# Patient Record
Sex: MALE | Race: WHITE | NOT HISPANIC OR LATINO | Employment: FULL TIME | URBAN - METROPOLITAN AREA
[De-identification: names, ages, dates, MRNs, and addresses within clinical notes are randomized per-mention and may not be internally consistent; named-entity substitution may affect disease eponyms.]

---

## 2017-02-24 DIAGNOSIS — E78.5 HYPERLIPIDEMIA LDL GOAL <100: ICD-10-CM

## 2017-02-24 DIAGNOSIS — I63.9 CVA (CEREBRAL VASCULAR ACCIDENT) (H): Primary | ICD-10-CM

## 2017-02-24 RX ORDER — ATORVASTATIN CALCIUM 20 MG/1
20 TABLET, FILM COATED ORAL DAILY
Qty: 90 TABLET | Refills: 0 | Status: SHIPPED | OUTPATIENT
Start: 2017-02-24 | End: 2017-03-16

## 2017-02-24 RX ORDER — CLOPIDOGREL BISULFATE 75 MG/1
75 TABLET ORAL DAILY
Qty: 90 TABLET | Refills: 0 | Status: SHIPPED | OUTPATIENT
Start: 2017-02-24 | End: 2017-03-16

## 2017-02-24 NOTE — TELEPHONE ENCOUNTER
Lipitor 20 mg     Last Written Prescription Date: 2/1/16  Last Fill Quantity: 90, # refills: 4  Last Office Visit with [facility] or  Health prescribing provider: 10/25/16       Lab Results   Component Value Date    CHOL 148 02/01/2016     Lab Results   Component Value Date    HDL 56 02/01/2016     Lab Results   Component Value Date    LDL 65 02/01/2016     Lab Results   Component Value Date    TRIG 137 02/01/2016     Lab Results   Component Value Date    CHOLHDLRATIO 3.1 01/22/2015     Plavix 75 mg tab           Last Written Prescription Date: 2/1/16  Last Fill Quantity: 90, # refills: 4    Last Office Visit with [facility] or  Health prescribing provider:  10/25/16   Future Office Visit:       Lab Results   Component Value Date    WBC 5.9 02/01/2016     Lab Results   Component Value Date    RBC 4.58 02/01/2016     Lab Results   Component Value Date    HGB 14.1 02/01/2016     Lab Results   Component Value Date    HCT 40.9 02/01/2016     No components found for: MCT  Lab Results   Component Value Date    MCV 89 02/01/2016     Lab Results   Component Value Date    MCH 30.8 02/01/2016     Lab Results   Component Value Date    MCHC 34.5 02/01/2016     Lab Results   Component Value Date    RDW 12.5 02/01/2016     Lab Results   Component Value Date     02/01/2016     Lab Results   Component Value Date    AST 24 02/01/2016     Lab Results   Component Value Date    ALT 46 02/01/2016     Creatinine   Date Value Ref Range Status   02/01/2016 0.80 0.66 - 1.25 mg/dL Final   ]

## 2017-02-24 NOTE — LETTER
[facility]  [address] [place] [address]  Phone: [phone]  Fax: [phone]        February 24, 2017      Lyle MARRUFO Jona                                                                                                                                [address] [place] [id number]            Dear Mr. Tenorio,    We are concerned about your health care.  We recently provided you with a medication refill.  Many medications require routine follow-up with your Doctor.      At this time we ask that: You schedule an appointment for your annual physical and fasting lab work.    Your prescription: Has been refilled for 1 month so you may have time for the above noted follow-up.      Thank you,      Narciso Oglesby PA-C/ ss

## 2017-03-16 ENCOUNTER — OFFICE VISIT (OUTPATIENT)
Dept: FAMILY MEDICINE | Facility: CLINIC | Age: 52
End: 2017-03-16
Payer: COMMERCIAL

## 2017-03-16 VITALS
TEMPERATURE: 96.7 F | HEIGHT: 71 IN | BODY MASS INDEX: 39.4 KG/M2 | DIASTOLIC BLOOD PRESSURE: 70 MMHG | WEIGHT: 281.4 LBS | SYSTOLIC BLOOD PRESSURE: 128 MMHG | HEART RATE: 68 BPM

## 2017-03-16 DIAGNOSIS — E78.5 HYPERLIPIDEMIA LDL GOAL <100: ICD-10-CM

## 2017-03-16 DIAGNOSIS — G47.33 OSA (OBSTRUCTIVE SLEEP APNEA): ICD-10-CM

## 2017-03-16 DIAGNOSIS — K21.9 GASTROESOPHAGEAL REFLUX DISEASE WITHOUT ESOPHAGITIS: ICD-10-CM

## 2017-03-16 DIAGNOSIS — I63.10 CEREBROVASCULAR ACCIDENT (CVA) DUE TO EMBOLISM OF PRECEREBRAL ARTERY (H): ICD-10-CM

## 2017-03-16 DIAGNOSIS — I63.429 CEREBRAL INFARCTION DUE TO EMBOLISM OF ANTERIOR CEREBRAL ARTERY, UNSPECIFIED BLOOD VESSEL LATERALITY (H): ICD-10-CM

## 2017-03-16 DIAGNOSIS — I10 HYPERTENSION GOAL BP (BLOOD PRESSURE) < 130/80: ICD-10-CM

## 2017-03-16 DIAGNOSIS — I63.10 CEREBRAL INFARCTION DUE TO EMBOLISM OF PRECEREBRAL ARTERY (H): Primary | ICD-10-CM

## 2017-03-16 LAB
ALBUMIN SERPL-MCNC: 4 G/DL (ref 3.4–5)
ALP SERPL-CCNC: 65 U/L (ref 40–150)
ALT SERPL W P-5'-P-CCNC: 41 U/L (ref 0–70)
ANION GAP SERPL CALCULATED.3IONS-SCNC: 9 MMOL/L (ref 3–14)
AST SERPL W P-5'-P-CCNC: 22 U/L (ref 0–45)
BASOPHILS # BLD AUTO: 0 10E9/L (ref 0–0.2)
BASOPHILS NFR BLD AUTO: 0.7 %
BILIRUB SERPL-MCNC: 0.6 MG/DL (ref 0.2–1.3)
BUN SERPL-MCNC: 16 MG/DL (ref 7–30)
CALCIUM SERPL-MCNC: 9 MG/DL (ref 8.5–10.1)
CHLORIDE SERPL-SCNC: 103 MMOL/L (ref 94–109)
CHOLEST SERPL-MCNC: 163 MG/DL
CO2 SERPL-SCNC: 26 MMOL/L (ref 20–32)
CREAT SERPL-MCNC: 0.72 MG/DL (ref 0.66–1.25)
DIFFERENTIAL METHOD BLD: ABNORMAL
EOSINOPHIL # BLD AUTO: 0.3 10E9/L (ref 0–0.7)
EOSINOPHIL NFR BLD AUTO: 5.7 %
ERYTHROCYTE [DISTWIDTH] IN BLOOD BY AUTOMATED COUNT: 12.8 % (ref 10–15)
GFR SERPL CREATININE-BSD FRML MDRD: ABNORMAL ML/MIN/1.7M2
GLUCOSE SERPL-MCNC: 120 MG/DL (ref 70–99)
HBA1C MFR BLD: 6 % (ref 4.3–6)
HCT VFR BLD AUTO: 39.5 % (ref 40–53)
HDLC SERPL-MCNC: 51 MG/DL
HGB BLD-MCNC: 13.4 G/DL (ref 13.3–17.7)
LDLC SERPL CALC-MCNC: 70 MG/DL
LYMPHOCYTES # BLD AUTO: 1.5 10E9/L (ref 0.8–5.3)
LYMPHOCYTES NFR BLD AUTO: 26 %
MCH RBC QN AUTO: 29.8 PG (ref 26.5–33)
MCHC RBC AUTO-ENTMCNC: 33.9 G/DL (ref 31.5–36.5)
MCV RBC AUTO: 88 FL (ref 78–100)
MONOCYTES # BLD AUTO: 0.6 10E9/L (ref 0–1.3)
MONOCYTES NFR BLD AUTO: 10.5 %
NEUTROPHILS # BLD AUTO: 3.2 10E9/L (ref 1.6–8.3)
NEUTROPHILS NFR BLD AUTO: 57.1 %
NONHDLC SERPL-MCNC: 112 MG/DL
PLATELET # BLD AUTO: 192 10E9/L (ref 150–450)
POTASSIUM SERPL-SCNC: 4 MMOL/L (ref 3.4–5.3)
PROT SERPL-MCNC: 7.3 G/DL (ref 6.8–8.8)
RBC # BLD AUTO: 4.49 10E12/L (ref 4.4–5.9)
SODIUM SERPL-SCNC: 138 MMOL/L (ref 133–144)
TRIGL SERPL-MCNC: 210 MG/DL
WBC # BLD AUTO: 5.6 10E9/L (ref 4–11)

## 2017-03-16 PROCEDURE — 80061 LIPID PANEL: CPT | Performed by: PHYSICIAN ASSISTANT

## 2017-03-16 PROCEDURE — 83036 HEMOGLOBIN GLYCOSYLATED A1C: CPT | Performed by: PHYSICIAN ASSISTANT

## 2017-03-16 PROCEDURE — 80053 COMPREHEN METABOLIC PANEL: CPT | Performed by: PHYSICIAN ASSISTANT

## 2017-03-16 PROCEDURE — 99396 PREV VISIT EST AGE 40-64: CPT | Performed by: PHYSICIAN ASSISTANT

## 2017-03-16 PROCEDURE — 36415 COLL VENOUS BLD VENIPUNCTURE: CPT | Performed by: PHYSICIAN ASSISTANT

## 2017-03-16 PROCEDURE — 85025 COMPLETE CBC W/AUTO DIFF WBC: CPT | Performed by: PHYSICIAN ASSISTANT

## 2017-03-16 RX ORDER — CLOPIDOGREL BISULFATE 75 MG/1
75 TABLET ORAL DAILY
Qty: 90 TABLET | Refills: 3 | Status: SHIPPED | OUTPATIENT
Start: 2017-03-16 | End: 2018-03-19

## 2017-03-16 RX ORDER — ATORVASTATIN CALCIUM 20 MG/1
20 TABLET, FILM COATED ORAL DAILY
Qty: 90 TABLET | Refills: 3 | Status: SHIPPED | OUTPATIENT
Start: 2017-03-16 | End: 2018-03-19

## 2017-03-16 RX ORDER — LISINOPRIL 2.5 MG/1
2.5 TABLET ORAL DAILY
Qty: 90 TABLET | Refills: 3 | Status: SHIPPED | OUTPATIENT
Start: 2017-03-16 | End: 2018-03-26

## 2017-03-16 NOTE — LETTER
[facility]  [address] [place] [address]  Phone: [phone]  Fax: [phone]    March 20, 2017    Lyle Tenorio  [address]  [place] [address]              Dear Mr. Tenorio,    The results of your recent lab tests have been reviewed. Labs look good. Glucose was slightly elevated again and this appears to be pre diabetes.  Enclosed is a copy of these results.  If you have any further questions or problems, please contact our office.        Sincerely,      Narciso Oglesby PA-C/ ac      Component      Latest Ref Rng & Units 3/16/2017   Hemoglobin A1C      4.3 - 6.0 % 6.0     Lipid Panel       Latest Ref Rng & Units 3/16/2017   Cholesterol      <200 mg/dL 163   Triglycerides      <150 mg/dL 210 (H)   HDL Cholesterol      >39 mg/dL 51   LDL Cholesterol Calculated      <100 mg/dL 70   Non HDL Cholesterol      <130 mg/dL 112                    tj        Comprehensive Metabolic Panel       Latest Ref Rng & Units 3/16/2017   Sodium      133 - 144 mmol/L 138   Potassium      3.4 - 5.3 mmol/L 4.0   Chloride      94 - 109 mmol/L 103   Carbon Dioxide      20 - 32 mmol/L 26   Anion Gap      3 - 14 mmol/L 9   Glucose      70 - 99 mg/dL 120 (H)   Urea Nitrogen      7 - 30 mg/dL 16   Creatinine      0.66 - 1.25 mg/dL 0.72   GFR Estimate      >60 mL/min/1.7m2 >90 . . .   GFR Estimate If Black      >60 mL/min/1.7m2 >90 . . .   Calcium      8.5 - 10.1 mg/dL 9.0   Bilirubin Total      0.2 - 1.3 mg/dL 0.6   Albumin      3.4 - 5.0 g/dL 4.0   Protein Total      6.8 - 8.8 g/dL 7.3   Alkaline Phosphatase      40 - 150 U/L 65   ALT      0 - 70 U/L 41   AST      0 - 45 U/L 22

## 2017-03-16 NOTE — PROGRESS NOTES
SUBJECTIVE:     CC: Lyle Tenorio is an 51 year old male who presents for preventative health visit.     Healthy Habits:    Do you get at least three servings of calcium containing foods daily (dairy, green leafy vegetables, etc.)? no, taking calcium and/or vitamin D supplement: yes - takes both     Amount of exercise or daily activities, outside of work: 0 day(s) per week    Problems taking medications regularly No    Medication side effects: No    Have you had an eye exam in the past two years? yes    Do you see a dentist twice per year? yes    Do you have sleep apnea, excessive snoring or daytime drowsiness?yes            Today's PHQ-2 Score:   PHQ-2 ( 1999 [employer]) 2/1/2016 1/4/2013   Q1: Little interest or pleasure in doing things 0 0   Q2: Feeling down, depressed or hopeless 0 0   PHQ-2 Score 0 0       Abuse: Current or Past(Physical, Sexual or Emotional)- No  Do you feel safe in your environment - Yes    Social History   Substance Use Topics     Smoking status: Former Smoker     Packs/day: 1.00     Years: 25.00     Types: Cigarettes     Quit date: 3/28/2010     Smokeless tobacco: Never Used     Alcohol use No     The patient does not drink >3 drinks per day nor >7 drinks per week.    Last PSA: No results found for: PSA    Recent Labs   Lab Test  02/01/16   1011  01/22/15   1134  10/29/13   1135   CHOL  148  170  174   HDL  56  55  64   LDL  65  65  93   TRIG  137  251*  85   CHOLHDLRATIO   --   3.1  3.0   [facility]  92   --    --        Reviewed orders with patient. Reviewed health maintenance and updated orders accordingly - Yes    Reviewed and updated as needed this visit by clinical staff  Tobacco  Allergies  Med Hx  Surg Hx  Fam Hx  Soc Hx        Reviewed and updated as needed this visit by Provider            ROS:  C: NEGATIVE for fever, chills, change in weight  I: NEGATIVE for worrisome rashes, moles or lesions  E: NEGATIVE for vision changes or irritation  ENT: NEGATIVE for ear, mouth and throat  "problems  R: NEGATIVE for significant cough or SOB  CV: NEGATIVE for chest pain, palpitations or peripheral edema  GI: NEGATIVE for nausea, abdominal pain, heartburn, or change in bowel habits   male: negative for dysuria, hematuria, decreased urinary stream, erectile dysfunction, urethral discharge  M: NEGATIVE for significant arthralgias or myalgia  N: NEGATIVE for weakness, dizziness or paresthesias  P: NEGATIVE for changes in mood or affect    Problem list, Medication list, Allergies, and Medical/Social/Surgical histories reviewed in [facility] and updated as appropriate.  OBJECTIVE:     /70 (BP Location: Right arm, Patient Position: Chair, Cuff Size: Adult Large)  Pulse 68  Temp 96.7  F (35.9  C) (Tympanic)  Ht 5' 11.25\" (1.81 m)  Wt 281 lb 6.4 oz (127.6 kg)  BMI 38.97 kg/m2  EXAM:  GENERAL: healthy, alert and no distress  EYES: Eyes grossly normal to inspection, PERRL and conjunctivae and sclerae normal  HENT: ear canals and TM's normal, nose and mouth without ulcers or lesions  NECK: no adenopathy, no asymmetry, masses, or scars and thyroid normal to palpation  RESP: lungs clear to auscultation - no rales, rhonchi or wheezes  CV: regular rate and rhythm, normal S1 S2, no S3 or S4, no murmur, click or rub, no peripheral edema and peripheral pulses strong  ABDOMEN: soft, nontender, no hepatosplenomegaly, no masses and bowel sounds normal  MS: no gross musculoskeletal defects noted, no edema  SKIN: no suspicious lesions or rashes  NEURO: Normal strength and tone, mentation intact and speech normal  PSYCH: mentation appears normal, affect normal/bright    ASSESSMENT/PLAN:         ICD-10-CM    1. Cerebral infarction due to embolism of precerebral artery (H) I63.10 Comprehensive metabolic panel (BMP + Alb, Alk Phos, ALT, AST, Total. Bili, TP)     Lipid Profile with reflex to direct LDL     CBC with platelets and differential   2. Hyperlipidemia LDL goal <100 E78.5 Comprehensive metabolic panel (BMP + Alb, Alk " "Phos, ALT, AST, Total. Bili, TP)     Lipid Profile with reflex to direct LDL     CBC with platelets and differential     atorvastatin (LIPITOR) 20 MG tablet   3. Hypertension goal BP (blood pressure) < 130/80 I10 Comprehensive metabolic panel (BMP + Alb, Alk Phos, ALT, AST, Total. Bili, TP)     Lipid Profile with reflex to direct LDL     CBC with platelets and differential   4. Cerebrovascular accident (CVA) due to embolism of precerebral artery (H) I63.10 Comprehensive metabolic panel (BMP + Alb, Alk Phos, ALT, AST, Total. Bili, TP)     Lipid Profile with reflex to direct LDL     CBC with platelets and differential     clopidogrel (PLAVIX) 75 MG tablet   5. Cerebral infarction due to embolism of anterior cerebral artery, unspecified blood vessel laterality (H) I63.429 lisinopril (PRINIVIL/ZESTRIL) 2.5 MG tablet   6. Gastroesophageal reflux disease without esophagitis K21.9 LANsoprazole (PREVACID SOLUTAB) 30 MG ODT tab   7. MADDY (obstructive sleep apnea) G47.33 order for DME       COUNSELING:  Reviewed preventive health counseling, as reflected in patient instructions       Regular exercise       Healthy diet/nutrition       Vision screening         reports that he quit smoking about 6 years ago. His smoking use included Cigarettes. He has a 25.00 pack-year smoking history. He has never used smokeless tobacco.    Estimated body mass index is 38.97 kg/(m^2) as calculated from the following:    Height as of this encounter: 5' 11.25\" (1.81 m).    Weight as of this encounter: 281 lb 6.4 oz (127.6 kg).   Weight management plan: Discussed healthy diet and exercise guidelines and patient will follow up in 12 months in clinic to re-evaluate.    Counseling Resources:  ATP IV Guidelines  Pooled Cohorts Equation Calculator  FRAX Risk Assessment  ICSI Preventive Guidelines  Dietary Guidelines for Americans, 2010  USDA's MyPlate  ASA Prophylaxis  Lung CA Screening    Abbie Epley, PA-S  LUCAS GuerreroC  [facility] " [facility]

## 2017-03-16 NOTE — MR AVS SNAPSHOT
After Visit Summary   3/16/2017    Lyle Tenorio    MRN: [id number]           Patient Information     Date Of Birth          1965        Visit Information        Provider Department      3/16/2017 8:00 AM Narciso Oglesby PA-C [facility]        Today's Diagnoses     Cerebral infarction due to embolism of precerebral artery (H)    -  1    Hyperlipidemia LDL goal <100        Hypertension goal BP (blood pressure) < 130/80        Cerebrovascular accident (CVA) due to embolism of precerebral artery (H)        Cerebral infarction due to embolism of anterior cerebral artery, unspecified blood vessel laterality (H)        Gastroesophageal reflux disease without esophagitis        MADDY (obstructive sleep apnea)          Care Instructions      Preventive Health Recommendations  Male Ages 40 to 49    Yearly exam:             See your health care provider every year in order to  o   Review health changes.   o   Discuss preventive care.    o   Review your medicines if your doctor has prescribed any.    You should be tested each year for STDs (sexually transmitted diseases) if you re at risk.     Have a cholesterol test every 5 years.     Have a colonoscopy (test for colon cancer) if someone in your family has had colon cancer or polyps before age 50.     After age 45, have a diabetes test (fasting glucose). If you are at risk for diabetes, you should have this test every 3 years.      Talk with your health care provider about whether or not a prostate cancer screening test (PSA) is right for you.    Shots: Get a flu shot each year. Get a tetanus shot every 10 years.     Nutrition:    Eat at least 5 servings of fruits and vegetables daily.     Eat whole-grain bread, whole-wheat pasta and brown rice instead of white grains and rice.     Talk to your provider about Calcium and Vitamin D.     Lifestyle    Exercise for at least 150 minutes a week (30 minutes a day, 5 days a week). This will help  "you control your weight and prevent disease.     Limit alcohol to one drink per day.     No smoking.     Wear sunscreen to prevent skin cancer.     See your dentist every six months for an exam and cleaning.            Follow-ups after your visit        Who to contact     If you have questions or need follow up information about today's clinic visit or your schedule please contact [facility] directly at [phone].  Normal or non-critical lab and imaging results will be communicated to you by [employer], letter or phone within 4 business days after the clinic has received the results. If you do not hear from us within 7 days, please contact the clinic through [employer] or phone. If you have a critical or abnormal lab result, we will notify you by phone as soon as possible.  Submit refill requests through [employer] or call your pharmacy and they will forward the refill request to us. Please allow 3 business days for your refill to be completed.          Additional Information About Your Visit        [employer] Information     [employer] gives you secure access to your electronic health record. If you see a primary care provider, you can also send messages to your care team and make appointments. If you have questions, please call your primary care clinic.  If you do not have a primary care provider, please call [phone] and they will assist you.        Care EveryWhere ID     This is your Care EveryWhere ID. This could be used by other organizations to access your [facility] medical records  [id number]        Your Vitals Were     Pulse Temperature Height BMI (Body Mass Index)          68 96.7  F (35.9  C) (Tympanic) 5' 11.25\" (1.81 m) 38.97 kg/m2         Blood Pressure from Last 3 Encounters:   03/16/17 128/70   10/25/16 129/85   02/01/16 128/84    Weight from Last 3 Encounters:   03/16/17 281 lb 6.4 oz (127.6 kg)   10/25/16 277 lb (125.6 kg)   02/01/16 274 lb 6.4 oz (124.5 kg)              We Performed " the Following     CBC with platelets and differential     Comprehensive metabolic panel (BMP + Alb, Alk Phos, ALT, AST, Total. Bili, TP)     Lipid Profile with reflex to direct LDL          Today's Medication Changes          These changes are accurate as of: 3/16/17  8:17 AM.  If you have any questions, ask your nurse or doctor.               These medicines have changed or have updated prescriptions.        Dose/Directions    atorvastatin 20 MG tablet   Commonly known as:  LIPITOR   This may have changed:  additional instructions   Used for:  Hyperlipidemia LDL goal <100   Changed by:  Narciso Oglesby PA-C        Dose:  20 mg   Take 1 tablet (20 mg) by mouth daily   Quantity:  90 tablet   Refills:  3       clopidogrel 75 MG tablet   Commonly known as:  PLAVIX   This may have changed:  additional instructions   Used for:  Cerebrovascular accident (CVA) due to embolism of precerebral artery (H)   Changed by:  Narciso Oglesby PA-C        Dose:  75 mg   Take 1 tablet (75 mg) by mouth daily   Quantity:  90 tablet   Refills:  3       vitamin D 2000 UNITS tablet   This may have changed:  how much to take   Used for:  Hypertension goal BP (blood pressure) < 130/80        Dose:  2000 Units   Take 2,000 Units by mouth daily.   Quantity:  100 tablet   Refills:  3            Where to get your medicines      These medications were sent to [facility] [address] [place] [address] - [address]   [address] [facility] [address]     Phone:  [phone]     atorvastatin 20 MG tablet    clopidogrel 75 MG tablet    LANsoprazole 30 MG ODT tab    lisinopril 2.5 MG tablet         Some of these will need a paper prescription and others can be bought over the counter.  Ask your nurse if you have questions.     Bring a paper prescription for each of these medications     order for DME                Primary Care Provider Office Phone # Fax #    Narciso Oglesby PA-C [phone]       [facility] [address]  [address] [address]        Thank you!     Thank you for choosing [facility]  for your care. Our goal is always to provide you with excellent care. Hearing back from our patients is one way we can continue to improve our services. Please take a few minutes to complete the written survey that you may receive in the mail after your visit with us. Thank you!             Your Updated Medication List - Protect others around you: Learn how to safely use, store and throw away your medicines at www.disposemymeds.org.          This list is accurate as of: 3/16/17  8:17 AM.  Always use your most recent med list.                   Brand Name Dispense Instructions for use    * ASPIRIN NOT PRESCRIBED    INTENTIONAL     Antiplatelet medication not prescribed intentionally due to Plavix       atorvastatin 20 MG tablet    LIPITOR    90 tablet    Take 1 tablet (20 mg) by mouth daily       calcium carbonate 500 MG chewable tablet    TUMS    180 chew tab    Take 1 tablet by mouth 2 times daily.       clopidogrel 75 MG tablet    PLAVIX    90 tablet    Take 1 tablet (75 mg) by mouth daily       cyanocobalamin 1000 MCG Tbcr    VITAMIN B-12 ER    100 tablet    Take 1,000 mcg by mouth daily.       IBUPROFEN PO      Take 400-600 mg by mouth as needed.       LANsoprazole 30 MG ODT tab    PREVACID SOLUTAB    30 tablet    Take 1 tablet (30 mg) by mouth as needed       lisinopril 2.5 MG tablet    PRINIVIL/Zestril    90 tablet    Take 1 tablet (2.5 mg) by mouth daily       * order for DME      Pt was set up on a [employer]ics RemStar 60 Series Auto AFlex 5-15 cm H2O with heated humidity and a modem. Pt chose a Mirage FX nasal mask standard size.       order for DME     1 Device     HEATED HUMIDITY  CHIN STRAP  NON-DSPOSABLE FILTERS  DISPOSABLE FILTERS (2 PER 1 MONTHS)  NASAL PILLOW (2 PER 1 MONTHS)  FULL FACE MASK  (1 PER 3 MONTHS)  FULL FACE CUSHION  (1 PER 1 MONTHS)   HUMIDIFIER CHAMBER (1 PER 6 MONTHS)  MASK (1 PER 3 MONTHS)  NASAL CUSHION (2 PER 1 MONTHS)  HEADGEAR (1 PER 6 MONTHS)  TUBING (1 PER 3 MONTHS)  HEATED TUBING (1 PER 3 MONTHS)       VITAMIN C PO      Take 1,000 mg by mouth       vitamin D 2000 UNITS tablet     100 tablet    Take 2,000 Units by mouth daily.       * Notice:  This list has 2 medication(s) that are the same as other medications prescribed for you. Read the directions carefully, and ask your doctor or other care provider to review them with you.

## 2017-03-16 NOTE — NURSING NOTE
"Chief Complaint   Patient presents with     Physical       Initial /70 (BP Location: Right arm, Patient Position: Chair, Cuff Size: Adult Large)  Pulse 68  Temp 96.7  F (35.9  C) (Tympanic)  Ht 5' 11.25\" (1.81 m)  Wt 281 lb 6.4 oz (127.6 kg)  BMI 38.97 kg/m2 Estimated body mass index is 38.97 kg/(m^2) as calculated from the following:    Height as of this encounter: 5' 11.25\" (1.81 m).    Weight as of this encounter: 281 lb 6.4 oz (127.6 kg).  Medication Reconciliation: complete    Health Maintenance that is potentially due pending provider review:  NONE    n/a    Brandee MANN CMA    "

## 2017-05-19 DIAGNOSIS — G47.33 OSA (OBSTRUCTIVE SLEEP APNEA): Primary | ICD-10-CM

## 2018-01-22 ENCOUNTER — OFFICE VISIT (OUTPATIENT)
Dept: SLEEP MEDICINE | Facility: CLINIC | Age: 53
End: 2018-01-22
Payer: COMMERCIAL

## 2018-01-22 VITALS
BODY MASS INDEX: 39.76 KG/M2 | DIASTOLIC BLOOD PRESSURE: 84 MMHG | SYSTOLIC BLOOD PRESSURE: 142 MMHG | WEIGHT: 284 LBS | OXYGEN SATURATION: 97 % | HEIGHT: 71 IN | HEART RATE: 78 BPM

## 2018-01-22 DIAGNOSIS — G47.33 OSA (OBSTRUCTIVE SLEEP APNEA): Primary | ICD-10-CM

## 2018-01-22 PROCEDURE — 99214 OFFICE O/P EST MOD 30 MIN: CPT | Performed by: FAMILY MEDICINE

## 2018-01-22 NOTE — PROGRESS NOTES
"  Obstructive Sleep Apnea - PAP Follow-Up Visit:    Chief Complaint   Patient presents with     CPAP Follow Up     Annual CPAP follow up       Lyle NIRU Tenorio comes in today for annual follow-up of moderate MADDY (12/12/2012 with AHI 22.7, anisha desat 86%) treated with CPAP 11 cm H2O. Presenting concerns of reducing risk factors for recurrent stroke.  He feels the CPAP continues to work well, sleeps well. Uses it every night. Discussed oral appliances for use on infrequent travel.  CPAP download from 12/23/2017 - 1/21/2018 on set pressure 11 cm H2O with average daily usage of 7 hours 49 minutes, % of days used >= 4 hours of 93.3%. AHI 1.3.     Problem List:  Patient Active Problem List    Diagnosis Date Noted     Generalized anxiety disorder 03/06/2013     Priority: Medium     Diagnosis updated by automated process. Provider to review and confirm.       Major depression in complete remission (H) 03/04/2013     Priority: Medium     MADDY (obstructive sleep apnea) 01/22/2013     Priority: Medium     Moderate MADDY (12/12/2012 with AHI 22.7, anisha desat 86%)       CVA (cerebral vascular accident) (H) 11/08/2012     Priority: Medium     Hypertension goal BP (blood pressure) < 130/80 06/08/2012     Priority: Medium     Cerebral embolism with cerebral infarction (H) 03/29/2012     Priority: Medium     Hyperlipidemia LDL goal <100 03/29/2012     Priority: Medium     Paresthesia 03/29/2012     Priority: Medium     Advanced care planning/counseling discussion 03/28/2012     Priority: Low     Worksheet given and will bring copy in when complete, will call if has questions                /84  Pulse 78  Ht 1.81 m (5' 11.25\")  Wt 128.8 kg (284 lb)  SpO2 97%  BMI 39.33 kg/m2    Impression/Plan:    1.) Moderate MADDY (12/12/2012 with AHI 22.7, anisha desat 86%) currently treated with CPAP 11 cm H2O  - Goals for treatment would be to reduce risk for recurrent stroke and to improve sleep quality / daytime fatigue   - Excellent " "response to CPAP therapy and patient feels it is \"awesome\" and MADDY appears well controlled per download  - Follow up in two years.       Lyle Tenorio will follow up in about 2 year(s).     Twenty-five minutes spent with patient, all of which were spent face-to-face counseling, consulting, coordinating plan of care.      Arnold Phillips MD, MD    CC:  Narciso Oglesby  "

## 2018-01-22 NOTE — MR AVS SNAPSHOT
After Visit Summary   1/22/2018    Lyle Tenorio    MRN: [id number]           Patient Information     Date Of Birth          1965        Visit Information        Provider Department      1/22/2018 9:30 AM Arnold Phillips MD [facility]        Today's Diagnoses     MADDY (obstructive sleep apnea)    -  1      Care Instructions      Your BMI is Body mass index is 39.33 kg/(m^2).  Weight management is a personal decision.  If you are interested in exploring weight loss strategies, the following discussion covers the approaches that may be successful. Body mass index (BMI) is one way to tell whether you are at a healthy weight, overweight, or obese. It measures your weight in relation to your height.  A BMI of 18.5 to 24.9 is in the healthy range. A person with a BMI of 25 to 29.9 is considered overweight, and someone with a BMI of 30 or greater is considered obese. More than two-thirds of American adults are considered overweight or obese.  Being overweight or obese increases the risk for further weight gain. Excess weight may lead to heart disease and diabetes.  Creating and following plans for healthy eating and physical activity may help you improve your health.  Weight control is part of healthy lifestyle and includes exercise, emotional health, and healthy eating habits. Careful eating habits lifelong are the mainstay of weight control. Though there are significant health benefits from weight loss, long-term weight loss with diet alone may be very difficult to achieve- studies show long-term success with dietary management in less than 10% of people. Attaining a healthy weight may be especially difficult to achieve in those with severe obesity. In some cases, medications, devices and surgical management might be considered.  What can you do?  If you are overweight or obese and are interested in methods for weight loss, you should discuss this with your provider.      Consider reducing daily calorie intake by 500 calories.     Keep a food journal.     Avoiding skipping meals, consider cutting portions instead.    Diet combined with exercise helps maintain muscle while optimizing fat loss. Strength training is particularly important for building and maintaining muscle mass. Exercise helps reduce stress, increase energy, and improves fitness. Increasing exercise without diet control, however, may not burn enough calories to loose weight.       Start walking three days a week 10-20 minutes at a time    Work towards walking thirty minutes five days a week     Eventually, increase the speed of your walking for 1-2 minutes at time    In addition, we recommend that you review healthy lifestyles and methods for weight loss available through the National Institutes of Health patient information sites:  http://win.niddk.nih.gov/publications/index.htm    And look into health and wellness programs that may be available through your health insurance provider, employer, local community center, or adriana club.    Weight management plan: Patient was referred to their PCP to discuss a diet and exercise plan.      Your Body mass index is 39.33 kg/(m^2).  Weight management is a personal decision.  If you are interested in exploring weight loss strategies, the following discussion covers the approaches that may be successful. Body mass index (BMI) is one way to tell whether you are at a healthy weight, overweight, or obese. It measures your weight in relation to your height.  A BMI of 18.5 to 24.9 is in the healthy range. A person with a BMI of 25 to 29.9 is considered overweight, and someone with a BMI of 30 or greater is considered obese. More than two-thirds of American adults are considered overweight or obese.  Being overweight or obese increases the risk for further weight gain. Excess weight may lead to heart disease and diabetes.  Creating and following plans for healthy eating and  physical activity may help you improve your health.  Weight control is part of healthy lifestyle and includes exercise, emotional health, and healthy eating habits. Careful eating habits lifelong are the mainstay of weight control. Though there are significant health benefits from weight loss, long-term weight loss with diet alone may be very difficult to achieve- studies show long-term success with dietary management in less than 10% of people. Attaining a healthy weight may be especially difficult to achieve in those with severe obesity. In some cases, medications, devices and surgical management might be considered.  What can you do?  If you are overweight or obese and are interested in methods for weight loss, you should discuss this with your provider.     Consider reducing daily calorie intake by 500 calories.     Keep a food journal.     Avoiding skipping meals, consider cutting portions instead.    Diet combined with exercise helps maintain muscle while optimizing fat loss. Strength training is particularly important for building and maintaining muscle mass. Exercise helps reduce stress, increase energy, and improves fitness. Increasing exercise without diet control, however, may not burn enough calories to loose weight.       Start walking three days a week 10-20 minutes at a time    Work towards walking thirty minutes five days a week     Eventually, increase the speed of your walking for 1-2 minutes at time    In addition, we recommend that you review healthy lifestyles and methods for weight loss available through the National Institutes of Health patient information sites:  http://win.niddk.nih.gov/publications/index.htm    And look into health and wellness programs that may be available through your health insurance provider, employer, local community center, or adriana club.    Weight management plan: Patient was referred to their PCP to discuss a diet and exercise plan.            Follow-ups after your  "visit        Follow-up notes from your care team     Return in 2 years (on 1/22/2020) for PAP follow up.      Who to contact     If you have questions or need follow up information about today's clinic visit or your schedule please contact [facility] directly at [phone].  Normal or non-critical lab and imaging results will be communicated to you by MyChart, letter or phone within 4 business days after the clinic has received the results. If you do not hear from us within 7 days, please contact the clinic through [employer] or phone. If you have a critical or abnormal lab result, we will notify you by phone as soon as possible.  Submit refill requests through [employer] or call your pharmacy and they will forward the refill request to us. Please allow 3 business days for your refill to be completed.          Additional Information About Your Visit        MyChart Information     [employer] gives you secure access to your electronic health record. If you see a primary care provider, you can also send messages to your care team and make appointments. If you have questions, please call your primary care clinic.  If you do not have a primary care provider, please call [phone] and they will assist you.        Care EveryWhere ID     This is your Care EveryWhere ID. This could be used by other organizations to access your [place] medical records  [id number]        Your Vitals Were     Pulse Height Pulse Oximetry BMI (Body Mass Index)          78 1.81 m (5' 11.25\") 97% 39.33 kg/m2         Blood Pressure from Last 3 Encounters:   01/22/18 142/84   03/16/17 128/70   10/25/16 129/85    Weight from Last 3 Encounters:   01/22/18 128.8 kg (284 lb)   03/16/17 127.6 kg (281 lb 6.4 oz)   10/25/16 125.6 kg (277 lb)              We Performed the Following     Sleep Comprehensive DME          Today's Medication Changes          These changes are accurate as of: 1/22/18 11:25 AM.  If you have any questions, ask your " nurse or doctor.               These medicines have changed or have updated prescriptions.        Dose/Directions    vitamin D 2000 UNITS tablet   This may have changed:  how much to take   Used for:  Hypertension goal BP (blood pressure) < 130/80        Dose:  2000 Units   Take 2,000 Units by mouth daily.   Quantity:  100 tablet   Refills:  3                Primary Care Provider Office Phone # Fax #    Narciso Oglesby PA-C [phone] [address] [place] [address]        Equal Access to Services     JAMARI HASTINGS : Hadii aad ku hadasho Soomaali, waaxda luqadaha, qaybta kaalmada adeegyada, waxay idiin hayaan adeeg kharakate lahaydee . So [facility] [phone].    ATENCIÓN: Si habla español, tiene a villalobos disposición servicios gratuitos de asistencia lingüística. [facility] [phone].    We comply with applicable federal civil rights laws and [place] laws. We do not discriminate on the basis of race, color, national origin, age, disability, sex, sexual orientation, or gender identity.            Thank you!     Thank you for choosing [facility]  for your care. Our goal is always to provide you with excellent care. Hearing back from our patients is one way we can continue to improve our services. Please take a few minutes to complete the written survey that you may receive in the mail after your visit with us. Thank you!             Your Updated Medication List - Protect others around you: Learn how to safely use, store and throw away your medicines at www.disposemymeds.org.          This list is accurate as of: 1/22/18 11:25 AM.  Always use your most recent med list.                   Brand Name Dispense Instructions for use Diagnosis    * ASPIRIN NOT PRESCRIBED    INTENTIONAL     Antiplatelet medication not prescribed intentionally due to Plavix    Cerebral embolism with cerebral infarction (H)       atorvastatin 20 MG tablet    LIPITOR    90 tablet    Take 1 tablet (20 mg) by  mouth daily    Hyperlipidemia LDL goal <100       calcium carbonate 500 MG chewable tablet    TUMS    180 chew tab    Take 1 tablet by mouth 2 times daily.    Lactose intolerance       clopidogrel 75 MG tablet    PLAVIX    90 tablet    Take 1 tablet (75 mg) by mouth daily    Cerebrovascular accident (CVA) due to embolism of precerebral artery (H)       cyanocobalamin 1000 MCG Tbcr    VITAMIN B-12 ER    100 tablet    Take 1,000 mcg by mouth daily.    Vitamin B12 deficiency disease       IBUPROFEN PO      Take 400-600 mg by mouth as needed.        LANsoprazole 30 MG ODT tab    PREVACID SOLUTAB    30 tablet    Take 1 tablet (30 mg) by mouth as needed    Gastroesophageal reflux disease without esophagitis       lisinopril 2.5 MG tablet    PRINIVIL/Zestril    90 tablet    Take 1 tablet (2.5 mg) by mouth daily    Cerebral infarction due to embolism of anterior cerebral artery, unspecified blood vessel laterality (H)       * order for DME      Pt was set up on a [employer] RemStar 60 Series Auto AFlex 5-15 cm H2O with heated humidity and a modem. Pt chose a Mirage FX nasal mask standard size.        order for DME     1 Device     HEATED HUMIDITY  CHIN STRAP  NON-DSPOSABLE FILTERS  DISPOSABLE FILTERS (2 PER 1 MONTHS)  NASAL PILLOW (2 PER 1 MONTHS)  FULL FACE MASK  (1 PER 3 MONTHS)  FULL FACE CUSHION  (1 PER 1 MONTHS)  HUMIDIFIER CHAMBER (1 PER 6 MONTHS)  MASK (1 PER 3 MONTHS)  NASAL CUSHION (2 PER 1 MONTHS)  HEADGEAR (1 PER 6 MONTHS)  TUBING (1 PER 3 MONTHS)  HEATED TUBING (1 PER 3 MONTHS)    MADDY (obstructive sleep apnea)       VITAMIN C PO      Take 1,000 mg by mouth        vitamin D 2000 UNITS tablet     100 tablet    Take 2,000 Units by mouth daily.    Hypertension goal BP (blood pressure) < 130/80       * Notice:  This list has 2 medication(s) that are the same as other medications prescribed for you. Read the directions carefully, and ask your doctor or  other care provider to review them with you.

## 2018-01-22 NOTE — NURSING NOTE

## 2018-01-22 NOTE — PATIENT INSTRUCTIONS

## 2018-03-19 DIAGNOSIS — E78.5 HYPERLIPIDEMIA LDL GOAL <100: ICD-10-CM

## 2018-03-19 DIAGNOSIS — I63.10 CEREBROVASCULAR ACCIDENT (CVA) DUE TO EMBOLISM OF PRECEREBRAL ARTERY (H): ICD-10-CM

## 2018-03-19 RX ORDER — CLOPIDOGREL BISULFATE 75 MG/1
TABLET ORAL
Qty: 30 TABLET | Refills: 0 | Status: SHIPPED | OUTPATIENT
Start: 2018-03-19 | End: 2018-03-26

## 2018-03-19 RX ORDER — ATORVASTATIN CALCIUM 20 MG/1
TABLET, FILM COATED ORAL
Qty: 30 TABLET | Refills: 0 | Status: SHIPPED | OUTPATIENT
Start: 2018-03-19 | End: 2018-03-26

## 2018-03-26 ENCOUNTER — OFFICE VISIT (OUTPATIENT)
Dept: FAMILY MEDICINE | Facility: CLINIC | Age: 53
End: 2018-03-26
Payer: COMMERCIAL

## 2018-03-26 VITALS
RESPIRATION RATE: 20 BRPM | SYSTOLIC BLOOD PRESSURE: 130 MMHG | WEIGHT: 285 LBS | DIASTOLIC BLOOD PRESSURE: 78 MMHG | TEMPERATURE: 97.8 F | BODY MASS INDEX: 39.47 KG/M2 | HEART RATE: 76 BPM

## 2018-03-26 DIAGNOSIS — K21.9 GASTROESOPHAGEAL REFLUX DISEASE WITHOUT ESOPHAGITIS: ICD-10-CM

## 2018-03-26 DIAGNOSIS — I63.10 CEREBRAL INFARCTION DUE TO EMBOLISM OF PRECEREBRAL ARTERY (H): ICD-10-CM

## 2018-03-26 DIAGNOSIS — Z12.5 SCREENING FOR PROSTATE CANCER: ICD-10-CM

## 2018-03-26 DIAGNOSIS — E78.5 HYPERLIPIDEMIA LDL GOAL <100: Primary | ICD-10-CM

## 2018-03-26 DIAGNOSIS — I63.429 CEREBRAL INFARCTION DUE TO EMBOLISM OF ANTERIOR CEREBRAL ARTERY, UNSPECIFIED BLOOD VESSEL LATERALITY (H): ICD-10-CM

## 2018-03-26 DIAGNOSIS — F32.5 MAJOR DEPRESSION IN COMPLETE REMISSION (H): ICD-10-CM

## 2018-03-26 DIAGNOSIS — I10 HYPERTENSION GOAL BP (BLOOD PRESSURE) < 130/80: ICD-10-CM

## 2018-03-26 DIAGNOSIS — I63.10 CEREBROVASCULAR ACCIDENT (CVA) DUE TO EMBOLISM OF PRECEREBRAL ARTERY (H): ICD-10-CM

## 2018-03-26 DIAGNOSIS — Z12.11 SPECIAL SCREENING FOR MALIGNANT NEOPLASMS, COLON: ICD-10-CM

## 2018-03-26 LAB
ALBUMIN SERPL-MCNC: 4.1 G/DL (ref 3.4–5)
ALP SERPL-CCNC: 62 U/L (ref 40–150)
ALT SERPL W P-5'-P-CCNC: 33 U/L (ref 0–70)
ANION GAP SERPL CALCULATED.3IONS-SCNC: 7 MMOL/L (ref 3–14)
AST SERPL W P-5'-P-CCNC: 19 U/L (ref 0–45)
BILIRUB SERPL-MCNC: 0.6 MG/DL (ref 0.2–1.3)
BUN SERPL-MCNC: 19 MG/DL (ref 7–30)
CALCIUM SERPL-MCNC: 8.6 MG/DL (ref 8.5–10.1)
CHLORIDE SERPL-SCNC: 106 MMOL/L (ref 94–109)
CHOLEST SERPL-MCNC: 154 MG/DL
CO2 SERPL-SCNC: 27 MMOL/L (ref 20–32)
CREAT SERPL-MCNC: 0.79 MG/DL (ref 0.66–1.25)
GFR SERPL CREATININE-BSD FRML MDRD: >90 ML/MIN/1.7M2
GLUCOSE SERPL-MCNC: 142 MG/DL (ref 70–99)
HBA1C MFR BLD: 6.1 % (ref 4.3–6)
HDLC SERPL-MCNC: 53 MG/DL
LDLC SERPL CALC-MCNC: 52 MG/DL
NONHDLC SERPL-MCNC: 101 MG/DL
POTASSIUM SERPL-SCNC: 4 MMOL/L (ref 3.4–5.3)
PROT SERPL-MCNC: 7.5 G/DL (ref 6.8–8.8)
PSA SERPL-ACNC: 0.14 UG/L (ref 0–4)
SODIUM SERPL-SCNC: 140 MMOL/L (ref 133–144)
TRIGL SERPL-MCNC: 246 MG/DL

## 2018-03-26 PROCEDURE — 99214 OFFICE O/P EST MOD 30 MIN: CPT | Performed by: PHYSICIAN ASSISTANT

## 2018-03-26 PROCEDURE — 80053 COMPREHEN METABOLIC PANEL: CPT | Performed by: PHYSICIAN ASSISTANT

## 2018-03-26 PROCEDURE — 83036 HEMOGLOBIN GLYCOSYLATED A1C: CPT | Performed by: PHYSICIAN ASSISTANT

## 2018-03-26 PROCEDURE — G0103 PSA SCREENING: HCPCS | Performed by: PHYSICIAN ASSISTANT

## 2018-03-26 PROCEDURE — 80061 LIPID PANEL: CPT | Performed by: PHYSICIAN ASSISTANT

## 2018-03-26 PROCEDURE — 36415 COLL VENOUS BLD VENIPUNCTURE: CPT | Performed by: PHYSICIAN ASSISTANT

## 2018-03-26 RX ORDER — LISINOPRIL 2.5 MG/1
2.5 TABLET ORAL DAILY
Qty: 90 TABLET | Refills: 3 | Status: SHIPPED | OUTPATIENT
Start: 2018-03-26 | End: 2019-06-07

## 2018-03-26 RX ORDER — ATORVASTATIN CALCIUM 20 MG/1
20 TABLET, FILM COATED ORAL DAILY
Qty: 90 TABLET | Refills: 3 | Status: SHIPPED | OUTPATIENT
Start: 2018-03-26 | End: 2019-05-07

## 2018-03-26 RX ORDER — CLOPIDOGREL BISULFATE 75 MG/1
75 TABLET ORAL DAILY
Qty: 90 TABLET | Refills: 3 | Status: SHIPPED | OUTPATIENT
Start: 2018-03-26 | End: 2019-04-12

## 2018-03-26 ASSESSMENT — ENCOUNTER SYMPTOMS
DEPRESSION: 0
DIZZINESS: 0
HEADACHES: 0
COUGH: 0
FREQUENCY: 0
SORE THROAT: 0
EYE PAIN: 0
NAUSEA: 0
MYALGIAS: 0
DIARRHEA: 0
TINGLING: 0
ORTHOPNEA: 0
SEIZURES: 0
CONSTIPATION: 0
ABDOMINAL PAIN: 0
PHOTOPHOBIA: 0
VOMITING: 0
NECK PAIN: 0
FEVER: 0
WEAKNESS: 0
BACK PAIN: 0
HEMOPTYSIS: 0
DOUBLE VISION: 0
PALPITATIONS: 0
EYE REDNESS: 0
WHEEZING: 0
DIAPHORESIS: 0
HEARTBURN: 0
WEIGHT LOSS: 0
SENSORY CHANGE: 0
DYSURIA: 0
SHORTNESS OF BREATH: 0
SPUTUM PRODUCTION: 0
INSOMNIA: 0
LOSS OF CONSCIOUSNESS: 0
BLURRED VISION: 0
NEUROLOGICAL NEGATIVE: 1
NERVOUS/ANXIOUS: 0
BLOOD IN STOOL: 0
FOCAL WEAKNESS: 0
HALLUCINATIONS: 0
EYE DISCHARGE: 0

## 2018-03-26 ASSESSMENT — LIFESTYLE VARIABLES: SUBSTANCE_ABUSE: 0

## 2018-03-26 ASSESSMENT — PAIN SCALES - GENERAL: PAINLEVEL: NO PAIN (0)

## 2018-03-26 NOTE — NURSING NOTE
"Chief Complaint   Patient presents with     Lipids     Hypertension       Initial /78 (BP Location: Right arm, Patient Position: Sitting, Cuff Size: Adult Large)  Pulse 76  Temp 97.8  F (36.6  C) (Tympanic)  Resp 20  Wt 285 lb (129.3 kg)  BMI 39.47 kg/m2 Estimated body mass index is 39.47 kg/(m^2) as calculated from the following:    Height as of 1/22/18: 5' 11.25\" (1.81 m).    Weight as of this encounter: 285 lb (129.3 kg).      Health Maintenance that is potentially due pending provider review:  NONE    n/a    Is there anyone who you would like to be able to receive your results? No  If yes have patient fill out MIRNA      "

## 2018-03-26 NOTE — MR AVS SNAPSHOT
After Visit Summary   3/26/2018    Lyle Tenorio    MRN: [id number]           Patient Information     Date Of Birth          1965        Visit Information        Provider Department      3/26/2018 8:00 AM Narciso Oglesby PA-C [facility]        Today's Diagnoses     Hyperlipidemia LDL goal <100    -  1    Cerebrovascular accident (CVA) due to embolism of precerebral artery (H)        Special screening for malignant neoplasms, colon        Screening for prostate cancer        Hypertension goal BP (blood pressure) < 130/80        Cerebral infarction due to embolism of precerebral artery (H)        Major depression in complete remission (H)        Cerebral infarction due to embolism of anterior cerebral artery, unspecified blood vessel laterality (H)        Gastroesophageal reflux disease without esophagitis           Follow-ups after your visit        Additional Services     GASTROENTEROLOGY ADULT REF PROCEDURE ONLY       Last Lab Result: Creatinine (mg/dL)       Date                     Value                 03/16/2017               0.72             ----------  There is no height or weight on file to calculate BMI.     Needed:  No  Language:  English    Patient will be contacted to schedule procedure.     Please be aware that coverage of these services is subject to the terms and limitations of your health insurance plan.  Call member services at your health plan with any benefit or coverage questions.  Any procedures must be performed at a [place] facility OR coordinated by your clinic's referral office.    Please bring the following with you to your appointment:    (1) Any X-Rays, CTs or MRIs which have been performed.  Contact the facility where they were done to arrange for  prior to your scheduled appointment.    (2) List of current medications   (3) This referral request   (4) Any documents/labs given to you for this referral                  Follow-up  notes from your care team     Return in about 1 year (around 3/26/2019).      Who to contact     If you have questions or need follow up information about today's clinic visit or your schedule please contact [facility] directly at [phone].  Normal or non-critical lab and imaging results will be communicated to you by MyChart, letter or phone within 4 business days after the clinic has received the results. If you do not hear from us within 7 days, please contact the clinic through [employer] or phone. If you have a critical or abnormal lab result, we will notify you by phone as soon as possible.  Submit refill requests through [employer] or call your pharmacy and they will forward the refill request to us. Please allow 3 business days for your refill to be completed.          Additional Information About Your Visit        MyChart Information     [employer] gives you secure access to your electronic health record. If you see a primary care provider, you can also send messages to your care team and make appointments. If you have questions, please call your primary care clinic.  If you do not have a primary care provider, please call [phone] and they will assist you.        Care EveryWhere ID     This is your Care EveryWhere ID. This could be used by other organizations to access your [place] medical records  [id number]        Your Vitals Were     Pulse Temperature Respirations BMI (Body Mass Index)          76 97.8  F (36.6  C) (Tympanic) 20 39.47 kg/m2         Blood Pressure from Last 3 Encounters:   03/26/18 130/78   01/22/18 142/84   03/16/17 128/70    Weight from Last 3 Encounters:   03/26/18 285 lb (129.3 kg)   01/22/18 284 lb (128.8 kg)   03/16/17 281 lb 6.4 oz (127.6 kg)              We Performed the Following     Comprehensive metabolic panel (BMP + Alb, Alk Phos, ALT, AST, Total. Bili, TP)     GASTROENTEROLOGY ADULT REF PROCEDURE ONLY     Hemoglobin A1c     Lipid Profile (Chol, Trig,  HDL, LDL calc)     PSA, screen          Today's Medication Changes          These changes are accurate as of 3/26/18  9:14 AM.  If you have any questions, ask your nurse or doctor.               These medicines have changed or have updated prescriptions.        Dose/Directions    atorvastatin 20 MG tablet   Commonly known as:  LIPITOR   This may have changed:  See the new instructions.   Used for:  Hyperlipidemia LDL goal <100   Changed by:  Narciso Oglesby PA-C        Dose:  20 mg   Take 1 tablet (20 mg) by mouth daily   Quantity:  90 tablet   Refills:  3       clopidogrel 75 MG tablet   Commonly known as:  PLAVIX   This may have changed:  See the new instructions.   Used for:  Cerebrovascular accident (CVA) due to embolism of precerebral artery (H)   Changed by:  Narciso Oglesby PA-C        Dose:  75 mg   Take 1 tablet (75 mg) by mouth daily   Quantity:  90 tablet   Refills:  3       vitamin D 2000 UNITS tablet   This may have changed:  how much to take   Used for:  Hypertension goal BP (blood pressure) < 130/80        Dose:  2000 Units   Take 2,000 Units by mouth daily.   Quantity:  100 tablet   Refills:  3            Where to get your medicines      These medications were sent to [facility] [address]   [address] [facility] [address]     Phone:  [phone]     atorvastatin 20 MG tablet    clopidogrel 75 MG tablet    LANsoprazole 30 MG ODT tab    lisinopril 2.5 MG tablet                Primary Care Provider Office Phone # Fax #    Narciso Oglesby PA-C [phone] [address] [address]        Equal Access to Services     [facility]: Hadii racahel trano Sosudhirali, waaxda luqadaha, qaybta kaalmada adeegyada, kailee recinos. So [facility] [phone].    ATENCIÓN: Si habla español, tiene a villalobos disposición servicios gratuitos de asistencia lingüística. Llame al [phone].    We comply with applicable federal  civil rights laws and [place] laws. We do not discriminate on the basis of race, color, national origin, age, disability, sex, sexual orientation, or gender identity.            Thank you!     Thank you for choosing [facility]  for your care. Our goal is always to provide you with excellent care. Hearing back from our patients is one way we can continue to improve our services. Please take a few minutes to complete the written survey that you may receive in the mail after your visit with us. Thank you!             Your Updated Medication List - Protect others around you: Learn how to safely use, store and throw away your medicines at www.disposemymeds.org.          This list is accurate as of 3/26/18  9:14 AM.  Always use your most recent med list.                   Brand Name Dispense Instructions for use Diagnosis    * ASPIRIN NOT PRESCRIBED    INTENTIONAL     Antiplatelet medication not prescribed intentionally due to Plavix    Cerebral embolism with cerebral infarction (H)       atorvastatin 20 MG tablet    LIPITOR    90 tablet    Take 1 tablet (20 mg) by mouth daily    Hyperlipidemia LDL goal <100       calcium carbonate 500 MG chewable tablet    TUMS    180 chew tab    Take 1 tablet by mouth 2 times daily.    Lactose intolerance       clopidogrel 75 MG tablet    PLAVIX    90 tablet    Take 1 tablet (75 mg) by mouth daily    Cerebrovascular accident (CVA) due to embolism of precerebral artery (H)       cyanocobalamin 1000 MCG Tbcr    VITAMIN B-12 ER    100 tablet    Take 1,000 mcg by mouth daily.    Vitamin B12 deficiency disease       IBUPROFEN PO      Take 400-600 mg by mouth as needed.        LANsoprazole 30 MG ODT tab    PREVACID SOLUTAB    90 tablet    Take 1 tablet (30 mg) by mouth as needed    Gastroesophageal reflux disease without esophagitis       lisinopril 2.5 MG tablet    PRINIVIL/Zestril    90 tablet    Take 1 tablet (2.5 mg) by mouth daily    Cerebral infarction due to  embolism of anterior cerebral artery, unspecified blood vessel laterality (H)       * order for DME      Pt was set up on a [employer] RemStar 60 Series Auto AFlex 5-15 cm H2O with heated humidity and a modem. Pt chose a Mirage FX nasal mask standard size.        order for DME     1 Device     HEATED HUMIDITY  CHIN STRAP  NON-DSPOSABLE FILTERS  DISPOSABLE FILTERS (2 PER 1 MONTHS)  NASAL PILLOW (2 PER 1 MONTHS)  FULL FACE MASK  (1 PER 3 MONTHS)  FULL FACE CUSHION  (1 PER 1 MONTHS)  HUMIDIFIER CHAMBER (1 PER 6 MONTHS)  MASK (1 PER 3 MONTHS)  NASAL CUSHION (2 PER 1 MONTHS)  HEADGEAR (1 PER 6 MONTHS)  TUBING (1 PER 3 MONTHS)  HEATED TUBING (1 PER 3 MONTHS)    MADDY (obstructive sleep apnea)       VITAMIN C PO      Take 1,000 mg by mouth        vitamin D 2000 UNITS tablet     100 tablet    Take 2,000 Units by mouth daily.    Hypertension goal BP (blood pressure) < 130/80       * Notice:  This list has 2 medication(s) that are the same as other medications prescribed for you. Read the directions carefully, and ask your doctor or other care provider to review them with you.

## 2018-03-26 NOTE — PROGRESS NOTES
HPI    SUBJECTIVE:   Lyle Tenorio is a 52 year old male who presents to clinic today for follow-up of his  hyperlipidemia and hypertension.  He has had hemoglobin A1c's to the been borderline in the 6.0 and 6.1 range and there is a strong family history of diabetes so he would like to continue checking this to be sure that he is not developing diabetes.  He has had a previous CVA and is on long-term anticoagulation using Plavix.  We also discussed adult health maintenance recommendations now that he has turned 52 and he would like to proceed with colonoscopy as well as prostate cancer screening.  He is having no problems with his current medications and needs refills of his medications.  He is having no significant physical symptoms at this time    Hyperlipidemia Follow-Up      Rate your low fat/cholesterol diet?: poor    Taking statin?  Yes, no muscle aches from statin    Other lipid medications/supplements?:  none    Hypertension Follow-up      Outpatient blood pressures are not being checked.    Low Salt Diet: low salt      Amount of exercise or physical activity: None    Problems taking medications regularly: No    Medication side effects: none    Diet: regular (no restrictions)    Problem list and histories reviewed & adjusted, as indicated.  Additional history: as documented    Patient Active Problem List   Diagnosis     Advanced care planning/counseling discussion     Cerebral embolism with cerebral infarction (H)     Hyperlipidemia LDL goal <100     Paresthesia     Hypertension goal BP (blood pressure) < 130/80     CVA (cerebral vascular accident) (H)     MADDY (obstructive sleep apnea)     Major depression in complete remission (H)     Generalized anxiety disorder     Past Surgical History:   Procedure Laterality Date     ENT SURGERY      NOSE BLLED IN PAST NO SURGURY       Social History   Substance Use Topics     Smoking status: Former Smoker     Packs/day: 1.00     Years: 25.00     Types: Cigarettes      Quit date: 3/28/2010     Smokeless tobacco: Never Used     Alcohol use No     Family History   Problem Relation Age of Onset     HEART DISEASE Mother      MI late 50's age     DIABETES Father      Blood Disease Father      clots     Breast Cancer Maternal Grandmother      HEART DISEASE Maternal Grandfather      Alcohol/Drug Paternal Grandfather      Blood Disease Brother      clots-antiptrip     DIABETES Brother 52     Type II         Current Outpatient Prescriptions   Medication Sig Dispense Refill     clopidogrel (PLAVIX) 75 MG tablet Take 1 tablet (75 mg) by mouth daily 90 tablet 3     atorvastatin (LIPITOR) 20 MG tablet Take 1 tablet (20 mg) by mouth daily 90 tablet 3     lisinopril (PRINIVIL/ZESTRIL) 2.5 MG tablet Take 1 tablet (2.5 mg) by mouth daily 90 tablet 3     LANsoprazole (PREVACID SOLUTAB) 30 MG ODT tab Take 1 tablet (30 mg) by mouth as needed 90 tablet 3     order for DME  HEATED HUMIDITY   CHIN STRAP   NON-DSPOSABLE FILTERS   DISPOSABLE FILTERS (2 PER 1 MONTHS)   NASAL PILLOW (2 PER 1 MONTHS)   FULL FACE MASK  (1 PER 3 MONTHS)   FULL FACE CUSHION  (1 PER 1 MONTHS)   HUMIDIFIER CHAMBER (1 PER 6 MONTHS)   MASK (1 PER 3 MONTHS)   NASAL CUSHION (2 PER 1 MONTHS)   HEADGEAR (1 PER 6 MONTHS)   TUBING (1 PER 3 MONTHS)   HEATED TUBING (1 PER 3 MONTHS) 1 Device 1     Ascorbic Acid (VITAMIN C PO) Take 1,000 mg by mouth        Cholecalciferol (VITAMIN D) 2000 UNITS tablet Take 2,000 Units by mouth daily. (Patient taking differently: Take 1,000 Units by mouth daily ) 100 tablet 3     calcium carbonate (TUMS) 500 MG chewable tablet Take 1 tablet by mouth 2 times daily. 180 chew tab 3     ORDER FOR DME Pt was set up on a Respironics RemStar 60 Series Auto AFlex 5-15 cm H2O with heated humidity and a modem. Pt chose a Mirage FX nasal mask standard size.       ASPIRIN NOT PRESCRIBED, INTENTIONAL, Antiplatelet medication not prescribed intentionally due  to Plavix  0     IBUPROFEN PO Take 400-600 mg by mouth as needed.         cyanocobalamin (B-12) 1000 MCG TBCR Take 1,000 mcg by mouth daily. 100 tablet 1     [DISCONTINUED] clopidogrel (PLAVIX) 75 MG tablet TAKE ONE TABLET BY MOUTH ONCE DAILY 30 tablet 0     [DISCONTINUED] atorvastatin (LIPITOR) 20 MG tablet TAKE ONE TABLET BY MOUTH ONCE DAILY 30 tablet 0     [DISCONTINUED] lisinopril (PRINIVIL/ZESTRIL) 2.5 MG tablet Take 1 tablet (2.5 mg) by mouth daily 90 tablet 3     No Known Allergies  Labs reviewed in EPIC    Reviewed and updated as needed this visit by clinical staff       Reviewed and updated as needed this visit by Provider       Review of Systems   Constitutional: Negative for diaphoresis, fever, malaise/fatigue and weight loss.   HENT: Negative for congestion, ear discharge, ear pain, hearing loss, nosebleeds and sore throat.    Eyes: Negative for blurred vision, double vision, photophobia, pain, discharge and redness.   Respiratory: Negative for cough, hemoptysis, sputum production, shortness of breath and wheezing.    Cardiovascular: Negative for chest pain, palpitations, orthopnea and leg swelling.   Gastrointestinal: Negative for abdominal pain, blood in stool, constipation, diarrhea, heartburn, melena, nausea and vomiting.   Genitourinary: Negative.  Negative for dysuria, frequency and urgency.   Musculoskeletal: Negative for back pain, joint pain, myalgias and neck pain.   Skin: Negative for itching and rash.   Neurological: Negative.  Negative for dizziness, tingling, sensory change, focal weakness, seizures, loss of consciousness, weakness and headaches.   Endo/Heme/Allergies: Negative.    Psychiatric/Behavioral: Negative for depression, hallucinations, substance abuse and suicidal ideas. The patient is not nervous/anxious and does not have insomnia.          Physical Exam   Constitutional: He is oriented to person, place, and time and well-developed, well-nourished, and in no distress.   HENT:    Head: Normocephalic and atraumatic.   Right Ear: External ear normal.   Left Ear: External ear normal.   Nose: Nose normal.   Mouth/Throat: Oropharynx is clear and moist.   Eyes: Conjunctivae and EOM are normal. Pupils are equal, round, and reactive to light. Right eye exhibits no discharge. Left eye exhibits no discharge. No scleral icterus.   Neck: Normal range of motion. Neck supple. No thyromegaly present.   Cardiovascular: Normal rate, regular rhythm, normal heart sounds and intact distal pulses.  Exam reveals no gallop and no friction rub.    No murmur heard.  Pulmonary/Chest: Effort normal and breath sounds normal. No respiratory distress. He has no wheezes. He has no rales. He exhibits no tenderness.   Abdominal: Soft. Bowel sounds are normal. He exhibits no distension and no mass. There is no tenderness. There is no rebound and no guarding.   Musculoskeletal: Normal range of motion. He exhibits no edema or tenderness.   Lymphadenopathy:     He has no cervical adenopathy.   Neurological: He is alert and oriented to person, place, and time. He has normal reflexes. No cranial nerve deficit. He exhibits normal muscle tone. Gait normal. Coordination normal.   Skin: Skin is warm and dry. No rash noted. No erythema.   Psychiatric: Mood, memory, affect and judgment normal.       (E78.5) Hyperlipidemia LDL goal <100  (primary encounter diagnosis)  Comment:   Plan: Lipid Profile (Chol, Trig, HDL, LDL calc),         Hemoglobin A1c, atorvastatin (LIPITOR) 20 MG         tablet            (I63.10) Cerebrovascular accident (CVA) due to embolism of precerebral artery (H)  Comment:   Plan: clopidogrel (PLAVIX) 75 MG tablet,         Comprehensive metabolic panel (BMP + Alb, Alk         Phos, ALT, AST, Total. Bili, TP)            (Z12.11) Special screening for malignant neoplasms, colon  Comment:   Plan: GASTROENTEROLOGY ADULT REF PROCEDURE ONLY            (Z12.5) Screening for prostate cancer  Comment:   Plan: PSA,  screen            (I10) Hypertension goal BP (blood pressure) < 130/80  Comment:   Plan:    (I63.10) Cerebral infarction due to embolism of precerebral artery (H)  Comment:   Plan:     (F32.5) Major depression in complete remission (H)  Comment:   Plan:     (I63.429) Cerebral infarction due to embolism of anterior cerebral artery, unspecified blood vessel laterality (H)  Comment:   Plan: lisinopril (PRINIVIL/ZESTRIL) 2.5 MG tablet            (K21.9) Gastroesophageal reflux disease without esophagitis  Comment:   Plan: LANsoprazole (PREVACID SOLUTAB) 30 MG ODT tab            Medications were refilled and labs were ordered and colonoscopy was also ordered and he will follow-up within the year or sooner if needed depending upon the results of labs and screening testing.

## 2018-03-28 ENCOUNTER — TELEPHONE (OUTPATIENT)
Dept: FAMILY MEDICINE | Facility: CLINIC | Age: 53
End: 2018-03-28

## 2018-03-28 NOTE — TELEPHONE ENCOUNTER
Patient is calling requesting to speak with someone about stopping medications before a colonoscopy. Please advise.    Rosalina [place] [place]

## 2018-05-07 ENCOUNTER — TELEPHONE (OUTPATIENT)
Dept: FAMILY MEDICINE | Facility: CLINIC | Age: 53
End: 2018-05-07

## 2018-05-07 NOTE — TELEPHONE ENCOUNTER
Reason for Call:  Other     Detailed comments: Patient is wondering if he should stop his Plavix before a colonoscopy? - please call pt    Phone Number Patient can be reached at: Home number on file [phone] (home)    Best Time:     Can we leave a detailed message on this number? YES    Call taken on 5/7/2018 at 11:30 AM by Trixie Mcdermott

## 2018-05-11 ENCOUNTER — ANESTHESIA EVENT (OUTPATIENT)
Dept: GASTROENTEROLOGY | Facility: CLINIC | Age: 53
End: 2018-05-11
Payer: COMMERCIAL

## 2018-05-11 ASSESSMENT — LIFESTYLE VARIABLES: TOBACCO_USE: 1

## 2018-05-11 NOTE — ANESTHESIA PREPROCEDURE EVALUATION
Anesthesia Evaluation     . Pt has had prior anesthetic. Type: General    No history of anesthetic complications          ROS/MED HX    ENT/Pulmonary:     (+)sleep apnea, tobacco use, Past use uses CPAP , . .    Neurologic:     (+)CVA date: 2012     Cardiovascular:     (+) hypertension----. Taking blood thinners : . . . :. .       METS/Exercise Tolerance:  >4 METS   Hematologic:     (+) History of blood clots -      Musculoskeletal:  - neg musculoskeletal ROS       GI/Hepatic:  - neg GI/hepatic ROS       Renal/Genitourinary:  - ROS Renal section negative       Endo:     (+) Obesity, Other Endocrine Disorder morbid obesity, prediabetes.      Psychiatric:     (+) psychiatric history anxiety and depression      Infectious Disease:  - neg infectious disease ROS       Malignancy:      - no malignancy   Other:    - neg other ROS                 Physical Exam  Normal systems: cardiovascular, pulmonary and dental    Airway   Mallampati: II  TM distance: >3 FB  Neck ROM: full    Dental     Cardiovascular   Rhythm and rate: regular and normal      Pulmonary    breath sounds clear to auscultation                    Anesthesia Plan      History & Physical Review  History and physical reviewed and following examination; no interval change.    ASA Status:  3 .    NPO Status:  > 6 hours    Plan for MAC and General with Propofol induction. Maintenance will be TIVA.  Reason for MAC:  Deep or markedly invasive procedure (G8)         Postoperative Care      Consents  Anesthetic plan, risks, benefits and alternatives discussed with:  Patient..                          .

## 2018-05-14 ENCOUNTER — HOSPITAL ENCOUNTER (OUTPATIENT)
Facility: CLINIC | Age: 53
Discharge: HOME | End: 2018-05-14
Attending: SURGERY | Admitting: SURGERY
Payer: COMMERCIAL

## 2018-05-14 ENCOUNTER — ANESTHESIA (OUTPATIENT)
Dept: GASTROENTEROLOGY | Facility: CLINIC | Age: 53
End: 2018-05-14
Payer: COMMERCIAL

## 2018-05-14 ENCOUNTER — SURGERY (OUTPATIENT)
Age: 53
End: 2018-05-14

## 2018-05-14 VITALS
HEIGHT: 70 IN | RESPIRATION RATE: 16 BRPM | WEIGHT: 281 LBS | DIASTOLIC BLOOD PRESSURE: 62 MMHG | HEART RATE: 74 BPM | BODY MASS INDEX: 40.23 KG/M2 | SYSTOLIC BLOOD PRESSURE: 108 MMHG | OXYGEN SATURATION: 92 %

## 2018-05-14 LAB — COLONOSCOPY: NORMAL

## 2018-05-14 PROCEDURE — 25000128 H RX IP 250 OP 636: Performed by: SURGERY

## 2018-05-14 PROCEDURE — 37000008 ZZH ANESTHESIA TECHNICAL FEE, 1ST 30 MIN: Performed by: SURGERY

## 2018-05-14 PROCEDURE — 45378 DIAGNOSTIC COLONOSCOPY: CPT | Performed by: SURGERY

## 2018-05-14 PROCEDURE — 25000128 H RX IP 250 OP 636: Performed by: NURSE ANESTHETIST, CERTIFIED REGISTERED

## 2018-05-14 PROCEDURE — G0121 COLON CA SCRN NOT HI RSK IND: HCPCS | Performed by: SURGERY

## 2018-05-14 PROCEDURE — 25000125 ZZHC RX 250: Performed by: SURGERY

## 2018-05-14 RX ORDER — ONDANSETRON 2 MG/ML
4 INJECTION INTRAMUSCULAR; INTRAVENOUS
Status: DISCONTINUED | OUTPATIENT
Start: 2018-05-14 | End: 2018-05-14 | Stop reason: HOSPADM

## 2018-05-14 RX ORDER — LIDOCAINE 40 MG/G
CREAM TOPICAL
Status: DISCONTINUED | OUTPATIENT
Start: 2018-05-14 | End: 2018-05-14 | Stop reason: HOSPADM

## 2018-05-14 RX ORDER — SODIUM CHLORIDE, SODIUM LACTATE, POTASSIUM CHLORIDE, CALCIUM CHLORIDE 600; 310; 30; 20 MG/100ML; MG/100ML; MG/100ML; MG/100ML
INJECTION, SOLUTION INTRAVENOUS CONTINUOUS
Status: DISCONTINUED | OUTPATIENT
Start: 2018-05-14 | End: 2018-05-14 | Stop reason: HOSPADM

## 2018-05-14 RX ORDER — PROPOFOL 10 MG/ML
INJECTION, EMULSION INTRAVENOUS CONTINUOUS PRN
Status: DISCONTINUED | OUTPATIENT
Start: 2018-05-14 | End: 2018-05-14

## 2018-05-14 RX ORDER — PROPOFOL 10 MG/ML
INJECTION, EMULSION INTRAVENOUS PRN
Status: DISCONTINUED | OUTPATIENT
Start: 2018-05-14 | End: 2018-05-14

## 2018-05-14 RX ADMIN — SODIUM CHLORIDE, POTASSIUM CHLORIDE, SODIUM LACTATE AND CALCIUM CHLORIDE: 600; 310; 30; 20 INJECTION, SOLUTION INTRAVENOUS at 11:44

## 2018-05-14 RX ADMIN — PROPOFOL 100 MG: 10 INJECTION, EMULSION INTRAVENOUS at 12:08

## 2018-05-14 RX ADMIN — LIDOCAINE HYDROCHLORIDE 40 MG: 10 INJECTION, SOLUTION EPIDURAL; INFILTRATION; INTRACAUDAL; PERINEURAL at 12:08

## 2018-05-14 RX ADMIN — LIDOCAINE HYDROCHLORIDE 0.3 ML: 10 INJECTION, SOLUTION EPIDURAL; INFILTRATION; INTRACAUDAL; PERINEURAL at 11:44

## 2018-05-14 RX ADMIN — PROPOFOL 200 MCG/KG/MIN: 10 INJECTION, EMULSION INTRAVENOUS at 12:08

## 2018-05-14 NOTE — H&P
"52 year old year old male here for colonoscopy for screening.    Patient Active Problem List   Diagnosis     Advanced care planning/counseling discussion     Cerebral embolism with cerebral infarction (H)     Hyperlipidemia LDL goal <100     Paresthesia     Hypertension goal BP (blood pressure) < 130/80     CVA (cerebral vascular accident) (H)     MADDY (obstructive sleep apnea)     Major depression in complete remission (H)     Generalized anxiety disorder       Past Medical History:   Diagnosis Date     Hypertension     DIAGNOSISED MAY 2012     Unspecified cerebral artery occlusion with cerebral infarction        Past Surgical History:   Procedure Laterality Date     ENT SURGERY      NO[facility] IN PAST NO SURGURY       [facility]    No current outpatient prescriptions on file.       No Known Allergies    Pt reports that he quit smoking about 8 years ago. His smoking use included Cigarettes. He has a 25.00 pack-year smoking history. He has never used smokeless tobacco. He reports that he does not drink alcohol or use illicit drugs.    Exam:  Ht 1.778 m (5' 10\")  Wt 127.5 kg (281 lb)  BMI 40.32 kg/m2    Awake, Alert OX3  Lungs - CTA bilaterally  CV - RRR, no murmurs, distal pulses intact  Abd - soft, non-distended, non-tender, +BS  Extr - No cyanosis or edema    A/P 52 year old year old male in need of colonoscopy for screening. Risks, benefits, alternatives, and complications were discussed including the possibility of perforation and the patient agreed to proceed    Glynn Guardado MD     "

## 2018-05-14 NOTE — ANESTHESIA POSTPROCEDURE EVALUATION
Patient: Lyle Tenorio    Procedure(s):  colonoscopy - Wound Class: II-Clean Contaminated    Diagnosis:screening  Diagnosis Additional Information: No value filed.    Anesthesia Type:  MAC, General    Note:  Anesthesia Post Evaluation    Patient location during evaluation: Phase 2 and Bedside  Patient participation: Able to fully participate in evaluation  Level of consciousness: awake and alert  Pain management: adequate  Airway patency: patent  Cardiovascular status: acceptable  Respiratory status: acceptable  Hydration status: acceptable  PONV: none     Anesthetic complications: None          Last vitals:  Vitals:    05/14/18 1123 05/14/18 1226 05/14/18 1230   BP: 145/80 100/47 108/62   Pulse: 82 74 74   Resp:  16 16   SpO2: 97% 94% 92%         Electronically Signed By: Andrew Cavazos CRNA, APRN CRNA  May 14, 2018  12:53 PM

## 2018-05-14 NOTE — ANESTHESIA CARE TRANSFER NOTE
Patient: Lyle Tenorio    Procedure(s):  colonoscopy - Wound Class: II-Clean Contaminated    Diagnosis: screening  Diagnosis Additional Information: No value filed.    Anesthesia Type:   MAC, General     Note:  Airway :Nasal Cannula  Patient transferred to:Phase II  Handoff Report: Identifed the Patient, Identified the Reponsible Provider, Reviewed the pertinent medical history, Discussed the surgical course, Reviewed Intra-OP anesthesia mangement and issues during anesthesia, Set expectations for post-procedure period and Allowed opportunity for questions and acknowledgement of understanding      Vitals: (Last set prior to Anesthesia Care Transfer)    CRNA VITALS  5/14/2018 1154 - 5/14/2018 1224      5/14/2018             Pulse: 79    SpO2: 92 %                Electronically Signed By: Andrew Cavazos CRNA, APRN CRNA  May 14, 2018  12:24 PM

## 2018-07-11 ENCOUNTER — TELEPHONE (OUTPATIENT)
Dept: FAMILY MEDICINE | Facility: CLINIC | Age: 53
End: 2018-07-11

## 2018-07-11 NOTE — TELEPHONE ENCOUNTER
Reason for call:  Other   Patient called regarding (reason for call): call back  Additional comments: Patient is looking to Transfer of care to  would like a return call to discuss healthcare plan moving forward. Please advise    Phone number to reach patient:  Cell number on file:    Telephone Information:   Mobile [phone]       Best Time:  Anytime before 1    Can we leave a detailed message on this number?  YES

## 2019-01-14 ENCOUNTER — TRANSFERRED RECORDS (OUTPATIENT)
Dept: HEALTH INFORMATION MANAGEMENT | Facility: CLINIC | Age: 54
End: 2019-01-14

## 2019-01-14 NOTE — PROGRESS NOTES
SUBJECTIVE:   Lyle Tenorio is a 53 year old male who presents to clinic today for the following health issues:    Musculoskeletal problem/pain  Comes with swollen right ankle       Duration: 2 weeks     Description  Location: right ankle     Intensity:  moderate    Accompanying signs and symptoms: warmth and swelling    History  Previous similar problem: no - broke in high school   Previous evaluation:  none    Precipitating or alleviating factors:  Trauma or overuse: no   Aggravating factors include: overuse    Therapies tried and outcome: NSAID - Advil, elevation     Leg pain- will get leg cramps at night           Problem list and histories reviewed & adjusted, as indicated.  Additional history: as documented    Patient Active Problem List   Diagnosis     Advanced care planning/counseling discussion     Cerebral embolism with cerebral infarction (H)     Hyperlipidemia LDL goal <100     Paresthesia     Hypertension goal BP (blood pressure) < 130/80     CVA (cerebral vascular accident) (H)     MADDY (obstructive sleep apnea)     Major depression in complete remission (H)     Generalized anxiety disorder     Past Surgical History:   Procedure Laterality Date     COLONOSCOPY N/A 2018    Procedure: COLONOSCOPY;  colonoscopy;  Surgeon: Glynn Guardado MD;  Location: [facility]     ENT SURGERY      NOSE BLLED IN PAST NO SURGURY       Social History     Tobacco Use     Smoking status: Former Smoker     Packs/day: 1.00     Years: 25.00     Pack years: 25.00     Types: Cigarettes     Last attempt to quit: 3/28/2010     Years since quittin.8     Smokeless tobacco: Never Used   Substance Use Topics     Alcohol use: No     Alcohol/week: 3.0 oz     Types: 6 Standard drinks or equivalent per week     Family History   Problem Relation Age of Onset     Heart Disease Mother         MI late 50's age     Diabetes Father      Blood Disease Father         clots     Breast Cancer Maternal Grandmother      Heart Disease Maternal  Grandfather      Alcohol/Drug Paternal Grandfather      Blood Disease Brother         clots-antiptrip     Diabetes Brother 52        Type II         Current Outpatient Medications   Medication Sig Dispense Refill     Ascorbic Acid (VITAMIN C PO) Take 1,000 mg by mouth        ASPIRIN NOT PRESCRIBED, INTENTIONAL, Antiplatelet medication not prescribed intentionally due to Plavix  0     atorvastatin (LIPITOR) 20 MG tablet Take 1 tablet (20 mg) by mouth daily 90 tablet 3     calcium carbonate (TUMS) 500 MG chewable tablet Take 1 tablet by mouth 2 times daily. 180 chew tab 3     Cholecalciferol (VITAMIN D) 2000 UNITS tablet Take 2,000 Units by mouth daily. (Patient taking differently: Take 1,000 Units by mouth daily ) 100 tablet 3     clopidogrel (PLAVIX) 75 MG tablet Take 1 tablet (75 mg) by mouth daily 90 tablet 3     cyanocobalamin (B-12) 1000 MCG TBCR Take 1,000 mcg by mouth daily. 100 tablet 1     IBUPROFEN PO Take 400-600 mg by mouth as needed.         LANsoprazole (PREVACID SOLUTAB) 30 MG ODT tab Take 1 tablet (30 mg) by mouth as needed 90 tablet 3     lisinopril (PRINIVIL/ZESTRIL) 2.5 MG tablet Take 1 tablet (2.5 mg) by mouth daily 90 tablet 3     order for DME  HEATED HUMIDITY   CHIN STRAP   NON-DSPOSABLE FILTERS   DISPOSABLE FILTERS (2 PER 1 MONTHS)   NASAL PILLOW (2 PER 1 MONTHS)   FULL FACE MASK  (1 PER 3 MONTHS)   FULL FACE CUSHION  (1 PER 1 MONTHS)   HUMIDIFIER CHAMBER (1 PER 6 MONTHS)   MASK (1 PER 3 MONTHS)   NASAL CUSHION (2 PER 1 MONTHS)   HEADGEAR (1 PER 6 MONTHS)   TUBING (1 PER 3 MONTHS)   HEATED TUBING (1 PER 3 MONTHS) 1 Device 1     ORDER FOR DME Pt was set up on a Respironics RemStar 60 Series Auto AFlex 5-15 cm H2O with heated humidity and a modem. Pt chose a Mirage FX nasal mask standard size.       Allergies   Allergen Reactions     Nka [No Known Allergies]      Recent Labs   Lab Test 03/26/18  0848 03/16/17  0820 02/01/16  1011   "10/29/13  1135  03/28/12  1147   A1C 6.1* 6.0 6.1*  --   --   --   --    LDL 52 70 65   < > 93   < >  --    HDL 53 51 56   < > 64   < >  --    TRIG 246* 210* 137   < > 85   < >  --    ALT 33 41 46   < >  --    < > 58   CR 0.79 0.72 0.80   < >  --    < >  --    GFRESTIMATED >90 >90  Non  GFR Calc   >90  Non  GFR Calc     < >  --    < >  --    GFRESTBLACK >90 >90   GFR Calc   >90   GFR Calc     < >  --    < >  --    POTASSIUM 4.0 4.0 4.3   < >  --    < >  --    TSH  --   --   --   --  1.66  --  2.55    < > = values in this interval not displayed.        Reviewed and updated as needed this visit by clinical staff       Reviewed and updated as needed this visit by Provider         ROS:  Constitutional, HEENT, cardiovascular, pulmonary, gi and gu systems are negative, except as otherwise noted.    OBJECTIVE:     /84 (Cuff Size: Adult Regular)   Pulse 64   Temp 97.5  F (36.4  C) (Tympanic)   Resp 16   Ht 1.778 m (5' 10\")   Wt 132 kg (291 lb)   BMI 41.75 kg/m    Body mass index is 41.75 kg/m .  GENERAL: healthy, alert and no distress  NECK: no adenopathy, no asymmetry, masses, or scars and thyroid normal to palpation  RESP: lungs clear to auscultation - no rales, rhonchi or wheezes  CV: regular rate and rhythm, normal S1 S2, no S3 or S4, no murmur, click or rub, no peripheral edema and peripheral pulses strong  ABDOMEN: soft, nontender, no hepatosplenomegaly, no masses and bowel sounds normal  MS: no gross musculoskeletal defects noted, no edema        ASSESSMENT/PLAN:             1. Right ankle swelling    - XR Ankle Right G/E 3 Views  - Uric acid  - **Lyme Disease Miladis with reflex to WB Serum FUTURE 14d; Future  - ESR: Erythrocyte sedimentation rate  - Rheumatoid factor    ASSESSMENT/PLAN:      ICD-10-CM    1. Right ankle swelling M25.471 XR Ankle Right G/E 3 Views     Uric acid     **Lyme Disease Miladis with reflex to WB Serum FUTURE 14d     ESR: " Erythrocyte sedimentation rate     Rheumatoid factor       Patient Instructions   1.  The xray is normal    2. You have an acute arthritis     3. Lets do labs to help clarify the diagnosis.    4. Go ahead and use ibuprofen for now.  800 mg two times a day.     5. I will call on labs.          Arnold Washington MD  [facility] [address]

## 2019-01-15 ENCOUNTER — ANCILLARY PROCEDURE (OUTPATIENT)
Dept: GENERAL RADIOLOGY | Facility: CLINIC | Age: 54
End: 2019-01-15
Payer: COMMERCIAL

## 2019-01-15 ENCOUNTER — OFFICE VISIT (OUTPATIENT)
Dept: FAMILY MEDICINE | Facility: CLINIC | Age: 54
End: 2019-01-15
Payer: COMMERCIAL

## 2019-01-15 VITALS
TEMPERATURE: 97.5 F | HEIGHT: 70 IN | HEART RATE: 64 BPM | RESPIRATION RATE: 16 BRPM | WEIGHT: 291 LBS | BODY MASS INDEX: 41.66 KG/M2 | SYSTOLIC BLOOD PRESSURE: 122 MMHG | DIASTOLIC BLOOD PRESSURE: 84 MMHG

## 2019-01-15 DIAGNOSIS — M25.471 RIGHT ANKLE SWELLING: Primary | ICD-10-CM

## 2019-01-15 LAB
ERYTHROCYTE [SEDIMENTATION RATE] IN BLOOD BY WESTERGREN METHOD: 7 MM/H (ref 0–20)
URATE SERPL-MCNC: 7.8 MG/DL (ref 3.5–7.2)

## 2019-01-15 PROCEDURE — 36415 COLL VENOUS BLD VENIPUNCTURE: CPT | Performed by: FAMILY MEDICINE

## 2019-01-15 PROCEDURE — 84550 ASSAY OF BLOOD/URIC ACID: CPT | Performed by: FAMILY MEDICINE

## 2019-01-15 PROCEDURE — 85652 RBC SED RATE AUTOMATED: CPT | Performed by: FAMILY MEDICINE

## 2019-01-15 PROCEDURE — 86431 RHEUMATOID FACTOR QUANT: CPT | Performed by: FAMILY MEDICINE

## 2019-01-15 PROCEDURE — 73610 X-RAY EXAM OF ANKLE: CPT | Mod: RT

## 2019-01-15 PROCEDURE — 99213 OFFICE O/P EST LOW 20 MIN: CPT | Performed by: FAMILY MEDICINE

## 2019-01-15 RX ORDER — PREDNISONE 20 MG/1
20 TABLET ORAL 2 TIMES DAILY
Qty: 14 TABLET | Refills: 0 | Status: SHIPPED | OUTPATIENT
Start: 2019-01-15 | End: 2019-07-17

## 2019-01-15 ASSESSMENT — ANXIETY QUESTIONNAIRES
3. WORRYING TOO MUCH ABOUT DIFFERENT THINGS: NOT AT ALL
GAD7 TOTAL SCORE: 0
1. FEELING NERVOUS, ANXIOUS, OR ON EDGE: NOT AT ALL
2. NOT BEING ABLE TO STOP OR CONTROL WORRYING: NOT AT ALL
6. BECOMING EASILY ANNOYED OR IRRITABLE: NOT AT ALL
5. BEING SO RESTLESS THAT IT IS HARD TO SIT STILL: NOT AT ALL
7. FEELING AFRAID AS IF SOMETHING AWFUL MIGHT HAPPEN: NOT AT ALL

## 2019-01-15 ASSESSMENT — PATIENT HEALTH QUESTIONNAIRE - PHQ9
5. POOR APPETITE OR OVEREATING: NOT AT ALL
SUM OF ALL RESPONSES TO PHQ QUESTIONS 1-9: 0

## 2019-01-15 ASSESSMENT — MIFFLIN-ST. JEOR: SCORE: 2181.22

## 2019-01-15 NOTE — PATIENT INSTRUCTIONS
1.  The xray is normal    2. You have an acute arthritis     3. Lets do labs to help clarify the diagnosis.    4. Go ahead and use ibuprofen for now.  800 mg two times a day.     5. I will call on labs.

## 2019-01-16 LAB — RHEUMATOID FACT SER NEPH-ACNC: <20 IU/ML (ref 0–20)

## 2019-01-16 ASSESSMENT — ANXIETY QUESTIONNAIRES: GAD7 TOTAL SCORE: 0

## 2019-01-23 ENCOUNTER — OFFICE VISIT (OUTPATIENT)
Dept: FAMILY MEDICINE | Facility: CLINIC | Age: 54
End: 2019-01-23
Payer: COMMERCIAL

## 2019-01-23 VITALS
HEART RATE: 71 BPM | WEIGHT: 287.6 LBS | DIASTOLIC BLOOD PRESSURE: 82 MMHG | OXYGEN SATURATION: 97 % | SYSTOLIC BLOOD PRESSURE: 122 MMHG | BODY MASS INDEX: 41.27 KG/M2 | TEMPERATURE: 97.3 F

## 2019-01-23 DIAGNOSIS — M10.9 ACUTE GOUT OF RIGHT ANKLE, UNSPECIFIED CAUSE: Primary | ICD-10-CM

## 2019-01-23 PROCEDURE — 99213 OFFICE O/P EST LOW 20 MIN: CPT | Performed by: FAMILY MEDICINE

## 2019-01-23 RX ORDER — INDOMETHACIN 50 MG/1
50 CAPSULE ORAL
Qty: 30 CAPSULE | Refills: 3 | Status: SHIPPED | OUTPATIENT
Start: 2019-01-23 | End: 2019-04-17

## 2019-01-23 RX ORDER — ALLOPURINOL 100 MG/1
100 TABLET ORAL DAILY
Qty: 90 TABLET | Refills: 3 | Status: SHIPPED | OUTPATIENT
Start: 2019-01-23 | End: 2019-12-30

## 2019-01-23 ASSESSMENT — PAIN SCALES - GENERAL: PAINLEVEL: NO PAIN (1)

## 2019-01-23 NOTE — NURSING NOTE
"Chief Complaint   Patient presents with     Musculoskeletal Problem       Initial /82 (BP Location: Right arm, Patient Position: Sitting, Cuff Size: Adult Large)   Pulse 71   Temp 97.3  F (36.3  C) (Tympanic)   Wt 130.5 kg (287 lb 9.6 oz)   SpO2 97%   BMI 41.27 kg/m   Estimated body mass index is 41.27 kg/m  as calculated from the following:    Height as of 1/15/19: 1.778 m (5' 10\").    Weight as of this encounter: 130.5 kg (287 lb 9.6 oz).    Patient presents to the clinic using No DME    Health Maintenance that is potentially due pending provider review:  NONE    n/a    Is there anyone who you would like to be able to receive your results? No  If yes have patient fill out MIRNA    Brandee Woo CMA    "

## 2019-01-23 NOTE — PATIENT INSTRUCTIONS
1.  This is gout.    2. Might be good idea to limit beer.     3. Use the allopurinol for perfection.    4. Have the indocin for acute flare.

## 2019-01-23 NOTE — PROGRESS NOTES
SUBJECTIVE:   Lyle Tenorio is a 53 year old male who presents to clinic today for the following health issues:    Follow up Gout   Much better.       Duration: :Last week     Description (location/character/radiation): Recheck after treating Gout in right foot.     Intensity:  moderate    Accompanying signs and symptoms: none    History (similar episodes/previous evaluation): None    Precipitating or alleviating factors: None    Therapies tried and outcome: Prednisone           Problem list and histories reviewed & adjusted, as indicated.  Additional history: as documented    Patient Active Problem List   Diagnosis     Advanced care planning/counseling discussion     Cerebral embolism with cerebral infarction (H)     Hyperlipidemia LDL goal <100     Paresthesia     Hypertension goal BP (blood pressure) < 130/80     CVA (cerebral vascular accident) (H)     MADDY (obstructive sleep apnea)     Major depression in complete remission (H)     Generalized anxiety disorder     Past Surgical History:   Procedure Laterality Date     COLONOSCOPY N/A 2018    Procedure: COLONOSCOPY;  colonoscopy;  Surgeon: Glynn Guardado MD;  Location: [facility]     ENT SURGERY      NOSE BLLED IN PAST NO SURGURY       Social History     Tobacco Use     Smoking status: Former Smoker     Packs/day: 1.00     Years: 25.00     Pack years: 25.00     Types: Cigarettes     Last attempt to quit: 3/28/2010     Years since quittin.8     Smokeless tobacco: Never Used   Substance Use Topics     Alcohol use: No     Alcohol/week: 3.0 oz     Types: 6 Standard drinks or equivalent per week     Family History   Problem Relation Age of Onset     Heart Disease Mother         MI late 50's age     Diabetes Father      Blood Disease Father         clots     Breast Cancer Maternal Grandmother      Heart Disease Maternal Grandfather      Alcohol/Drug Paternal Grandfather      Blood Disease Brother         clots-antiptrip     Diabetes Brother 52        Type II          Current Outpatient Medications   Medication Sig Dispense Refill     allopurinol (ZYLOPRIM) 100 MG tablet Take 1 tablet (100 mg) by mouth daily 90 tablet 3     Ascorbic Acid (VITAMIN C PO) Take 1,000 mg by mouth        ASPIRIN NOT PRESCRIBED, INTENTIONAL, Antiplatelet medication not prescribed intentionally due to Plavix  0     atorvastatin (LIPITOR) 20 MG tablet Take 1 tablet (20 mg) by mouth daily 90 tablet 3     calcium carbonate (TUMS) 500 MG chewable tablet Take 1 tablet by mouth 2 times daily. 180 chew tab 3     Cholecalciferol (VITAMIN D) 2000 UNITS tablet Take 2,000 Units by mouth daily. (Patient taking differently: Take 1,000 Units by mouth daily ) 100 tablet 3     clopidogrel (PLAVIX) 75 MG tablet Take 1 tablet (75 mg) by mouth daily 90 tablet 3     cyanocobalamin (B-12) 1000 MCG TBCR Take 1,000 mcg by mouth daily. 100 tablet 1     IBUPROFEN PO Take 400-600 mg by mouth as needed.         indomethacin (INDOCIN) 50 MG capsule Take 1 capsule (50 mg) by mouth 3 times daily (with meals) 30 capsule 3     LANsoprazole (PREVACID SOLUTAB) 30 MG ODT tab Take 1 tablet (30 mg) by mouth as needed 90 tablet 3     lisinopril (PRINIVIL/ZESTRIL) 2.5 MG tablet Take 1 tablet (2.5 mg) by mouth daily 90 tablet 3     order for DME  HEATED HUMIDITY   CHIN STRAP   NON-DSPOSABLE FILTERS   DISPOSABLE FILTERS (2 PER 1 MONTHS)   NASAL PILLOW (2 PER 1 MONTHS)   FULL FACE MASK  (1 PER 3 MONTHS)   FULL FACE CUSHION  (1 PER 1 MONTHS)   HUMIDIFIER CHAMBER (1 PER 6 MONTHS)   MASK (1 PER 3 MONTHS)   NASAL CUSHION (2 PER 1 MONTHS)   HEADGEAR (1 PER 6 MONTHS)   TUBING (1 PER 3 MONTHS)   HEATED TUBING (1 PER 3 MONTHS) 1 Device 1     ORDER FOR DME Pt was set up on a [employer] RemStar 60 Series Auto AFlex 5-15 cm H2O with heated humidity and a modem. Pt chose a Mirage FX nasal mask standard size.       Allergies   Allergen Reactions     Nka [No Known Allergies]        Reviewed  and updated as needed this visit by clinical staff       Reviewed and updated as needed this visit by Provider         ROS:  CONSTITUTIONAL: NEGATIVE for fever, chills, change in weight  ENT/MOUTH: NEGATIVE for ear, mouth and throat problems  RESP: NEGATIVE for significant cough or SOB  CV: NEGATIVE for chest pain, palpitations or peripheral edema    OBJECTIVE:     /82 (BP Location: Right arm, Patient Position: Sitting, Cuff Size: Adult Large)   Pulse 71   Temp 97.3  F (36.3  C) (Tympanic)   Wt 130.5 kg (287 lb 9.6 oz)   SpO2 97%   BMI 41.27 kg/m    Body mass index is 41.27 kg/m .  GENERAL: healthy, alert and no distress  NECK: no adenopathy, no asymmetry, masses, or scars and thyroid normal to palpation  RESP: lungs clear to auscultation - no rales, rhonchi or wheezes  CV: regular rate and rhythm, normal S1 S2, no S3 or S4, no murmur, click or rub, no peripheral edema and peripheral pulses strong  ABDOMEN: soft, nontender, no hepatosplenomegaly, no masses and bowel sounds normal  MS: no gross musculoskeletal defects noted, no edema        ASSESSMENT/PLAN:             1. Acute gout of right ankle, unspecified cause    - indomethacin (INDOCIN) 50 MG capsule; Take 1 capsule (50 mg) by mouth 3 times daily (with meals)  Dispense: 30 capsule; Refill: 3  - allopurinol (ZYLOPRIM) 100 MG tablet; Take 1 tablet (100 mg) by mouth daily  Dispense: 90 tablet; Refill: 3    ASSESSMENT/PLAN:      ICD-10-CM    1. Acute gout of right ankle, unspecified cause M10.9 indomethacin (INDOCIN) 50 MG capsule     allopurinol (ZYLOPRIM) 100 MG tablet       Patient Instructions   1.  This is gout.    2. Might be good idea to limit beer.     3. Use the allopurinol for perfection.    4. Have the indocin for acute flare.          Arnold Washington MD  [facility]

## 2019-02-18 DIAGNOSIS — K21.9 GASTROESOPHAGEAL REFLUX DISEASE WITHOUT ESOPHAGITIS: ICD-10-CM

## 2019-02-18 NOTE — TELEPHONE ENCOUNTER
"Requested Prescriptions   Pending Prescriptions Disp Refills     LANsoprazole (PREVACID SOLUTAB) 30 MG ODT 90 tablet 3    Last Written Prescription Date:  3/26/18  Last Fill Quantity: 90 tab,  # refills: 3   Last office visit: 1/23/2019 with prescribing provider:  Padmini   Future Office Visit:     Sig: Take 1 tablet (30 mg) by mouth as needed    PPI Protocol Failed - 2/18/2019  5:54 PM       Failed - Not on Clopidogrel (unless Pantoprazole ordered)       Failed - Recent (12 mo) or future (30 days) visit within the authorizing provider's specialty    Patient had office visit in the last 12 months or has a visit in the next 30 days with authorizing provider or within the authorizing provider's specialty.  See \"Patient Info\" tab in inbasket, or \"Choose Columns\" in Meds & Orders section of the refill encounter.             Passed - No diagnosis of osteoporosis on record       Passed - Medication is active on med list       Passed - Patient is age 18 or older          "

## 2019-04-12 DIAGNOSIS — I63.10 CEREBROVASCULAR ACCIDENT (CVA) DUE TO EMBOLISM OF PRECEREBRAL ARTERY (H): ICD-10-CM

## 2019-04-12 RX ORDER — CLOPIDOGREL BISULFATE 75 MG/1
75 TABLET ORAL DAILY
Qty: 30 TABLET | Refills: 0 | Status: SHIPPED | OUTPATIENT
Start: 2019-04-12 | End: 2019-04-17

## 2019-04-12 NOTE — TELEPHONE ENCOUNTER
"Requested Prescriptions   Pending Prescriptions Disp Refills     clopidogrel (PLAVIX) 75 MG tablet 90 tablet 3     Sig: Take 1 tablet (75 mg) by mouth daily       Plavix Failed - 4/12/2019  9:55 AM        Failed - No active PPI on record unless is Protonix        Failed - Normal HGB on file in past 12 months     Recent Labs   Lab Test 03/16/17  0820   HGB 13.4               Failed - Normal Platelets on file in past 12 months     Recent Labs   Lab Test 03/16/17  0820                  Failed - Recent (12 mo) or future (30 days) visit within the authorizing provider's specialty     Patient had office visit in the last 12 months or has a visit in the next 30 days with authorizing provider or within the authorizing provider's specialty.  See \"Patient Info\" tab in inbasket, or \"Choose Columns\" in Meds & Orders section of the refill encounter.              Passed - Medication is active on med list        Passed - Patient is age 18 or older        Clopidogrel    Last Written Prescription Date:  01/02/2019  Last Fill Quantity: 90,  # refills: 3   Last office visit: 1/23/2019 with prescribing provider:  Dr. Irizarry   Future Office Visit:    "

## 2019-04-17 ENCOUNTER — OFFICE VISIT (OUTPATIENT)
Dept: FAMILY MEDICINE | Facility: CLINIC | Age: 54
End: 2019-04-17
Payer: COMMERCIAL

## 2019-04-17 VITALS
SYSTOLIC BLOOD PRESSURE: 124 MMHG | HEART RATE: 73 BPM | WEIGHT: 284 LBS | BODY MASS INDEX: 40.66 KG/M2 | DIASTOLIC BLOOD PRESSURE: 81 MMHG | RESPIRATION RATE: 16 BRPM | TEMPERATURE: 97.6 F | HEIGHT: 70 IN | OXYGEN SATURATION: 95 %

## 2019-04-17 DIAGNOSIS — L01.00 IMPETIGO: ICD-10-CM

## 2019-04-17 DIAGNOSIS — E66.01 MORBID OBESITY (H): ICD-10-CM

## 2019-04-17 DIAGNOSIS — M10.9 ACUTE GOUT OF RIGHT ANKLE, UNSPECIFIED CAUSE: Primary | ICD-10-CM

## 2019-04-17 DIAGNOSIS — I10 HYPERTENSION GOAL BP (BLOOD PRESSURE) < 130/80: ICD-10-CM

## 2019-04-17 DIAGNOSIS — I63.10 CEREBROVASCULAR ACCIDENT (CVA) DUE TO EMBOLISM OF PRECEREBRAL ARTERY (H): ICD-10-CM

## 2019-04-17 DIAGNOSIS — M10.9 ACUTE GOUT OF RIGHT ANKLE, UNSPECIFIED CAUSE: ICD-10-CM

## 2019-04-17 DIAGNOSIS — R73.09 ELEVATED GLUCOSE: ICD-10-CM

## 2019-04-17 LAB
ALBUMIN SERPL-MCNC: 3.9 G/DL (ref 3.4–5)
ALP SERPL-CCNC: 70 U/L (ref 40–150)
ALT SERPL W P-5'-P-CCNC: 36 U/L (ref 0–70)
ANION GAP SERPL CALCULATED.3IONS-SCNC: 4 MMOL/L (ref 3–14)
AST SERPL W P-5'-P-CCNC: 15 U/L (ref 0–45)
BILIRUB SERPL-MCNC: 0.5 MG/DL (ref 0.2–1.3)
BUN SERPL-MCNC: 14 MG/DL (ref 7–30)
CALCIUM SERPL-MCNC: 9.3 MG/DL (ref 8.5–10.1)
CHLORIDE SERPL-SCNC: 106 MMOL/L (ref 94–109)
CO2 SERPL-SCNC: 27 MMOL/L (ref 20–32)
CREAT SERPL-MCNC: 0.82 MG/DL (ref 0.66–1.25)
ERYTHROCYTE [DISTWIDTH] IN BLOOD BY AUTOMATED COUNT: 12.5 % (ref 10–15)
GFR SERPL CREATININE-BSD FRML MDRD: >90 ML/MIN/{1.73_M2}
GLUCOSE SERPL-MCNC: 170 MG/DL (ref 70–99)
HBA1C MFR BLD: 6.6 % (ref 0–5.6)
HCT VFR BLD AUTO: 38.4 % (ref 40–53)
HGB BLD-MCNC: 13.3 G/DL (ref 13.3–17.7)
MCH RBC QN AUTO: 29.9 PG (ref 26.5–33)
MCHC RBC AUTO-ENTMCNC: 34.6 G/DL (ref 31.5–36.5)
MCV RBC AUTO: 86 FL (ref 78–100)
PLATELET # BLD AUTO: 209 10E9/L (ref 150–450)
POTASSIUM SERPL-SCNC: 4.4 MMOL/L (ref 3.4–5.3)
PROT SERPL-MCNC: 7.1 G/DL (ref 6.8–8.8)
RBC # BLD AUTO: 4.45 10E12/L (ref 4.4–5.9)
SODIUM SERPL-SCNC: 137 MMOL/L (ref 133–144)
URATE SERPL-MCNC: 5.5 MG/DL (ref 3.5–7.2)
WBC # BLD AUTO: 5.4 10E9/L (ref 4–11)

## 2019-04-17 PROCEDURE — 85027 COMPLETE CBC AUTOMATED: CPT | Performed by: FAMILY MEDICINE

## 2019-04-17 PROCEDURE — 80053 COMPREHEN METABOLIC PANEL: CPT | Performed by: FAMILY MEDICINE

## 2019-04-17 PROCEDURE — 83036 HEMOGLOBIN GLYCOSYLATED A1C: CPT | Performed by: FAMILY MEDICINE

## 2019-04-17 PROCEDURE — 84550 ASSAY OF BLOOD/URIC ACID: CPT | Performed by: FAMILY MEDICINE

## 2019-04-17 PROCEDURE — 36415 COLL VENOUS BLD VENIPUNCTURE: CPT | Performed by: FAMILY MEDICINE

## 2019-04-17 PROCEDURE — 99214 OFFICE O/P EST MOD 30 MIN: CPT | Performed by: FAMILY MEDICINE

## 2019-04-17 RX ORDER — INDOMETHACIN 50 MG/1
50 CAPSULE ORAL
Qty: 30 CAPSULE | Refills: 5 | Status: CANCELLED | OUTPATIENT
Start: 2019-04-17

## 2019-04-17 RX ORDER — MUPIROCIN 20 MG/G
OINTMENT TOPICAL 3 TIMES DAILY
Qty: 22 G | Refills: 0 | Status: SHIPPED | OUTPATIENT
Start: 2019-04-17

## 2019-04-17 RX ORDER — DOXYCYCLINE HYCLATE 100 MG
100 TABLET ORAL 2 TIMES DAILY
Qty: 20 TABLET | Refills: 0 | Status: SHIPPED | OUTPATIENT
Start: 2019-04-17 | End: 2019-07-17

## 2019-04-17 RX ORDER — CLOPIDOGREL BISULFATE 75 MG/1
75 TABLET ORAL DAILY
Qty: 90 TABLET | Refills: 3 | Status: SHIPPED | OUTPATIENT
Start: 2019-04-17 | End: 2020-01-17

## 2019-04-17 RX ORDER — INDOMETHACIN 50 MG/1
50 CAPSULE ORAL
Qty: 30 CAPSULE | Refills: 5 | Status: SHIPPED | OUTPATIENT
Start: 2019-04-17 | End: 2020-06-02

## 2019-04-17 ASSESSMENT — PAIN SCALES - GENERAL: PAINLEVEL: MODERATE PAIN (4)

## 2019-04-17 ASSESSMENT — MIFFLIN-ST. JEOR: SCORE: 2149.47

## 2019-04-17 NOTE — PROGRESS NOTES
SUBJECTIVE:   Lyle Tenorio is a 53 year old male who presents to clinic today for the following   health issues:  Chief Complaint   Patient presents with     Arthritis     refill med     Derm Problem        Gout/ single inflamed joint   Onset: x 1 weeks    Description:   Location: ankle and heel - right  Joint Swelling: YES - not today  Redness: YES  Pain: YES    Intensity: severe    Progression of Symptoms:  worsening    Accompanying Signs & Symptoms:  Fevers: no     History:   Trauma to the area: no   Previous history of gout: YES   Recent illness:  no     Precipitating factors:   Diet-rich in purine: trying to cut down on red meats  Alcohol use: YES - occasionally  Diuretic use: no     Alleviating factors:  none    Therapies Tried and outcome: none    He has a history of gout.   His first episode was in January.    He has had flares in right ankle.  'He has had 6 episodes since January in the same spot.  No previous history of gout.   He has erythema, swelling, pain.  Often starts during the night.  Makes it painful to walk.   He has been taking indomethacin 50mg three times daily as needed. Works well.  Usually takes three times daily for about a week.   Was on prednisone for the first episode.   Last flare was last week.   He takes allopurinol 100mg daily.   He had ankle fracture in the past.    Problem 2:   Derm problem: breakouts behind ear and forehead  He has had issues with bumps behind ears and forehead.   He has had off and on for months.     Patient Active Problem List   Diagnosis     Advanced care planning/counseling discussion     Cerebral embolism with cerebral infarction (H)     Hyperlipidemia LDL goal <100     Paresthesia     Hypertension goal BP (blood pressure) < 130/80     CVA (cerebral vascular accident) (H)     MADDY (obstructive sleep apnea)     Major depression in complete remission (H)     Generalized anxiety disorder     Morbid obesity (H)    Exercise:not in winter.     Alcohol: Drinks  "beer on weekends.  Has not been drinking beer recently.     ROS:General: POSITIVE for:, weight loss, 7# since January.   Resp: No coughing, wheezing or shortness of breath  CV: No chest pains or palpitations  GI: No nausea, vomiting,  heartburn, abdominal pain, diarrhea, constipation or change in bowel habits  : No urinary frequency or dysuria, bladder or kidney problems  Musculoskeletal: POSITIVE  for:, pain right ankle improving.   SKIN: handful of skin sores left forehead and behind left ear.      OBJECTIVE:Blood pressure 124/81, pulse 73, temperature 97.6  F (36.4  C), temperature source Tympanic, resp. rate 16, height 1.778 m (5' 10\"), weight 128.8 kg (284 lb), SpO2 95 %. BMI=Body mass index is 40.75 kg/m .  GENERAL APPEARANCE ADULT: Alert, no acute distress, morbidly obese  MS: Mild swelling lateral right ankle, no erythema or tenderness right ankle.       ASSESSMENT:   (M10.9) Acute gout of right ankle, unspecified cause  (primary encounter diagnosis)  Comment: frequent flares of gout.   Plan: indomethacin (INDOCIN) 50 MG capsule,         Comprehensive metabolic panel (BMP + Alb, Alk         Phos, ALT, AST, Total. Bili, TP), CBC with         platelets, Uric acid        Check uric acid.  Goal is level below 5-6  Check liver, kidneys and blood counts for side effects of allopurinol.   I will increase allopurinol if needed to control gout flares.   If you continue to have gout flares, we could add colchicine daily for gout prevention while allopurinol gets up to speed.   Limit alcohol.    Limit large red meat portions.     (I10) Hypertension goal BP (blood pressure) < 130/80  Comment: doing OK  Plan: No change in current treatment plan.     (E66.01) Morbid obesity (H)  Comment: obesity can contribute to gout.   Plan: Keep working on weight loss.     (I63.10) Cerebrovascular accident (CVA) due to embolism of precerebral artery (H)  Comment:   Plan: clopidogrel (PLAVIX) 75 MG tablet        Refills.     (L01.00) " Impetigo  Comment: skin sores.   Plan: doxycycline hyclate (VIBRA-TABS) 100 MG tablet,        mupirocin (BACTROBAN) 2 % external ointment        Take the doxycycline 100mg twice daily for 10 days.   Try antibiotic ointment three times daily for 10 days for the sores.

## 2019-04-17 NOTE — RESULT ENCOUNTER NOTE
Please call to make sure he gets the message.   Needs referral for diabetic educator.    Leroy Alvarenga,  Your uric acid gout test has improved a lot.    Your glucose is high as is the Hgb A1C.  Both are tests that suggest you have diabetes mellitus.   Other chemistries OK including liver and kidney tests.   Your blood counts including the white blood cells, red blood cells and platelets are all normal.    PLAN: I suggest increasing allopurinol to 200mg daily (two of the 100mg pills daily)  If you continue to have gout attacks, contact me and I will recommend adding colchicine once daily with the allopurinol to prevent gout at least for the next few months.     For the diabetes mellitus, most important are Weight loss, regular exercise with goal of 30 minutes most days of the week and eating a prudent diet (lots of fruits and vegetables, limiting unhealthy fats and excessive calories).  I recommend setting up appointment with our diabetic educator.  This is to learn more about diabetes mellitus and to obtain the equipment to help you check blood sugars at home starting with once a day.  May recommend medicines to control her diabetes mellitus.  We can wait on that until you see the diabetic educator and see what blood sugar numbers you get checking at home.  I will have my clinic nurse call you to help you set up for the above.  Please see me a few weeks after you start checking blood sugars.    SAVI RAUSCH MD

## 2019-04-18 DIAGNOSIS — E11.9 DIABETES MELLITUS (H): Primary | ICD-10-CM

## 2019-05-07 DIAGNOSIS — E78.5 HYPERLIPIDEMIA LDL GOAL <100: ICD-10-CM

## 2019-05-07 RX ORDER — ATORVASTATIN CALCIUM 20 MG/1
20 TABLET, FILM COATED ORAL DAILY
Qty: 90 TABLET | Refills: 0 | Status: SHIPPED | OUTPATIENT
Start: 2019-05-07 | End: 2019-07-17

## 2019-05-07 NOTE — TELEPHONE ENCOUNTER
"Requested Prescriptions   Pending Prescriptions Disp Refills     atorvastatin (LIPITOR) 20 MG tablet 90 tablet 3     Sig: Take 1 tablet (20 mg) by mouth daily       Statins Protocol Failed - 5/7/2019  2:07 PM        Failed - LDL on file in past 12 months     Recent Labs   Lab Test 03/26/18  0848   LDL 52             Passed - No abnormal creatine kinase in past 12 months     No lab results found.             Passed - Recent (12 mo) or future (30 days) visit within the authorizing provider's specialty     Patient had office visit in the last 12 months or has a visit in the next 30 days with authorizing provider or within the authorizing provider's specialty.  See \"Patient Info\" tab in inbasket, or \"Choose Columns\" in Meds & Orders section of the refill encounter.              Passed - Medication is active on med list        Passed - Patient is age 18 or older        Last Written Prescription Date:  03/26/18  Last Fill Quantity: 90,  # refills: 3   Last office visit: 4/17/2019 with prescribing provider:  04/17/19   Future Office Visit:      "

## 2019-05-15 ENCOUNTER — ALLIED HEALTH/NURSE VISIT (OUTPATIENT)
Dept: EDUCATION SERVICES | Facility: CLINIC | Age: 54
End: 2019-05-15
Payer: COMMERCIAL

## 2019-05-15 VITALS — BODY MASS INDEX: 39.8 KG/M2 | WEIGHT: 277.4 LBS

## 2019-05-15 DIAGNOSIS — E11.9 DIABETES MELLITUS, TYPE 2 (H): Primary | ICD-10-CM

## 2019-05-15 PROCEDURE — G0108 DIAB MANAGE TRN  PER INDIV: HCPCS | Performed by: DIETITIAN, REGISTERED

## 2019-05-15 RX ORDER — LANCETS
EACH MISCELLANEOUS
Qty: 102 EACH | Refills: 11 | Status: SHIPPED | OUTPATIENT
Start: 2019-05-15

## 2019-05-15 NOTE — PATIENT INSTRUCTIONS
1.  Watch your carbohydrate choices: 3-4 choices at meals, 1-2 at snacks.    2.  Stay as active as able, this will help with your blood sugars    3.  Test blood sugars once daily.   mg/dL goal, ideal if you can get numbers under 110.    4.  Record food and carbs on log sheet

## 2019-05-16 NOTE — PROGRESS NOTES
"Diabetes Self-Management Education & Support    Diabetes Education Self Management & Training    SUBJECTIVE/OBJECTIVE:  Presents for: Initial Assessment for new diagnosis  Accompanied by: Self  Diabetes education in the past 24mo: (P) No  Focus of Visit: Being Active, Healthy Eating, Healthy Coping, Monitoring, Diabetes Pathophysiology  Diabetes type: Type 2  How confident are you filling out medical forms by yourself:: Not Assessed  Transportation concerns: No  Other concerns:: None  Cultural Influences/Ethnic Background:  American      Diabetes Symptoms & Complications  Blurred vision: (P) No  Fatigue: (P) No  Foot ulcerations: (P) No  Polydipsia: (P) No  Polyphagia: (P) No  Polyuria: (P) No  Visual change: (P) No  Weakness: (P) No  Weight loss: (P) No  Slow healing wounds: (P) No  Weight trend: (P) Fluctuating minimally  Autonomic neuropathy: (P) No  CVA: (P) No  Heart disease: (P) No  Nephropathy: (P) No  Peripheral neuropathy: (P) No  Peripheral Vascular Disease: (P) No  Retinopathy: (P) No  Sexual dysfunction: (P) No    Patient Problem List and Family Medical History reviewed for relevant medical history, current medical status, and diabetes risk factors.    Vitals:  Wt 125.8 kg (277 lb 6.4 oz)   BMI 39.80 kg/m    Estimated body mass index is 39.8 kg/m  as calculated from the following:    Height as of 4/17/19: 1.778 m (5' 10\").    Weight as of this encounter: 125.8 kg (277 lb 6.4 oz).   Last 3 BP:   BP Readings from Last 3 Encounters:   04/17/19 124/81   01/23/19 122/82   01/15/19 122/84       History   Smoking Status     Former Smoker     Packs/day: 1.00     Years: 25.00     Types: Cigarettes     Quit date: 3/28/2010   Smokeless Tobacco     Never Used       Labs:  Lab Results   Component Value Date    A1C 6.6 04/17/2019     Lab Results   Component Value Date     04/17/2019     Lab Results   Component Value Date    LDL 52 03/26/2018     HDL Cholesterol   Date Value Ref Range Status   03/26/2018 53 >39 " mg/dL Final   ]  GFR Estimate   Date Value Ref Range Status   04/17/2019 >90 >60 mL/min/[1.73_m2] Final     Comment:     Non  GFR Calc  Starting 12/18/2018, serum creatinine based estimated GFR (eGFR) will be   calculated using the Chronic Kidney Disease Epidemiology Collaboration   (CKD-EPI) equation.       GFR Estimate If Black   Date Value Ref Range Status   04/17/2019 >90 >60 mL/min/[1.73_m2] Final     Comment:      GFR Calc  Starting 12/18/2018, serum creatinine based estimated GFR (eGFR) will be   calculated using the Chronic Kidney Disease Epidemiology Collaboration   (CKD-EPI) equation.       Lab Results   Component Value Date    CR 0.82 04/17/2019     No results found for: MICROALBUMIN    Healthy Eating  Healthy Eating Assessed Today: Yes  Cultural/Yazdanism diet restrictions?: No  Meal planning: (P) None  Meals include: (P) Dinner  Breakfast: toast with butter (2), coffee with sf creamer  9-11am  Lunch: (P) 4 pm: salami and cheese, crackers (6), corn chips and lloyd  Dinner: (P) steak and corn on cob (2), twist up diet, cranberry and pineapple or chicken burrito bowl, diet pop or mushroom and swiss, fries, water  Snacks: (P) cheetos, fritos, hazelnut wafer bar, cheese and crackers  Beverages: (P) Coffee, Diet soda, Alcohol(has cut back due to gout, )  Has patient met with a dietitian in the past?: (P) No    Being Active  Being Active Assessed Today: (P) No(has been dealing with gout)  Exercise:: (P) (plans to do more outside as able with the gout)  Barrier to exercise: (P) Time    Monitoring  Monitoring Assessed Today: (P) Yes  Did patient bring glucose meter to appointment? : (P) No  Set pt up with monitor to test blood sugars    Taking Medications           Problem Solving  Medical alert: (P) No              Reducing Risks  Has dilated eye exam at least once a year?: (P) Yes  Sees dentist every 6 months?: (P) Yes    Healthy Coping  Informal Support system:: (P) Family,  Friends, Significant other  Patient Activation Measure Survey Score:  PRINCE Score (Last Two) 4/19/2012   PRINCE Raw Score 39   Activation Score 56.4   PRINCE Level 3       ASSESSMENT:  Ordered accu-chek guide strips, new to diabetes, still trying to accept new dx, angry.  Went over pathophysiology and diet, activity.        Patient's most recent   Lab Results   Component Value Date    A1C 6.6 04/17/2019    is meeting goal of <7.0    INTERVENTION:   Diabetes knowledge and skills assessment:     Patient is knowledgeable in diabetes management concepts related to: Healthy Eating, Being Active and Monitoring    Patient needs further education on the following diabetes management concepts: Healthy Eating, Being Active, Monitoring, Taking Medication, Problem Solving, Reducing Risks and Healthy Coping    Based on learning assessment above, most appropriate setting for further diabetes education would be: Individual setting.    Education provided today on:  AADE Self-Care Behaviors:  Diabetes Pathophysiology  Healthy Eating: carbohydrate counting, consistency in amount, composition, and timing of food intake and label reading  Being Active: relationship to blood glucose and describe appropriate activity program  Monitoring: purpose, proper technique, log and interpret results, individual blood glucose targets and frequency of monitoring    Opportunities for ongoing education and support in diabetes-self management were discussed.    Pt verbalized understanding of concepts discussed and recommendations provided today.       Education Materials Provided:  Arti Taking Charge of Your Diabetes Book, BG Log Sheet, Carbohydrate Counting and Accu-Chek Guide meter kit    PLAN:  See Patient Instructions for co-developed, patient-stated behavior change goals.  AVS printed and provided to patient today. See Follow-Up section for recommended follow-up.      Time Spent: 60 minutes  Encounter Type: Individual    Any diabetes medication dose  changes were made via the CDE Protocol and Collaborative Practice Agreement with the patient's primary care provider. A copy of this encounter was shared with the provider.

## 2019-05-22 DIAGNOSIS — G47.33 OBSTRUCTIVE SLEEP APNEA: Primary | ICD-10-CM

## 2019-06-07 DIAGNOSIS — I63.429 CEREBRAL INFARCTION DUE TO EMBOLISM OF ANTERIOR CEREBRAL ARTERY, UNSPECIFIED BLOOD VESSEL LATERALITY (H): ICD-10-CM

## 2019-06-07 RX ORDER — LISINOPRIL 2.5 MG/1
2.5 TABLET ORAL DAILY
Qty: 90 TABLET | Refills: 1 | Status: SHIPPED | OUTPATIENT
Start: 2019-06-07 | End: 2019-12-27

## 2019-07-02 ENCOUNTER — ALLIED HEALTH/NURSE VISIT (OUTPATIENT)
Dept: EDUCATION SERVICES | Facility: CLINIC | Age: 54
End: 2019-07-02
Payer: COMMERCIAL

## 2019-07-02 DIAGNOSIS — E11.9 DIABETES MELLITUS TYPE 2, CONTROLLED (H): Primary | ICD-10-CM

## 2019-07-02 PROCEDURE — G0108 DIAB MANAGE TRN  PER INDIV: HCPCS | Performed by: DIETITIAN, REGISTERED

## 2019-07-02 NOTE — PATIENT INSTRUCTIONS
1.  Look at your feet (wear shoes, moisturize, dump out shoes), microalbumin done yearly (urine test to check your kidneys), A1C goal is under 7%, blood pressure should be under 140/90, dentist twice yearly, eye exam yearly.    2.  Get an appt with Dr. Irizarry for recheck, still needs some test done.

## 2019-07-02 NOTE — PROGRESS NOTES
"Diabetes Self-Management Education & Support    Diabetes Education Self Management & Training    SUBJECTIVE/OBJECTIVE:  Presents for: Initial Assessment for new diagnosis  Accompanied by: Self  Diabetes education in the past 24mo: No  Focus of Visit: Being Active, Healthy Eating, Healthy Coping, Monitoring, Diabetes Pathophysiology  Diabetes type: Type 2  How confident are you filling out medical forms by yourself:: Not Assessed  Transportation concerns: No  Other concerns:: None  Cultural Influences/Ethnic Background:  American      Diabetes Symptoms & Complications  Blurred vision: No  Fatigue: No  Foot ulcerations: No  Polydipsia: No  Polyphagia: No  Polyuria: No  Visual change: No  Weakness: No  Weight loss: No  Slow healing wounds: No  Weight trend: Fluctuating minimally  Autonomic neuropathy: No  CVA: No  Heart disease: No  Nephropathy: No  Peripheral neuropathy: No  Peripheral Vascular Disease: No  Retinopathy: No  Sexual dysfunction: No    Patient Problem List and Family Medical History reviewed for relevant medical history, current medical status, and diabetes risk factors.    Vitals:  There were no vitals taken for this visit.  Estimated body mass index is 39.8 kg/m  as calculated from the following:    Height as of 4/17/19: 1.778 m (5' 10\").    Weight as of 5/15/19: 125.8 kg (277 lb 6.4 oz).   Last 3 BP:   BP Readings from Last 3 Encounters:   04/17/19 124/81   01/23/19 122/82   01/15/19 122/84       History   Smoking Status     Former Smoker     Packs/day: 1.00     Years: 25.00     Types: Cigarettes     Quit date: 3/28/2010   Smokeless Tobacco     Never Used       Labs:  Lab Results   Component Value Date    A1C 6.6 04/17/2019     Lab Results   Component Value Date     04/17/2019     Lab Results   Component Value Date    LDL 52 03/26/2018     HDL Cholesterol   Date Value Ref Range Status   03/26/2018 53 >39 mg/dL Final   ]  GFR Estimate   Date Value Ref Range Status   04/17/2019 >90 >60 " mL/min/[1.73_m2] Final     Comment:     Non  GFR Calc  Starting 12/18/2018, serum creatinine based estimated GFR (eGFR) will be   calculated using the Chronic Kidney Disease Epidemiology Collaboration   (CKD-EPI) equation.       GFR Estimate If Black   Date Value Ref Range Status   04/17/2019 >90 >60 mL/min/[1.73_m2] Final     Comment:      GFR Calc  Starting 12/18/2018, serum creatinine based estimated GFR (eGFR) will be   calculated using the Chronic Kidney Disease Epidemiology Collaboration   (CKD-EPI) equation.       Lab Results   Component Value Date    CR 0.82 04/17/2019     No results found for: MICROALBUMIN    Healthy Eating  Healthy Eating Assessed Today: Yes  Cultural/Sabianist diet restrictions?: No  Meal planning: None  Meals include: Dinner  Breakfast: toast with butter (2), coffee with sf creamer  9-11am  Lunch: 4 pm: salami and cheese, crackers (6), corn chips and lloyd  Dinner: steak and corn on cob (2), twist up diet, cranberry and pineapple or chicken burrito bowl, diet pop or mushroom and swiss, fries, water  Snacks: cheetos, fritos, hazelnut wafer bar, cheese and crackers  Beverages: Coffee, Diet soda, Alcohol(has cut back due to gout, )  Has patient met with a dietitian in the past?: No    Being Active  Being Active Assessed Today: No(has been dealing with gout)  Exercise:: (plans to do more outside as able with the gout)  Barrier to exercise: Time    Monitoring  Monitoring Assessed Today: Yes  Did patient bring glucose meter to appointment? : No        Taking Medications           Problem Solving  Medical alert: No              Reducing Risks  Has dilated eye exam at least once a year?: Yes  Sees dentist every 6 months?: Yes    Healthy Coping  Informal Support system:: Family, Friends, Significant other  Patient Activation Measure Survey Score:  PRINCE Score (Last Two) 4/19/2012   PRINCE Raw Score 39   Activation Score 56.4   PRINCE Level 3       ASSESSMENT:  Doing well  with meal plan.  Has made good changes.  Went over complications of diabetes today.        Patient's most recent   Lab Results   Component Value Date    A1C 6.6 04/17/2019    is meeting goal of <7.0    INTERVENTION:   Diabetes knowledge and skills assessment:     Patient is knowledgeable in diabetes management concepts related to: Healthy Eating, Being Active and Monitoring    Patient needs further education on the following diabetes management concepts: Healthy Eating, Being Active, Monitoring, Taking Medication, Problem Solving, Reducing Risks and Healthy Coping    Based on learning assessment above, most appropriate setting for further diabetes education would be: Individual setting.    Education provided today on:  AADE Self-Care Behaviors:  Healthy Eating: carbohydrate counting and consistency in amount, composition, and timing of food intake  Being Active: relationship to blood glucose and describe appropriate activity program  Reducing Risks: major complications of diabetes, foot care, appropriate dental care, annual eye exam, aspirin therapy, A1C - goals, relating to blood glucose levels, how often to check, lipids levels and goals and blood pressure and goals    Opportunities for ongoing education and support in diabetes-self management were discussed.    Pt verbalized understanding of concepts discussed and recommendations provided today.       Education Materials Provided:  BG Log Sheet and Carbohydrate Counting    PLAN:  See Patient Instructions for co-developed, patient-stated behavior change goals.  AVS printed and provided to patient today. See Follow-Up section for recommended follow-up.      Time Spent: 60 minutes  Encounter Type: Individual    Any diabetes medication dose changes were made via the CDE Protocol and Collaborative Practice Agreement with the patient's primary care provider. A copy of this encounter was shared with the provider.

## 2019-07-17 ENCOUNTER — OFFICE VISIT (OUTPATIENT)
Dept: FAMILY MEDICINE | Facility: CLINIC | Age: 54
End: 2019-07-17
Payer: COMMERCIAL

## 2019-07-17 VITALS
HEART RATE: 74 BPM | HEIGHT: 70 IN | SYSTOLIC BLOOD PRESSURE: 124 MMHG | TEMPERATURE: 97.4 F | DIASTOLIC BLOOD PRESSURE: 78 MMHG | RESPIRATION RATE: 16 BRPM | WEIGHT: 274 LBS | BODY MASS INDEX: 39.22 KG/M2

## 2019-07-17 DIAGNOSIS — E66.01 MORBID OBESITY (H): ICD-10-CM

## 2019-07-17 DIAGNOSIS — I10 HYPERTENSION GOAL BP (BLOOD PRESSURE) < 130/80: ICD-10-CM

## 2019-07-17 DIAGNOSIS — R39.9 URINARY SYMPTOM OR SIGN: ICD-10-CM

## 2019-07-17 DIAGNOSIS — M1A.0710 CHRONIC GOUT OF RIGHT ANKLE, UNSPECIFIED CAUSE: ICD-10-CM

## 2019-07-17 DIAGNOSIS — E11.9 TYPE 2 DIABETES MELLITUS WITHOUT COMPLICATION, WITHOUT LONG-TERM CURRENT USE OF INSULIN (H): Primary | ICD-10-CM

## 2019-07-17 DIAGNOSIS — E78.5 HYPERLIPIDEMIA LDL GOAL <100: ICD-10-CM

## 2019-07-17 LAB
ALBUMIN UR-MCNC: NEGATIVE MG/DL
APPEARANCE UR: CLEAR
BACTERIA #/AREA URNS HPF: ABNORMAL /HPF
BILIRUB UR QL STRIP: NEGATIVE
CHOLEST SERPL-MCNC: 136 MG/DL
COLOR UR AUTO: YELLOW
CREAT UR-MCNC: 215 MG/DL
GLUCOSE UR STRIP-MCNC: NEGATIVE MG/DL
HBA1C MFR BLD: 6 % (ref 0–5.6)
HDLC SERPL-MCNC: 57 MG/DL
HGB UR QL STRIP: ABNORMAL
KETONES UR STRIP-MCNC: NEGATIVE MG/DL
LDLC SERPL CALC-MCNC: 42 MG/DL
LEUKOCYTE ESTERASE UR QL STRIP: NEGATIVE
MICROALBUMIN UR-MCNC: 63 MG/L
MICROALBUMIN/CREAT UR: 29.4 MG/G CR (ref 0–17)
NITRATE UR QL: NEGATIVE
NON-SQ EPI CELLS #/AREA URNS LPF: ABNORMAL /LPF
NONHDLC SERPL-MCNC: 79 MG/DL
PH UR STRIP: 5 PH (ref 5–7)
RBC #/AREA URNS AUTO: ABNORMAL /HPF
SOURCE: ABNORMAL
SP GR UR STRIP: >1.03 (ref 1–1.03)
TRIGL SERPL-MCNC: 185 MG/DL
TSH SERPL DL<=0.005 MIU/L-ACNC: 1.92 MU/L (ref 0.4–4)
URATE SERPL-MCNC: 5.6 MG/DL (ref 3.5–7.2)
UROBILINOGEN UR STRIP-ACNC: 0.2 EU/DL (ref 0.2–1)
WBC #/AREA URNS AUTO: ABNORMAL /HPF

## 2019-07-17 PROCEDURE — 84550 ASSAY OF BLOOD/URIC ACID: CPT | Performed by: FAMILY MEDICINE

## 2019-07-17 PROCEDURE — 99207 C FOOT EXAM  NO CHARGE: CPT | Performed by: FAMILY MEDICINE

## 2019-07-17 PROCEDURE — 99214 OFFICE O/P EST MOD 30 MIN: CPT | Performed by: FAMILY MEDICINE

## 2019-07-17 PROCEDURE — 83036 HEMOGLOBIN GLYCOSYLATED A1C: CPT | Performed by: FAMILY MEDICINE

## 2019-07-17 PROCEDURE — 81001 URINALYSIS AUTO W/SCOPE: CPT | Performed by: FAMILY MEDICINE

## 2019-07-17 PROCEDURE — 82043 UR ALBUMIN QUANTITATIVE: CPT | Performed by: FAMILY MEDICINE

## 2019-07-17 PROCEDURE — 80061 LIPID PANEL: CPT | Performed by: FAMILY MEDICINE

## 2019-07-17 PROCEDURE — 84443 ASSAY THYROID STIM HORMONE: CPT | Performed by: FAMILY MEDICINE

## 2019-07-17 PROCEDURE — 36415 COLL VENOUS BLD VENIPUNCTURE: CPT | Performed by: FAMILY MEDICINE

## 2019-07-17 RX ORDER — ATORVASTATIN CALCIUM 20 MG/1
20 TABLET, FILM COATED ORAL DAILY
Qty: 90 TABLET | Refills: 3 | Status: SHIPPED | OUTPATIENT
Start: 2019-07-17 | End: 2020-08-25

## 2019-07-17 ASSESSMENT — MIFFLIN-ST. JEOR: SCORE: 2099.11

## 2019-07-17 NOTE — PATIENT INSTRUCTIONS
ASSESSMENT/PLAN:                                                    Lifestyle recommendations:regular exercise, at least 150 minutes per week (average 30 minutes 5 times a week)  good job on losing weight!  keep working on losing weight (ideal BMI-body mass index is <25)  Diabetic recommendations:-eye exam is needed yearly  check feet on a daily basis  return for follow-up visit in 6 month(s)    (E11.9) Type 2 diabetes mellitus without complication, without long-term current use of insulin (H)  (primary encounter diagnosis)  Comment: doing much better  Plan: TSH with free T4 reflex, Hemoglobin A1c,         Albumin Random Urine Quantitative with Creat         Ratio, C FOOT EXAM  NO CHARGE        Check blood sugars at various times; in the morning when fasting, before meals and at bedtime.  You can also check 1-2 hours after a meal.  It is helpful to check at different times of the day to see how blood sugars are doing as a brief snapshot.    Keep a record of the readings to see if there are consistent highs or lows at certain times of the day.  This will help in adjusting medication.   Start by checking 1 time(s) a day.    A normal fasting glucometer is under 100.   Our goal is to have fasting and before meal glucometer readings in the range of  and 1-2 hours after a meal of less than 180.  This will help in keeping Hgb A1C under 7.  Having diabetes mellitus well controlled can help prevent future diabetes complications like vision problems leading to blindness, kidney problems and damage to nerves in legs and feet.      (E66.01) Morbid obesity (H)  Comment: good weight loss in the past 3 months    (E78.5) Hyperlipidemia LDL goal <100  Comment:   Plan: Lipid panel reflex to direct LDL Fasting,         atorvastatin (LIPITOR) 20 MG tablet        Checking today.  Refills.      (R39.9) Urinary symptom or sign  Comment: possible bladder infection  Plan: *UA reflex to Microscopic and Culture ([facility]         and  [facility] ([facility] and         [facility])          (I10) Hypertension goal BP (blood pressure) < 130/80  Comment: doing well  Plan: No change in current treatment plan.     (M1A.0710) Chronic gout of right ankle, unspecified cause  Comment: has been OK  Plan: Uric acid        Recheck uric acid gout test.  Goal is under 5-6 to prevent gout.

## 2019-07-17 NOTE — NURSING NOTE
"Chief Complaint   Patient presents with     Diabetes     Urinary Problem     Increase sensation to urinate, both day and night. Normal flow and feels as if he can empty his bladder, but needs to go again.       Initial /78   Pulse 74   Temp 97.4  F (36.3  C) (Tympanic)   Resp 16   Ht 1.778 m (5' 10\")   Wt 124.3 kg (274 lb)   BMI 39.31 kg/m   Estimated body mass index is 39.31 kg/m  as calculated from the following:    Height as of this encounter: 1.778 m (5' 10\").    Weight as of this encounter: 124.3 kg (274 lb).    Patient presents to the clinic using     Health Maintenance that is potentially due pending provider review:          Is there anyone who you would like to be able to receive your results?   If yes have patient fill out MIRNA    "

## 2019-07-17 NOTE — RESULT ENCOUNTER NOTE
Leroy Alvarenga,  Urine tests show positive MAC (microalbumin/creatinine ratio) meaning protein leaking from the kidneys into the urine. This may be a sign of some kidney damage from the diabetes mellitus.  I like to see another one abnormal before saying for sure you have kidney disease.   Urine tests were OK.  Scant blood cells, no pus cells in urine.  No sign of infection.   TSH is normal indicating that the level of thyroid hormone is in the normal range.   Triglycerides, a type of fat found in the bloodstream is high.  Other lipids OK.   Uric acid gout test is at an OK level.  If you continue to have any gout symptoms, we can increase the allopurinol to 200mg daily.   HgbA1C is OK indicating reasonable diabetic control as we discussed.   PLAN: No new changes in treatment recommended..    Recheck urine test for protein in 6 months when you return.   SAVI RAUSCH MD

## 2019-07-17 NOTE — PROGRESS NOTES
SUBJECTIVE: Lyle Tenorio is a 54 year old male  Chief Complaint   Patient presents with     Diabetes     Urinary Problem     Increase sensation to urinate, both day and night. Normal flow and feels as if he can empty his bladder, but needs to go again.      Infrequent gout flare.  Taking indomethacin one pill and symptoms resolve.  Taking allopurinol 100mg daily.     Some urinary symptoms after urination like he needs to urinate.   Onset of symptoms: past 2 weeks.  Nocturia-no.  Some daytime frequency.  No dysuria.     Weight down 10#.  He feels he has been eating better and more active.   He has seen diabetic educator.     On CPAP nightly.      Diabetes Follow-up      How often are you checking your blood sugar? Two times daily.  Did not bring log.  Did bring in meter. 30 day ofi=761.  Glucometer range within the past  Month=    Not currently taking medication for diabetes mellitus.     What time of day are you checking your blood sugars (select all that apply)?  Before meals and After meals    Have you had any blood sugars above 200?  No    Have you had any blood sugars below 70?  No    What symptoms do you notice when your blood sugar is low?  None    What concerns do you have today about your diabetes? None     Do you have any of these symptoms? (Select all that apply)  No numbness or tingling in feet.  No redness, sores or blisters on feet.  No complaints of excessive thirst.  No reports of blurry vision.  No significant changes to weight.     Have you had a diabetic eye exam in the last 12 months? Yes- Date of last eye exam: 01/04/2019    111 average blood sugar  Diabetes Management Resources  Exercise:no but more active with walking and yard work.     Hyperlipidemia Follow-Up      Are you having any of the following symptoms? (Select all that apply)  No complaints of shortness of breath, chest pain or pressure.  No increased sweating or nausea with activity.  No left-sided neck or arm pain.  No  complaints of pain in calves when walking 1-2 blocks.    Are you regularly taking any medication or supplement to lower your cholesterol?   Yes- taking    Are you having muscle aches or other side effects that you think could be caused by your cholesterol lowering medication?  No    Hypertension Follow-up      Do you check your blood pressure regularly outside of the clinic? No     Are you following a low salt diet? Yes    Are your blood pressures ever more than 140 on the top number (systolic) OR more   than 90 on the bottom number (diastolic), for example 140/90?     BP Readings from Last 2 Encounters:   07/17/19 124/78   04/17/19 124/81     Hemoglobin A1C (%)   Date Value   07/17/2019 6.0 (H)   04/17/2019 6.6 (H)     LDL Cholesterol Calculated (mg/dL)   Date Value   03/26/2018 52   03/16/2017 70       Amount of exercise or physical activity: Active in the summer    Problems taking medications regularly: No    Medication side effects: none    Last clinic visit:4/17 for gout  Medication or other changes at last visit:no  Weight since last visit?  Down 10#  Wt Readings from Last 5 Encounters:   07/17/19 124.3 kg (274 lb)   05/15/19 125.8 kg (277 lb 6.4 oz)   04/17/19 128.8 kg (284 lb)   01/23/19 130.5 kg (287 lb 9.6 oz)   01/15/19 132 kg (291 lb)      History   Smoking Status     Former Smoker     Packs/day: 1.00     Years: 25.00     Types: Cigarettes     Quit date: 3/28/2010   Smokeless Tobacco     Never Used       Past medical history reviewed and updated    Patient Active Problem List    Diagnosis Date Noted     Morbid obesity (H) 04/17/2019     Priority: Medium     Generalized anxiety disorder 03/06/2013     Priority: Medium     Diagnosis updated by automated process. Provider to review and confirm.       Major depression in complete remission (H) 03/04/2013     Priority: Medium     MADDY (obstructive sleep apnea) 01/22/2013     Priority: Medium     Moderate MADDY (12/12/2012 with AHI 22.7, anisha desat 86%)        CVA (cerebral vascular accident) (H) 11/08/2012     Priority: Medium     Hypertension goal BP (blood pressure) < 130/80 06/08/2012     Priority: Medium     Cerebral embolism with cerebral infarction (H) 03/29/2012     Priority: Medium     Hyperlipidemia LDL goal <100 03/29/2012     Priority: Medium     Paresthesia 03/29/2012     Priority: Medium     Advanced care planning/counseling discussion 03/28/2012     Priority: Low     Worksheet given and will bring copy in when complete, will call if has questions           Current Outpatient Medications   Medication Sig Dispense Refill     allopurinol (ZYLOPRIM) 100 MG tablet Take 1 tablet (100 mg) by mouth daily 90 tablet 3     Ascorbic Acid (VITAMIN C PO) Take 1,000 mg by mouth        ASPIRIN NOT PRESCRIBED, INTENTIONAL, Antiplatelet medication not prescribed intentionally due to Plavix  0     atorvastatin (LIPITOR) 20 MG tablet Take 1 tablet (20 mg) by mouth daily 90 tablet 0     blood glucose (ACCU-CHEK GUIDE) test strip Use to test blood sugar 3 times daily or as directed. 100 each 11     blood glucose monitoring (ACCU-CHEK FASTCLIX) lancets Use to test blood sugar 3 times daily or as directed.  Ok to substitute alternative if insurance prefers. 102 each 11     calcium carbonate (TUMS) 500 MG chewable tablet Take 1 tablet by mouth 2 times daily. 180 chew tab 3     Cholecalciferol (VITAMIN D) 2000 UNITS tablet Take 2,000 Units by mouth daily. (Patient taking differently: Take 1,000 Units by mouth daily ) 100 tablet 3     clopidogrel (PLAVIX) 75 MG tablet Take 1 tablet (75 mg) by mouth daily 90 tablet 3     cyanocobalamin (B-12) 1000 MCG TBCR Take 1,000 mcg by mouth daily. 100 tablet 1     IBUPROFEN PO Take 400-600 mg by mouth as needed.         indomethacin (INDOCIN) 50 MG capsule Take 1 capsule (50 mg) by mouth 3 times daily (with meals) Take with food. 30 capsule 5     LANsoprazole (PREVACID SOLUTAB) 30 MG ODT Take 1 tablet (30 mg) by mouth as needed 90 tablet 0      "lisinopril (PRINIVIL/ZESTRIL) 2.5 MG tablet Take 1 tablet (2.5 mg) by mouth daily 90 tablet 1     mupirocin (BACTROBAN) 2 % external ointment Apply topically 3 times daily 10 days for skin sores. 22 g 0     order for DME  HEATED HUMIDITY   CHIN STRAP   NON-DSPOSABLE FILTERS   DISPOSABLE FILTERS (2 PER 1 MONTHS)   NASAL PILLOW (2 PER 1 MONTHS)   FULL FACE MASK  (1 PER 3 MONTHS)   FULL FACE CUSHION  (1 PER 1 MONTHS)   HUMIDIFIER CHAMBER (1 PER 6 MONTHS)   MASK (1 PER 3 MONTHS)   NASAL CUSHION (2 PER 1 MONTHS)   HEADGEAR (1 PER 6 MONTHS)   TUBING (1 PER 3 MONTHS)   HEATED TUBING (1 PER 3 MONTHS) 1 Device 1     ORDER FOR DME Pt was set up on a [employer] RemStar 60 Series Auto AFlex 5-15 cm H2O with heated humidity and a modem. Pt chose a Mirage FX nasal mask standard size.       ROS:General: POSITIVE for:, weight loss  Resp: No coughing, wheezing or shortness of breath  CV: No chest pains or palpitations  GI: No nausea, vomiting,  heartburn, abdominal pain, diarrhea, constipation or change in bowel habits  : see above   Musculoskeletal: No significant muscle or joint pains  Psychiatric: No problems with anxiety, depression or mental health    OBJECTIVE:Blood pressure 124/78, pulse 74, temperature 97.4  F (36.3  C), temperature source Tympanic, resp. rate 16, height 1.778 m (5' 10\"), weight 124.3 kg (274 lb). BMI=Body mass index is 39.31 kg/m .  GENERAL APPEARANCE ADULT: Alert, no acute distress, morbidly obese  NECK: No adenopathy,masses or thyromegaly  RESP: lungs clear to auscultation   CV: normal rate, regular rhythm, no murmur or gallop  ABDOMEN: soft, no organomegaly, masses or tenderness  MS: extremities normal, no peripheral edema   Foot exam:normal DP and PT pulses, no trophic changes or ulcerative lesions and normal monofilament exam    ASSESSMENT/PLAN:                                                    Lifestyle recommendations:regular " exercise, at least 150 minutes per week (average 30 minutes 5 times a week)  good job on losing weight!  keep working on losing weight (ideal BMI-body mass index is <25)  Diabetic recommendations:-eye exam is needed yearly  check feet on a daily basis  return for follow-up visit in 6 month(s)    (E11.9) Type 2 diabetes mellitus without complication, without long-term current use of insulin (H)  (primary encounter diagnosis)  Comment: doing much better  Plan: TSH with free T4 reflex, Hemoglobin A1c,         Albumin Random Urine Quantitative with Creat         Ratio, C FOOT EXAM  NO CHARGE        Check blood sugars at various times; in the morning when fasting, before meals and at bedtime.  You can also check 1-2 hours after a meal.  It is helpful to check at different times of the day to see how blood sugars are doing as a brief snapshot.    Keep a record of the readings to see if there are consistent highs or lows at certain times of the day.  This will help in adjusting medication.   Start by checking 1 time(s) a day.    A normal fasting glucometer is under 100.   Our goal is to have fasting and before meal glucometer readings in the range of  and 1-2 hours after a meal of less than 180.  This will help in keeping Hgb A1C under 7.  Having diabetes mellitus well controlled can help prevent future diabetes complications like vision problems leading to blindness, kidney problems and damage to nerves in legs and feet.      (E66.01) Morbid obesity (H)  Comment: good weight loss in the past 3 months    (E78.5) Hyperlipidemia LDL goal <100  Comment:   Plan: Lipid panel reflex to direct LDL Fasting,         atorvastatin (LIPITOR) 20 MG tablet        Checking today.  Refills.      (R39.9) Urinary symptom or sign  Comment: possible bladder infection  Plan: *UA reflex to Microscopic and Culture ([place]         and [facility] (except [place] and         [facility])          (I10) Hypertension goal BP (blood  pressure) < 130/80  Comment: doing well  Plan: No change in current treatment plan.     (M1A.0710) Chronic gout of right ankle, unspecified cause  Comment: has been OK  Plan: Uric acid        Recheck uric acid gout test.  Goal is under 5-6 to prevent gout.        Diabetes Type II, A1c Controlled, non-insulin dependent   Associated with the following complications:none

## 2019-07-17 NOTE — LETTER
[facility]  [address] [place] [id number]  Phone: [phone]  Fax: [phone]    07/17/19    Lyle Tenorio  [address] [place] [id number]      To whom it may concern:     Mr. Tenorio was seen in clinic this morning for a medical problem.     Sincerely,      Arnold Irizarry MD

## 2019-07-29 PROCEDURE — 82365 CALCULUS SPECTROSCOPY: CPT | Mod: 90 | Performed by: FAMILY MEDICINE

## 2019-07-29 PROCEDURE — 99000 SPECIMEN HANDLING OFFICE-LAB: CPT | Performed by: FAMILY MEDICINE

## 2019-09-11 ENCOUNTER — OFFICE VISIT (OUTPATIENT)
Dept: FAMILY MEDICINE | Facility: CLINIC | Age: 54
End: 2019-09-11
Payer: COMMERCIAL

## 2019-09-11 VITALS
OXYGEN SATURATION: 94 % | BODY MASS INDEX: 39.75 KG/M2 | WEIGHT: 277 LBS | SYSTOLIC BLOOD PRESSURE: 120 MMHG | RESPIRATION RATE: 17 BRPM | HEART RATE: 71 BPM | DIASTOLIC BLOOD PRESSURE: 76 MMHG | TEMPERATURE: 97.8 F

## 2019-09-11 DIAGNOSIS — N20.0 KIDNEY STONE: ICD-10-CM

## 2019-09-11 DIAGNOSIS — R51.9 FACIAL PAIN: Primary | ICD-10-CM

## 2019-09-11 DIAGNOSIS — L57.0 AK (ACTINIC KERATOSIS): ICD-10-CM

## 2019-09-11 LAB
ALBUMIN UR-MCNC: NEGATIVE MG/DL
APPEARANCE UR: CLEAR
BILIRUB UR QL STRIP: NEGATIVE
COLOR UR AUTO: YELLOW
GLUCOSE UR STRIP-MCNC: NEGATIVE MG/DL
HGB UR QL STRIP: NEGATIVE
KETONES UR STRIP-MCNC: NEGATIVE MG/DL
LEUKOCYTE ESTERASE UR QL STRIP: NEGATIVE
NITRATE UR QL: NEGATIVE
PH UR STRIP: 5.5 PH (ref 5–7)
SOURCE: NORMAL
SP GR UR STRIP: 1.02 (ref 1–1.03)
UROBILINOGEN UR STRIP-ACNC: 0.2 EU/DL (ref 0.2–1)

## 2019-09-11 PROCEDURE — 99214 OFFICE O/P EST MOD 30 MIN: CPT | Mod: 25 | Performed by: FAMILY MEDICINE

## 2019-09-11 PROCEDURE — 17003 DESTRUCT PREMALG LES 2-14: CPT | Performed by: FAMILY MEDICINE

## 2019-09-11 PROCEDURE — 81003 URINALYSIS AUTO W/O SCOPE: CPT | Performed by: FAMILY MEDICINE

## 2019-09-11 PROCEDURE — 17000 DESTRUCT PREMALG LESION: CPT | Performed by: FAMILY MEDICINE

## 2019-09-11 ASSESSMENT — ANXIETY QUESTIONNAIRES
7. FEELING AFRAID AS IF SOMETHING AWFUL MIGHT HAPPEN: NOT AT ALL
3. WORRYING TOO MUCH ABOUT DIFFERENT THINGS: NOT AT ALL
1. FEELING NERVOUS, ANXIOUS, OR ON EDGE: NOT AT ALL
GAD7 TOTAL SCORE: 0
6. BECOMING EASILY ANNOYED OR IRRITABLE: NOT AT ALL
2. NOT BEING ABLE TO STOP OR CONTROL WORRYING: NOT AT ALL
IF YOU CHECKED OFF ANY PROBLEMS ON THIS QUESTIONNAIRE, HOW DIFFICULT HAVE THESE PROBLEMS MADE IT FOR YOU TO DO YOUR WORK, TAKE CARE OF THINGS AT HOME, OR GET ALONG WITH OTHER PEOPLE: NOT DIFFICULT AT ALL
5. BEING SO RESTLESS THAT IT IS HARD TO SIT STILL: NOT AT ALL

## 2019-09-11 ASSESSMENT — PATIENT HEALTH QUESTIONNAIRE - PHQ9
5. POOR APPETITE OR OVEREATING: NOT AT ALL
SUM OF ALL RESPONSES TO PHQ QUESTIONS 1-9: 0

## 2019-09-11 ASSESSMENT — PAIN SCALES - GENERAL: PAINLEVEL: NO PAIN (0)

## 2019-09-11 NOTE — NURSING NOTE
"Chief Complaint   Patient presents with     Facial Swelling     under left eye and temple area feels \"tight\" 3 weeks duration.     Kidney Stone Related     States passed 1 stone 7/18/2019. No urinary problem sx since then.     Derm Problem     ? warts on R forearm.       Initial There were no vitals taken for this visit. Estimated body mass index is 39.31 kg/m  as calculated from the following:    Height as of 7/17/19: 1.778 m (5' 10\").    Weight as of 7/17/19: 124.3 kg (274 lb).    Patient presents to the clinic using No DME    Health Maintenance that is potentially due pending provider review:  PHQ9 and flu shot- which will get through work.        Is there anyone who you would like to be able to receive your results?   If yes have patient fill out MIRNA    "

## 2019-09-11 NOTE — PROGRESS NOTES
"SUBJECTIVE: Lyle Tenorio is a 54 year old male  Chief Complaint   Patient presents with     Facial Swelling     under left eye and temple area feels \"tight\" 3 weeks duration.     Kidney Stone Related     States passed 1 stone 7/18/2019. No urinary problem sx since then.     Derm Problem     ? warts on R forearm.          Duration: 3 weeks    Description (location/character/radiation): swelling under left eye and temple area- intemittent    Intensity:      Accompanying signs and symptoms: none    History (similar episodes/previous evaluation): None    Precipitating or alleviating factors: None    Therapies tried and outcome: None   Swelling left face.  Always swelling, sometimes worse.    Aggravating factors: ?  Feels \"harder\" on that side.  No skin erythema.  Can swell under right eye.   Sore to chew on that side.   Grinds teeth at night.   Has not seen dentist recently.      Problem 2:   Discuss previous kidney stone  Some feeling of difficuty emptying bladder.  No pain of flank pain.   Had stone come out in July.  No urinary symptoms since then.   He did capture stone.  Brought it in today.   He has had kidney stones in the past when in early 20s.     Problem 3:   check possible warts on R forearm    Patient Active Problem List   Diagnosis     Advanced care planning/counseling discussion     Cerebral embolism with cerebral infarction (H)     Hyperlipidemia LDL goal <100     Paresthesia     Hypertension goal BP (blood pressure) < 130/80     CVA (cerebral vascular accident) (H)     MADDY (obstructive sleep apnea)     Major depression in complete remission (H)     Generalized anxiety disorder     Morbid obesity (H)     Type 2 diabetes mellitus without complication, without long-term current use of insulin (H)     Chronic gout of right ankle, unspecified cause      OBJECTIVE:Blood pressure 120/76, pulse 71, temperature 97.8  F (36.6  C), temperature source Tympanic, resp. rate 17, weight 125.6 kg (277 lb), SpO2 94 %. " BMI=Body mass index is 39.75 kg/m .  GENERAL APPEARANCE ADULT: Alert, no acute distress, obese  EYES: PERRL, EOM normal, conjunctiva and lids normal  HENT: Ears and TMs normal, oral mucosa and posterior oropharynx normal  NECK: No adenopathy,masses or thyromegaly  SKIN: He has 3 small 3 mm crusted skin spots dorsal right forearm.  They appear irritated not sure if these are actinic keratoses.  He does have rather puffy face bilateral.  Mild tenderness upper left parotid area and TMJ area    ASSESSMENT:   (R51) Facial pain  (primary encounter diagnosis)  Comment: This may be jaw joint (TMJ) strain.  Sometimes a salivary inflammation or stone could cause pain.  Dental abscess or problem is another possibility.  I favor TMJ strain is the most likely cause for his pain.  Not consistent with parotitis  Plan:   I expect this will improve in the next week or two.   Avoid opening mouth very wide or chewing a lot like gum or sticky candies.   IF you have worse swelling or pain or skin redness, recheck.    IF you have tooth problems, have dentist check it out.   You could try anti-inflammatory medications like ibuprofen or Aleve which may help.    (L57.0) AK (actinic keratosis)  Comment: spots on right forearm may be precancer skin spots.   Plan:Three spots treated today.   Treatment was begun with Liquid nitrogen, freeze/thaw/freeze.  Expect redness for a day or two, then possible blister or sometimes blood blister.  Then the wound may get raw and sore and scab over.  Skin spots should steadily heal and look pink for a few weeks.  New skin should be smooth and the rough irregular skin gone.  Recheck if abnormal skin does not disappear with the treatment.     (N20.0) Kidney stone  Comment: This was likely cause for urine symptoms.  Currently no symptoms.   Plan: Calculi  Urinary ([facility]), *UA reflex to         Microscopic and Culture ([facility]         [facility]inics (except [place] and [facility])          Check urine as  there were a few blood cells when checked in July  Send stone for analysis.  In most cases of kidney stones, drinking lots of liquids can help prevent further stones.

## 2019-09-12 ASSESSMENT — ANXIETY QUESTIONNAIRES: GAD7 TOTAL SCORE: 0

## 2019-09-13 NOTE — RESULT ENCOUNTER NOTE
CONRAD Alvarenga,  Your urine appears normal, no sign of blood at this time.   Kidney stone analysis is pending.   SAVI RAUSCH MD

## 2019-09-14 LAB
APPEARANCE STONE: NORMAL
COMPN STONE: NORMAL
NUMBER STONE: 1
SIZE STONE: NORMAL MM
WT STONE: 49 MG

## 2019-09-15 NOTE — RESULT ENCOUNTER NOTE
Leroy Alvarenga,  Your kidney stone is calcium oxalate which is the most common type of kidney stone.    For all kidney stones, the best treatment is drinking lots of fluids to keep urine more like water colored.    If you should have more stones in the future, you may need to have additional blood tests and urine collections to see if any other treatment would help.   SAVI RAUSCH MD

## 2019-12-27 DIAGNOSIS — I63.429 CEREBRAL INFARCTION DUE TO EMBOLISM OF ANTERIOR CEREBRAL ARTERY, UNSPECIFIED BLOOD VESSEL LATERALITY (H): ICD-10-CM

## 2019-12-27 RX ORDER — LISINOPRIL 2.5 MG/1
2.5 TABLET ORAL DAILY
Qty: 90 TABLET | Refills: 1 | Status: SHIPPED | OUTPATIENT
Start: 2019-12-27 | End: 2020-07-02

## 2019-12-27 NOTE — TELEPHONE ENCOUNTER
"Requested Prescriptions   Pending Prescriptions Disp Refills     lisinopril (PRINIVIL/ZESTRIL) 2.5 MG tablet [Pharmacy Med Name: Lisinopril 2.5 MG Oral Tablet]  0     Sig: TAKE 1 TABLET BY MOUTH ONCE DAILY   Last Written Prescription Date:  6/7/19  Last Fill Quantity: 90 tab,  # refills: 1   Last office visit: 9/11/2019 with prescribing provider:  Arnold Irizarry     Future Office Visit:        ACE Inhibitors (Including Combos) Protocol Passed - 12/27/2019  5:30 AM        Passed - Blood pressure under 140/90 in past 12 months     BP Readings from Last 3 Encounters:   09/11/19 120/76   07/17/19 124/78   04/17/19 124/81                 Passed - Recent (12 mo) or future (30 days) visit within the authorizing provider's specialty     Patient has had an office visit with the authorizing provider or a provider within the authorizing providers department within the previous 12 mos or has a future within next 30 days. See \"Patient Info\" tab in inbasket, or \"Choose Columns\" in Meds & Orders section of the refill encounter.              Passed - Medication is active on med list        Passed - Patient is age 18 or older        Passed - Normal serum creatinine on file in past 12 months     Recent Labs   Lab Test 04/17/19  0955   CR 0.82             Passed - Normal serum potassium on file in past 12 months     Recent Labs   Lab Test 04/17/19  0955   POTASSIUM 4.4               "

## 2019-12-27 NOTE — TELEPHONE ENCOUNTER
Routing refill request to provider for review/approval because: Drug interaction warning    Drug-Drug Interaction Report    Indomethacin and Derivatives / ACE Inhibitors     Significance: Moderate     Warning: Antihypertensive effects of ACE inhibitors may be decreased by indomethacin Also, nephrotoxicity associated with ACE inhibitors or indomethacin may be increased by this drug combination.    Onset: Rapid    Documentation Level: Probable    Interacting Medications/Orders:  Indomethacin and Derivatives  Oral or Non-Oral, Systemic ACE Inhibitors  Oral or Non-Oral, Systemic   1. indomethacin    Order ([id number]): indomethacin (INDOCIN) 50 MG capsule Route: Oral  Start: 04/17/2019 End: none Frequency: 3 TIMES DAILY WITH MEALS 1. lisinopril    Order: lisinopril (PRINIVIL/ZESTRIL) 2.5 MG tablet [Pharmacy Med Name: Lisinopril 2.5 MG Oral Tablet] Route: Oral  Start: 12/27/2019 0800 End: none Frequency: DAILY     Management Code: Professional review suggested    Effects: Antihypertensive effects of ACE inhibitors may be decreased or completely abolished, particularly in patients with low renin hypertension. Also, nephrotoxicity associated with ACE inhibitors or indomethacin may be increased by this drug combination.    Mechanism: indomethacin is a potent inhibitor of renal prostaglandin synthesis necessary for ACE inhibitors antihypertensive action.    Management: Monitor blood pressure closely in patients receiving this combination. If blood pressure control deteriorates, consider stopping indomethacin. Periodic measurement of renal function may be necessary.    Discussion: Increased prostaglandin synthesis may play a role in the antihypertensive action of captopril. Indomethacin, a nonselective prostaglandin synthesis inhibitor, may interfere with captopril's antihypertensive action. Studies conducted in healthy volunteers and patients with minor or moderate hypertension have indicated that indomethacin significantly  "attenuates blood pressure lowering effect induced by captopril administered as a single dose or as chronic antihypertensive treatment (1-8,11,14,16, 18). Patients with low renin hypertension appear to be more susceptible to this interaction (1,2,5). Sulindac, an NSAID with a controversial \"renal sparing property\", has been shown to cause a minor (insignificant) effect on captopril in one study (6). Although pharmacologic considerations suggest that other ACE inhibitors may interact similarly (10), data are limited and inconsistent. Indomethacin did not significantly interfere with the antihypertensive effect of enalapril (9,14) except in one study (12) and one case (17). Indomethacin did not blunt lisinopril's blood pressure lowering effect (13) and neither did sulindac on that of enalapril (9). Increased nephrotoxicity with this drug combination has been reported (15). Although more studies are needed to establish the clinical importance of this finding, close monitoring of the antihypertensive efficacy of ACE Inhibitors and renal function is warranted.    References: 1. Adolfo CARMEN et al: [place] J Exp Med 132:117(1980). 2. Adolfo K et al: Clin Sci 59:141s(1980). 3. Ogihara T et al: Clin Pharmacol Ther 30:328(1981). 4. Meza TJ et al: Hypertension 3:168(1981). 5. Fujita T et al: Clin Exp Hypertens 3:939(1981). 6. Salvetti A et al: Clin Sci 63:261s(1982). 7. Witzgall H et al: Clin Sci 62:611(1982). 8. Juan K et al: Br J Clin Pharmacol 14:87s(1982). 9. Oparil S et al: Clin Res 31:538A(1983). 10. Gavin MIRANDA et al: Drug Intell Clin Pharm 20:177(1986). <review> 11. George S et al: Clin Exper Theor Pract A9:623(1987). 12. Christina MARRUFO et al: Clin Exper Theor Pract A9:559(1987). 13. Denys OSORIO et al: Clin Pharmacol Ther 41:219(1987). 14. María PP et al: J Intern Med 226:139(1989). 15. Darcie JAY et al: [facility] Med J 83:1144(1990). 16. Rosi JN et al: Br Heart J 73:434(1995). 17. Urban S: [facility] Med J 84:411(1991). 18. Padmini LUGO, et " al: Hypertension 36:461(2000).    DTMS Copyright 2019 [employer]. All rights reserved.  Version 19.4 Expires January 2020     Print   Xochitl HAWK RN

## 2019-12-30 DIAGNOSIS — M10.9 ACUTE GOUT OF RIGHT ANKLE, UNSPECIFIED CAUSE: ICD-10-CM

## 2019-12-30 RX ORDER — ALLOPURINOL 100 MG/1
100 TABLET ORAL DAILY
Qty: 90 TABLET | Refills: 1 | Status: SHIPPED | OUTPATIENT
Start: 2019-12-30 | End: 2020-07-02

## 2019-12-30 NOTE — TELEPHONE ENCOUNTER
Reason for Call:  Medication or medication refill:    Do you use a [facility]?  Name of the pharmacy and phone number for the current request:  [facility] [phone]    Name of the medication requested:    allopurinol (ZYLOPRIM) 100 MG tablet  Last Written Prescription Date:  01/23/19  Last Fill Quantity: 90,  # refills: 3   Last office visit: 9/11/2019 with prescribing provider:  Dr. Washington   Future Office Visit:              Call taken on 12/30/2019 at 12:34 PM by Sherley Bateman

## 2020-01-07 ENCOUNTER — ALLIED HEALTH/NURSE VISIT (OUTPATIENT)
Dept: EDUCATION SERVICES | Facility: CLINIC | Age: 55
End: 2020-01-07
Payer: COMMERCIAL

## 2020-01-07 VITALS — BODY MASS INDEX: 41.64 KG/M2 | WEIGHT: 290.2 LBS

## 2020-01-07 DIAGNOSIS — E11.9 TYPE 2 DIABETES MELLITUS WITHOUT COMPLICATION, WITHOUT LONG-TERM CURRENT USE OF INSULIN (H): Primary | ICD-10-CM

## 2020-01-07 PROCEDURE — G0108 DIAB MANAGE TRN  PER INDIV: HCPCS | Performed by: DIETITIAN, REGISTERED

## 2020-01-07 NOTE — PROGRESS NOTES
"Diabetes Self-Management Education & Support    Diabetes Education Self Management & Training    SUBJECTIVE/OBJECTIVE:  Presents for: Individual review  Accompanied by: Self  Diabetes education in the past 24mo: (P) Yes  Focus of Visit: Being Active, Healthy Eating  Diabetes type: Type 2  Disease course: (P) Stable  How confident are you filling out medical forms by yourself:: Not Assessed  Transportation concerns: No  Other concerns:: None  Cultural Influences/Ethnic Background:  American      Diabetes Symptoms & Complications  Blurred vision: (P) No  Fatigue: (P) No  Foot ulcerations: (P) No  Polydipsia: (P) No  Polyphagia: (P) No  Polyuria: (P) No  Visual change: (P) No  Weakness: (P) No  Slow healing wounds: (P) No  Weight trend: (P) Stable  Autonomic neuropathy: (P) No  CVA: (P) No  Nephropathy: (P) No  Peripheral neuropathy: (P) No  Peripheral Vascular Disease: (P) No  Retinopathy: (P) No  Sexual dysfunction: (P) No    Patient Problem List and Family Medical History reviewed for relevant medical history, current medical status, and diabetes risk factors.    Vitals:  Wt 131.6 kg (290 lb 3.2 oz)   BMI 41.64 kg/m    Estimated body mass index is 41.64 kg/m  as calculated from the following:    Height as of 7/17/19: 1.778 m (5' 10\").    Weight as of this encounter: 131.6 kg (290 lb 3.2 oz).   Last 3 BP:   BP Readings from Last 3 Encounters:   09/11/19 120/76   07/17/19 124/78   04/17/19 124/81       History   Smoking Status     Former Smoker     Packs/day: 1.00     Years: 25.00     Types: Cigarettes     Quit date: 3/28/2010   Smokeless Tobacco     Never Used       Labs:  Lab Results   Component Value Date    A1C 6.0 07/17/2019     Lab Results   Component Value Date     04/17/2019     Lab Results   Component Value Date    LDL 42 07/17/2019     HDL Cholesterol   Date Value Ref Range Status   07/17/2019 57 >39 mg/dL Final   ]  GFR Estimate   Date Value Ref Range Status   04/17/2019 >90 >60 mL/min/[1.73_m2] " Final     Comment:     Non  GFR Calc  Starting 12/18/2018, serum creatinine based estimated GFR (eGFR) will be   calculated using the Chronic Kidney Disease Epidemiology Collaboration   (CKD-EPI) equation.       GFR Estimate If Black   Date Value Ref Range Status   04/17/2019 >90 >60 mL/min/[1.73_m2] Final     Comment:      GFR Calc  Starting 12/18/2018, serum creatinine based estimated GFR (eGFR) will be   calculated using the Chronic Kidney Disease Epidemiology Collaboration   (CKD-EPI) equation.       Lab Results   Component Value Date    CR 0.82 04/17/2019     No results found for: MICROALBUMIN    Healthy Eating  Cultural/Episcopal diet restrictions?: (P) No  Meal planning: (P) Avoiding sweets, Low salt, Smaller portions  Meals include: (P) Breakfast, Dinner, Snacks  Breakfast: mid morning: protein for breakfast meat with toast usually meat and protein, eggs  Dinner: 4-5 pm: enchiladas, salasa and chips (tries to portion it into small ones), pizza if not having time.  chicken craving.  burgers with not  alot of extras on.    Snacks: chips and salsa   Other: encouraged him to do free veggies with his meals to help  Beverages: (P) Water, Coffee, Diet soda  Has patient met with a dietitian in the past?: (P) No    Being Active  Exercise:: Yes(harder to get around, work on getting on the treadmill)  Barrier to exercise: (P) Time    Discussed ways to add in more activity, maybe do a defense class with coworkers, walk on treadmill.  Aware he needs to do more.    Monitoring  Blood Glucose Meter: (P) Accu-check  Blood glucose trend: (P) Fluctuating minimally  Low Glucose Range (mg/dL): (P) 110-130  High Glucose Range (mg/dL): (P) 130-140  1/7 117  1/6 133, 98  1/5 138, 131  1/4  149, 128  1/3  123, 104  1/2  134, 103  1/1  101      Taking Medications      Current Treatments: (P) Diet    Problem Solving  Hypoglycemia Frequency: (P) Never  Hypoglycemia Treatment: (P) Other food  Medical  alert: (P) No  Severe weather/disaster plan for diabetes management?: (P) No  DKA prevention plan?: (P) No  Sick day plan for diabetes management?: (P) No              Reducing Risks  CAD Risks: (P) Family history  Has dilated eye exam at least once a year?: (P) Yes  Sees dentist every 6 months?: (P) Yes  Sees podiatrist (foot doctor)?: (P) No    Healthy Coping  Informal Support system:: (P) Family, Friends, Partner  Difficulty affording diabetes management supplies?: (P) No  Patient Activation Measure Survey Score:  PRINCE Score (Last Two) 4/19/2012   PRINCE Raw Score 39   Activation Score 56.4   PRINCE Level 3       ASSESSMENT:  Wt back up he will work on adding in activity again and more vegetables.  He will make an appt with Dr. Irizarry since it has been 6 months.  He is aware it is important to continue to test his blood sugars.    Set exercise goal.        Patient's most recent   Lab Results   Component Value Date    A1C 6.0 07/17/2019    is meeting goal of <7.0    INTERVENTION:   Diabetes knowledge and skills assessment:     Patient is knowledgeable in diabetes management concepts related to: Healthy Eating, Being Active and Monitoring    Patient needs further education on the following diabetes management concepts: Healthy Eating, Being Active, Monitoring, Taking Medication, Problem Solving, Reducing Risks and Healthy Coping    Based on learning assessment above, most appropriate setting for further diabetes education would be: Individual setting.    Education provided today on:  AADE Self-Care Behaviors:  Healthy Eating: consistency in amount, composition, and timing of food intake and adding in more vegetables.  Being Active: relationship to blood glucose and describe appropriate activity program  Monitoring: work on testing    Opportunities for ongoing education and support in diabetes-self management were discussed.    Pt verbalized understanding of concepts discussed and recommendations provided today.        Education Materials Provided:  No new materials provided today    PLAN:  See Patient Instructions for co-developed, patient-stated behavior change goals.  AVS printed and provided to patient today. See Follow-Up section for recommended follow-up.      Time Spent: 60 minutes  Encounter Type: Individual    Any diabetes medication dose changes were made via the CDE Protocol and Collaborative Practice Agreement with the patient's primary care provider. A copy of this encounter was shared with the provider.

## 2020-01-07 NOTE — PATIENT INSTRUCTIONS
1. 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    2.  Work on making sure you add that vegetable to your supper meal.     3. Make an appt with Dr. Irizarry, it has been 6 months.

## 2020-01-17 ENCOUNTER — OFFICE VISIT (OUTPATIENT)
Dept: FAMILY MEDICINE | Facility: CLINIC | Age: 55
End: 2020-01-17
Payer: COMMERCIAL

## 2020-01-17 VITALS
WEIGHT: 283.4 LBS | HEIGHT: 71 IN | SYSTOLIC BLOOD PRESSURE: 120 MMHG | TEMPERATURE: 96.8 F | OXYGEN SATURATION: 95 % | BODY MASS INDEX: 39.68 KG/M2 | DIASTOLIC BLOOD PRESSURE: 74 MMHG | RESPIRATION RATE: 17 BRPM | HEART RATE: 73 BPM

## 2020-01-17 DIAGNOSIS — E78.5 HYPERLIPIDEMIA LDL GOAL <100: ICD-10-CM

## 2020-01-17 DIAGNOSIS — K21.9 GASTROESOPHAGEAL REFLUX DISEASE WITHOUT ESOPHAGITIS: ICD-10-CM

## 2020-01-17 DIAGNOSIS — E11.9 TYPE 2 DIABETES MELLITUS WITHOUT COMPLICATION, WITHOUT LONG-TERM CURRENT USE OF INSULIN (H): Primary | ICD-10-CM

## 2020-01-17 DIAGNOSIS — I10 HYPERTENSION GOAL BP (BLOOD PRESSURE) < 130/80: ICD-10-CM

## 2020-01-17 DIAGNOSIS — G47.33 OSA (OBSTRUCTIVE SLEEP APNEA): ICD-10-CM

## 2020-01-17 DIAGNOSIS — I63.10 CEREBROVASCULAR ACCIDENT (CVA) DUE TO EMBOLISM OF PRECEREBRAL ARTERY (H): ICD-10-CM

## 2020-01-17 LAB — HBA1C MFR BLD: 6.2 % (ref 0–5.6)

## 2020-01-17 PROCEDURE — 83036 HEMOGLOBIN GLYCOSYLATED A1C: CPT | Performed by: FAMILY MEDICINE

## 2020-01-17 PROCEDURE — 99214 OFFICE O/P EST MOD 30 MIN: CPT | Performed by: FAMILY MEDICINE

## 2020-01-17 PROCEDURE — 36415 COLL VENOUS BLD VENIPUNCTURE: CPT | Performed by: FAMILY MEDICINE

## 2020-01-17 RX ORDER — CLOPIDOGREL BISULFATE 75 MG/1
75 TABLET ORAL DAILY
Qty: 90 TABLET | Refills: 3 | Status: SHIPPED | OUTPATIENT
Start: 2020-01-17 | End: 2021-03-10

## 2020-01-17 RX ORDER — LANSOPRAZOLE 30 MG/1
30 TABLET, ORALLY DISINTEGRATING, DELAYED RELEASE ORAL DAILY
Qty: 90 TABLET | Refills: 3 | Status: SHIPPED | OUTPATIENT
Start: 2020-01-17 | End: 2021-02-08

## 2020-01-17 ASSESSMENT — MIFFLIN-ST. JEOR: SCORE: 2153.65

## 2020-01-17 ASSESSMENT — PAIN SCALES - GENERAL: PAINLEVEL: NO PAIN (0)

## 2020-01-17 NOTE — PATIENT INSTRUCTIONS
ASSESSMENT/PLAN:                                                    Lifestyle recommendations:regular exercise, at least 150 minutes per week (average 30 minutes 5 times a week)  keep working on losing weight (ideal BMI-body mass index is <25)  Diabetic recommendations:-return for follow-up visit in 6 month(s)    (E11.9) Type 2 diabetes mellitus without complication, without long-term current use of insulin (H)  (primary encounter diagnosis)  Comment: doing well  Plan: Hemoglobin A1c        No change in current treatment plan.     Recheck in 6 months.     (G47.33) MADDY (obstructive sleep apnea)  Comment:   Plan: follow-up with sleep clinic as planned.     (I63.10) Cerebrovascular accident (CVA) due to embolism of precerebral artery (H)  Comment:   Plan: clopidogrel (PLAVIX) 75 MG tablet        Continue Plavix.     (I10) Hypertension goal BP (blood pressure) < 130/80  Comment: doing well  Plan: No change in current treatment plan.     (E78.5) Hyperlipidemia LDL goal <100  Comment: has been well controlled  Plan: REfills when needed.  No change in current treatment plan.     (K21.9) Gastroesophageal reflux disease without esophagitis  Comment: takes occasional lansoprozole   Plan: LANsoprazole (PREVACID SOLUTAB) 30 MG ODT        REfills as needed.

## 2020-01-17 NOTE — PROGRESS NOTES
SUBJECTIVE: Lyle Tenorio is a 54 year old male  Chief Complaint   Patient presents with     Diabetes      In today for diabetes mellitus follow-up.     No further kidney stone symptoms.       He did see [facility] diabetic educator last week.   Diabetes Follow-up    How often are you checking your blood sugar? Two times daily. Checks AM and bedtime.  90 day average-128-, 30 day- 129  Currently on no diabetic medications.   Glucometer range within the past month=90s-144  Blood sugar testing frequency justification:  He could check once daily.   What time of day are you checking your blood sugars (select all that apply)?  Before and after meals  Have you had any blood sugars above 200?  No  Have you had any blood sugars below 70?  No    What symptoms do you notice when your blood sugar is low?  None    What concerns do you have today about your diabetes? None     Do you have any of these symptoms? (Select all that apply)  No numbness or tingling in feet.  No redness, sores or blisters on feet.  No complaints of excessive thirst.  No reports of blurry vision.  No significant changes to weight.    Have you had a diabetic eye exam in the last 12 months? No      BP Readings from Last 2 Encounters:   01/17/20 120/74   09/11/19 120/76     Hemoglobin A1C (%)   Date Value   01/17/2020 6.2 (H)   07/17/2019 6.0 (H)     LDL Cholesterol Calculated (mg/dL)   Date Value   07/17/2019 42   03/26/2018 52                 How many servings of fruits and vegetables do you eat daily?  0-1    On average, how many sweetened beverages do you drink each day (Examples: soda, juice, sweet tea, etc.  Do NOT count diet or artificially sweetened beverages)?   0    How many days per week do you exercise enough to make your heart beat faster? 5    How many minutes a day do you exercise enough to make your heart beat faster? 10 - 19    How many days per week do you miss taking your medication? 0      Last clinic visit:7/17/2019  Medication or other  changes at last visit:none.   He had positive microalbumin   Weight since last visit? Up 9#  Wt Readings from Last 5 Encounters:   01/17/20 128.5 kg (283 lb 6.4 oz)   01/07/20 131.6 kg (290 lb 3.2 oz)   09/11/19 125.6 kg (277 lb)   07/17/19 124.3 kg (274 lb)   05/15/19 125.8 kg (277 lb 6.4 oz)      History   Smoking Status     Former Smoker     Packs/day: 1.00     Years: 25.00     Types: Cigarettes     Quit date: 3/28/2010   Smokeless Tobacco     Never Used       Past medical history reviewed and updated    Patient Active Problem List    Diagnosis Date Noted     Type 2 diabetes mellitus without complication, without long-term current use of insulin (H) 07/17/2019     Priority: Medium     Chronic gout of right ankle, unspecified cause 07/17/2019     Priority: Medium     Morbid obesity (H) 04/17/2019     Priority: Medium     Generalized anxiety disorder 03/06/2013     Priority: Medium     Diagnosis updated by automated process. Provider to review and confirm.       Major depression in complete remission (H) 03/04/2013     Priority: Medium     MADDY (obstructive sleep apnea) 01/22/2013     Priority: Medium     Moderate MADDY (12/12/2012 with AHI 22.7, anisha desat 86%)       CVA (cerebral vascular accident) (H) 11/08/2012     Priority: Medium     Hypertension goal BP (blood pressure) < 130/80 06/08/2012     Priority: Medium     Cerebral embolism with cerebral infarction (H) 03/29/2012     Priority: Medium     Hyperlipidemia LDL goal <100 03/29/2012     Priority: Medium     Paresthesia 03/29/2012     Priority: Medium     Advanced care planning/counseling discussion 03/28/2012     Priority: Low     Worksheet given and will bring copy in when complete, will call if has questions           Current Outpatient Medications   Medication Sig Dispense Refill     allopurinol (ZYLOPRIM) 100 MG tablet Take 1 tablet (100 mg) by mouth daily 90 tablet 1     Ascorbic Acid (VITAMIN C PO) Take 1,000 mg by mouth        ASPIRIN NOT PRESCRIBED,  INTENTIONAL, Antiplatelet medication not prescribed intentionally due to Plavix  0     atorvastatin (LIPITOR) 20 MG tablet Take 1 tablet (20 mg) by mouth daily 90 tablet 3     blood glucose (ACCU-CHEK GUIDE) test strip Use to test blood sugar 3 times daily or as directed. 100 each 11     blood glucose monitoring (ACCU-CHEK FASTCLIX) lancets Use to test blood sugar 3 times daily or as directed.  Ok to substitute alternative if insurance prefers. 102 each 11     calcium carbonate (TUMS) 500 MG chewable tablet Take 1 tablet by mouth 2 times daily. 180 chew tab 3     Cholecalciferol (VITAMIN D) 2000 UNITS tablet Take 2,000 Units by mouth daily. (Patient taking differently: Take 1,000 Units by mouth daily ) 100 tablet 3     clopidogrel (PLAVIX) 75 MG tablet Take 1 tablet (75 mg) by mouth daily 90 tablet 3     cyanocobalamin (B-12) 1000 MCG TBCR Take 1,000 mcg by mouth daily. 100 tablet 1     IBUPROFEN PO Take 400-600 mg by mouth as needed.         LANsoprazole (PREVACID SOLUTAB) 30 MG ODT Take 1 tablet (30 mg) by mouth as needed 90 tablet 0     lisinopril (PRINIVIL/ZESTRIL) 2.5 MG tablet Take 1 tablet (2.5 mg) by mouth daily 90 tablet 1     mupirocin (BACTROBAN) 2 % external ointment Apply topically 3 times daily 10 days for skin sores. 22 g 0     order for DME  HEATED HUMIDITY   CHIN STRAP   NON-DSPOSABLE FILTERS   DISPOSABLE FILTERS (2 PER 1 MONTHS)   NASAL PILLOW (2 PER 1 MONTHS)   FULL FACE MASK  (1 PER 3 MONTHS)   FULL FACE CUSHION  (1 PER 1 MONTHS)   HUMIDIFIER CHAMBER (1 PER 6 MONTHS)   MASK (1 PER 3 MONTHS)   NASAL CUSHION (2 PER 1 MONTHS)   HEADGEAR (1 PER 6 MONTHS)   TUBING (1 PER 3 MONTHS)   HEATED TUBING (1 PER 3 MONTHS) 1 Device 1     ORDER FOR DME Pt was set up on a Resp[employer]ics RemStar 60 Series Auto AFlex 5-15 cm H2O with heated humidity and a modem. Pt chose a Mirage FX nasal mask standard size.       indomethacin (INDOCIN) 50 MG capsule  "Take 1 capsule (50 mg) by mouth 3 times daily (with meals) Take with food. 30 capsule 5   Trying to walk.     ROS:General: POSITIVE for:, weight gain  Resp: No coughing, wheezing or shortness of breath  CV: No chest pains or palpitations  GI: POSITIVE for:, heartburn or reflux, once in a while   : No urinary frequency or dysuria, bladder or kidney problems  Musculoskeletal: No significant muscle or joint pains  Psychiatric: No problems with anxiety, depression or mental health  Taking lansoprozole prn for spicy foods.  No recent gout.      OBJECTIVE:Blood pressure 120/74, pulse 73, temperature 96.8  F (36  C), temperature source Tympanic, resp. rate 17, height 1.797 m (5' 10.75\"), weight 128.5 kg (283 lb 6.4 oz), SpO2 95 %. BMI=Body mass index is 39.81 kg/m .  GENERAL APPEARANCE ADULT: Alert, no acute distress, obese  NECK: No adenopathy,masses or thyromegaly  RESP: lungs clear to auscultation   CV: normal rate, regular rhythm, no murmur or gallop  ABDOMEN: soft, no organomegaly, masses or tenderness  MS: extremities normal, no peripheral edema   Foot exam:no exam     ASSESSMENT/PLAN:                                                    Lifestyle recommendations:regular exercise, at least 150 minutes per week (average 30 minutes 5 times a week)  keep working on losing weight (ideal BMI-body mass index is <25)  Diabetic recommendations:-return for follow-up visit in 6 month(s)    (E11.9) Type 2 diabetes mellitus without complication, without long-term current use of insulin (H)  (primary encounter diagnosis)  Comment: doing well  Plan: Hemoglobin A1c        No change in current treatment plan.     Recheck in 6 months.     (G47.33) MADDY (obstructive sleep apnea)  Comment:   Plan: follow-up with sleep clinic as planned.     (I63.10) Cerebrovascular accident (CVA) due to embolism of precerebral artery (H)  Comment:   Plan: clopidogrel (PLAVIX) 75 MG tablet        Continue Plavix.     (I10) Hypertension goal BP (blood " pressure) < 130/80  Comment: doing well  Plan: No change in current treatment plan.     (E78.5) Hyperlipidemia LDL goal <100  Comment: has been well controlled  Plan: REfills when needed.  No change in current treatment plan.     (K21.9) Gastroesophageal reflux disease without esophagitis  Comment: takes occasional lansoprozole   Plan: LANsoprazole (PREVACID SOLUTAB) 30 MG ODT        REfills as needed.        Diabetes Type II, A1c Controlled, non-insulin dependent   Associated with the following complications:none

## 2020-01-17 NOTE — NURSING NOTE
"No chief complaint on file.      Initial /74 (BP Location: Right arm, Patient Position: Chair, Cuff Size: Adult Large)   Pulse 73   Temp 96.8  F (36  C) (Tympanic)   Resp 17   Ht 1.797 m (5' 10.75\")   Wt 128.5 kg (283 lb 6.4 oz)   SpO2 95%   BMI 39.81 kg/m   Estimated body mass index is 39.81 kg/m  as calculated from the following:    Height as of this encounter: 1.797 m (5' 10.75\").    Weight as of this encounter: 128.5 kg (283 lb 6.4 oz).    Patient presents to the clinic using No DME    Health Maintenance that is potentially due pending provider review:  Eye exam    Pt will schedule eye appt.    Is there anyone who you would like to be able to receive your results? No  If yes have patient fill out MIRNA  Argentina Olmos CMA      "

## 2020-02-07 ENCOUNTER — TRANSFERRED RECORDS (OUTPATIENT)
Dept: HEALTH INFORMATION MANAGEMENT | Facility: CLINIC | Age: 55
End: 2020-02-07

## 2020-02-07 LAB — RETINOPATHY: NEGATIVE

## 2020-05-31 DIAGNOSIS — M10.9 ACUTE GOUT OF RIGHT ANKLE, UNSPECIFIED CAUSE: ICD-10-CM

## 2020-06-03 RX ORDER — INDOMETHACIN 50 MG/1
CAPSULE ORAL
Qty: 30 CAPSULE | Refills: 3 | Status: SHIPPED | OUTPATIENT
Start: 2020-06-03 | End: 2021-11-04

## 2020-07-01 DIAGNOSIS — M10.9 ACUTE GOUT OF RIGHT ANKLE, UNSPECIFIED CAUSE: ICD-10-CM

## 2020-07-01 DIAGNOSIS — E11.9 TYPE 2 DIABETES MELLITUS WITHOUT COMPLICATION, WITHOUT LONG-TERM CURRENT USE OF INSULIN (H): Primary | ICD-10-CM

## 2020-07-01 DIAGNOSIS — I63.429 CEREBRAL INFARCTION DUE TO EMBOLISM OF ANTERIOR CEREBRAL ARTERY, UNSPECIFIED BLOOD VESSEL LATERALITY (H): ICD-10-CM

## 2020-07-02 NOTE — TELEPHONE ENCOUNTER
"Routing refill request to provider for review/approval because:  Labs not current   Last OV 1/17/2020 with Dr. Irizarry: Return in about 6 months (around 7/17/2020).   Pt scheduled with diabetic ed 7/14/2020.    Requested Prescriptions   Pending Prescriptions Disp Refills     allopurinol (ZYLOPRIM) 100 MG tablet [Pharmacy Med Name: Allopurinol 100 MG Oral Tablet] 90 tablet 0     Sig: Take 1 tablet by mouth once daily       Gout Agents Protocol Failed - 7/1/2020  9:19 AM        Failed - CBC on file in past 12 months     Recent Labs   Lab Test 04/17/19  0955   WBC 5.4   RBC 4.45   HGB 13.3   HCT 38.4*                    Failed - ALT on file in past 12 months     Recent Labs   Lab Test 04/17/19  0955   ALT 36             Failed - Normal serum creatinine on file in the past 12 months     Recent Labs   Lab Test 04/17/19  0955   CR 0.82       Ok to refill medication if creatinine is low          Passed - Has Uric Acid on file in past 12 months and value is less than 6     Recent Labs   Lab Test 07/17/19  0756   URIC 5.6     If level is 6mg/dL or greater, ok to refill one time and refer to provider.           Passed - Recent (12 mo) or future (30 days) visit within the authorizing provider's specialty     Patient has had an office visit with the authorizing provider or a provider within the authorizing providers department within the previous 12 mos or has a future within next 30 days. See \"Patient Info\" tab in inbasket, or \"Choose Columns\" in Meds & Orders section of the refill encounter.              Passed - Medication is active on med list        Passed - Patient is age 18 or older           lisinopril (ZESTRIL) 2.5 MG tablet [Pharmacy Med Name: Lisinopril 2.5 MG Oral Tablet] 90 tablet 0     Sig: Take 1 tablet by mouth once daily       ACE Inhibitors (Including Combos) Protocol Failed - 7/1/2020  9:19 AM        Failed - Normal serum creatinine on file in past 12 months     Recent Labs   Lab Test 04/17/19  0955   CR " "0.82       Ok to refill medication if creatinine is low          Failed - Normal serum potassium on file in past 12 months     Recent Labs   Lab Test 04/17/19  0955   POTASSIUM 4.4             Passed - Blood pressure under 140/90 in past 12 months     BP Readings from Last 3 Encounters:   01/17/20 120/74   09/11/19 120/76   07/17/19 124/78                 Passed - Recent (12 mo) or future (30 days) visit within the authorizing provider's specialty     Patient has had an office visit with the authorizing provider or a provider within the authorizing providers department within the previous 12 mos or has a future within next 30 days. See \"Patient Info\" tab in inbasket, or \"Choose Columns\" in Meds & Orders section of the refill encounter.              Passed - Medication is active on med list        Passed - Patient is age 18 or older           Xochitl HAWK RN, BSN      "

## 2020-07-02 NOTE — TELEPHONE ENCOUNTER
Future cbc, cmp, uric acid, a1c, then refill x 1 mo and notify patient, can do labs in next month.

## 2020-07-03 DIAGNOSIS — I63.429 CEREBRAL INFARCTION DUE TO EMBOLISM OF ANTERIOR CEREBRAL ARTERY, UNSPECIFIED BLOOD VESSEL LATERALITY (H): ICD-10-CM

## 2020-07-03 DIAGNOSIS — M10.9 ACUTE GOUT OF RIGHT ANKLE, UNSPECIFIED CAUSE: ICD-10-CM

## 2020-07-03 DIAGNOSIS — E78.5 HYPERLIPIDEMIA LDL GOAL <100: Primary | ICD-10-CM

## 2020-07-03 DIAGNOSIS — E11.9 TYPE 2 DIABETES MELLITUS WITHOUT COMPLICATION, WITHOUT LONG-TERM CURRENT USE OF INSULIN (H): ICD-10-CM

## 2020-07-03 RX ORDER — LISINOPRIL 2.5 MG/1
TABLET ORAL
Qty: 90 TABLET | Refills: 0 | Status: SHIPPED | OUTPATIENT
Start: 2020-07-03 | End: 2020-10-23

## 2020-07-03 RX ORDER — ALLOPURINOL 100 MG/1
TABLET ORAL
Qty: 90 TABLET | Refills: 0 | Status: SHIPPED | OUTPATIENT
Start: 2020-07-03 | End: 2020-10-23

## 2020-07-03 RX ORDER — ALLOPURINOL 100 MG/1
100 TABLET ORAL DAILY
Qty: 30 TABLET | Refills: 0 | Status: SHIPPED | OUTPATIENT
Start: 2020-07-03 | End: 2020-07-03

## 2020-07-03 RX ORDER — LISINOPRIL 2.5 MG/1
2.5 TABLET ORAL DAILY
Qty: 30 TABLET | Refills: 0 | Status: SHIPPED | OUTPATIENT
Start: 2020-07-03 | End: 2020-07-03

## 2020-08-24 DIAGNOSIS — E78.5 HYPERLIPIDEMIA LDL GOAL <100: ICD-10-CM

## 2020-08-25 RX ORDER — ATORVASTATIN CALCIUM 20 MG/1
20 TABLET, FILM COATED ORAL DAILY
Qty: 90 TABLET | Refills: 0 | Status: SHIPPED | OUTPATIENT
Start: 2020-08-25 | End: 2020-08-26

## 2020-08-26 DIAGNOSIS — E78.5 HYPERLIPIDEMIA LDL GOAL <100: ICD-10-CM

## 2020-08-26 RX ORDER — ATORVASTATIN CALCIUM 20 MG/1
TABLET, FILM COATED ORAL
Qty: 90 TABLET | Refills: 0 | Status: SHIPPED | OUTPATIENT
Start: 2020-08-26 | End: 2020-08-28

## 2020-08-27 DIAGNOSIS — E78.5 HYPERLIPIDEMIA LDL GOAL <100: ICD-10-CM

## 2020-08-27 RX ORDER — ATORVASTATIN CALCIUM 20 MG/1
TABLET, FILM COATED ORAL
Qty: 90 TABLET | Refills: 0 | OUTPATIENT
Start: 2020-08-27

## 2020-08-28 DIAGNOSIS — E78.5 HYPERLIPIDEMIA LDL GOAL <100: ICD-10-CM

## 2020-08-28 RX ORDER — ATORVASTATIN CALCIUM 20 MG/1
TABLET, FILM COATED ORAL
Qty: 90 TABLET | Refills: 0 | Status: SHIPPED | OUTPATIENT
Start: 2020-08-28 | End: 2020-10-23

## 2020-08-28 NOTE — TELEPHONE ENCOUNTER
Lab Results   Component Value Date    CHOL 136 07/17/2019     Lab Results   Component Value Date    HDL 57 07/17/2019     Lab Results   Component Value Date    LDL 42 07/17/2019     Lab Results   Component Value Date    TRIG 185 07/17/2019     Lab Results   Component Value Date    CHOLHDLRATIO 3.1 01/22/2015     Per protocol, can not refill for #90.  Last seen for diabetic check January 2020.  I sent My Chart message stating needs appointment.  Argentina Garcia RN

## 2020-10-23 ENCOUNTER — OFFICE VISIT (OUTPATIENT)
Dept: FAMILY MEDICINE | Facility: CLINIC | Age: 55
End: 2020-10-23
Payer: COMMERCIAL

## 2020-10-23 VITALS
SYSTOLIC BLOOD PRESSURE: 132 MMHG | DIASTOLIC BLOOD PRESSURE: 76 MMHG | RESPIRATION RATE: 20 BRPM | BODY MASS INDEX: 40.74 KG/M2 | TEMPERATURE: 97.8 F | OXYGEN SATURATION: 98 % | HEART RATE: 65 BPM | WEIGHT: 291 LBS | HEIGHT: 71 IN

## 2020-10-23 DIAGNOSIS — E66.01 MORBID OBESITY (H): ICD-10-CM

## 2020-10-23 DIAGNOSIS — E78.5 HYPERLIPIDEMIA LDL GOAL <100: ICD-10-CM

## 2020-10-23 DIAGNOSIS — I10 ESSENTIAL HYPERTENSION WITH GOAL BLOOD PRESSURE LESS THAN 140/90: ICD-10-CM

## 2020-10-23 DIAGNOSIS — I63.429 CEREBRAL INFARCTION DUE TO EMBOLISM OF ANTERIOR CEREBRAL ARTERY, UNSPECIFIED BLOOD VESSEL LATERALITY (H): ICD-10-CM

## 2020-10-23 DIAGNOSIS — E11.9 TYPE 2 DIABETES MELLITUS WITHOUT COMPLICATION, WITHOUT LONG-TERM CURRENT USE OF INSULIN (H): Primary | ICD-10-CM

## 2020-10-23 DIAGNOSIS — M10.9 ACUTE GOUT OF RIGHT ANKLE, UNSPECIFIED CAUSE: ICD-10-CM

## 2020-10-23 DIAGNOSIS — Z23 NEED FOR TDAP VACCINATION: ICD-10-CM

## 2020-10-23 DIAGNOSIS — M1A.0710 CHRONIC GOUT OF RIGHT ANKLE, UNSPECIFIED CAUSE: ICD-10-CM

## 2020-10-23 LAB
ANION GAP SERPL CALCULATED.3IONS-SCNC: 6 MMOL/L (ref 3–14)
BUN SERPL-MCNC: 12 MG/DL (ref 7–30)
CALCIUM SERPL-MCNC: 9 MG/DL (ref 8.5–10.1)
CHLORIDE SERPL-SCNC: 106 MMOL/L (ref 94–109)
CO2 SERPL-SCNC: 28 MMOL/L (ref 20–32)
CREAT SERPL-MCNC: 0.84 MG/DL (ref 0.66–1.25)
CREAT UR-MCNC: 213 MG/DL
GFR SERPL CREATININE-BSD FRML MDRD: >90 ML/MIN/{1.73_M2}
GLUCOSE SERPL-MCNC: 147 MG/DL (ref 70–99)
HBA1C MFR BLD: 6.3 % (ref 0–5.6)
MICROALBUMIN UR-MCNC: 24 MG/L
MICROALBUMIN/CREAT UR: 11.17 MG/G CR (ref 0–17)
POTASSIUM SERPL-SCNC: 4 MMOL/L (ref 3.4–5.3)
SODIUM SERPL-SCNC: 140 MMOL/L (ref 133–144)
URATE SERPL-MCNC: 6.2 MG/DL (ref 3.5–7.2)

## 2020-10-23 PROCEDURE — 99207 PR FOOT EXAM NO CHARGE: CPT | Performed by: FAMILY MEDICINE

## 2020-10-23 PROCEDURE — 90471 IMMUNIZATION ADMIN: CPT | Performed by: FAMILY MEDICINE

## 2020-10-23 PROCEDURE — 83036 HEMOGLOBIN GLYCOSYLATED A1C: CPT | Performed by: FAMILY MEDICINE

## 2020-10-23 PROCEDURE — 84550 ASSAY OF BLOOD/URIC ACID: CPT | Performed by: FAMILY MEDICINE

## 2020-10-23 PROCEDURE — 80061 LIPID PANEL: CPT | Performed by: FAMILY MEDICINE

## 2020-10-23 PROCEDURE — 99214 OFFICE O/P EST MOD 30 MIN: CPT | Mod: 25 | Performed by: FAMILY MEDICINE

## 2020-10-23 PROCEDURE — 80048 BASIC METABOLIC PNL TOTAL CA: CPT | Performed by: FAMILY MEDICINE

## 2020-10-23 PROCEDURE — 82043 UR ALBUMIN QUANTITATIVE: CPT | Performed by: FAMILY MEDICINE

## 2020-10-23 PROCEDURE — 90715 TDAP VACCINE 7 YRS/> IM: CPT | Performed by: FAMILY MEDICINE

## 2020-10-23 PROCEDURE — 36415 COLL VENOUS BLD VENIPUNCTURE: CPT | Performed by: FAMILY MEDICINE

## 2020-10-23 RX ORDER — LISINOPRIL 2.5 MG/1
2.5 TABLET ORAL DAILY
Qty: 90 TABLET | Refills: 3 | Status: SHIPPED | OUTPATIENT
Start: 2020-10-23 | End: 2021-11-04

## 2020-10-23 RX ORDER — ALLOPURINOL 100 MG/1
100 TABLET ORAL DAILY
Qty: 90 TABLET | Refills: 3 | Status: SHIPPED | OUTPATIENT
Start: 2020-10-23 | End: 2020-10-24

## 2020-10-23 RX ORDER — ATORVASTATIN CALCIUM 20 MG/1
20 TABLET, FILM COATED ORAL DAILY
Qty: 90 TABLET | Refills: 3 | Status: SHIPPED | OUTPATIENT
Start: 2020-10-23 | End: 2021-11-04

## 2020-10-23 ASSESSMENT — PATIENT HEALTH QUESTIONNAIRE - PHQ9
5. POOR APPETITE OR OVEREATING: NOT AT ALL
SUM OF ALL RESPONSES TO PHQ QUESTIONS 1-9: 1

## 2020-10-23 ASSESSMENT — ANXIETY QUESTIONNAIRES
6. BECOMING EASILY ANNOYED OR IRRITABLE: NOT AT ALL
7. FEELING AFRAID AS IF SOMETHING AWFUL MIGHT HAPPEN: NOT AT ALL
5. BEING SO RESTLESS THAT IT IS HARD TO SIT STILL: NOT AT ALL
2. NOT BEING ABLE TO STOP OR CONTROL WORRYING: NOT AT ALL
3. WORRYING TOO MUCH ABOUT DIFFERENT THINGS: NOT AT ALL
GAD7 TOTAL SCORE: 0
1. FEELING NERVOUS, ANXIOUS, OR ON EDGE: NOT AT ALL

## 2020-10-23 ASSESSMENT — MIFFLIN-ST. JEOR: SCORE: 2183.13

## 2020-10-23 NOTE — NURSING NOTE
"Chief Complaint   Patient presents with     Hypertension     Lipids       Initial /76   Pulse 65   Temp 97.8  F (36.6  C) (Tympanic)   Resp 20   Ht 1.797 m (5' 10.75\")   Wt 132 kg (291 lb)   SpO2 98%   BMI 40.87 kg/m   Estimated body mass index is 40.87 kg/m  as calculated from the following:    Height as of this encounter: 1.797 m (5' 10.75\").    Weight as of this encounter: 132 kg (291 lb).    Patient presents to the clinic using No DME    Health Maintenance that is potentially due pending provider review:  NONE    n/a    Is there anyone who you would like to be able to receive your results? No  If yes have patient fill out MIRNA      "

## 2020-10-23 NOTE — PROGRESS NOTES
SUBJECTIVE: Lyle Tenorio is a 55 year old male  Chief Complaint   Patient presents with     Hypertension     Lipids      In for diabetes mellitus check.   Needs refills.     Diabetes Follow-up      How often are you checking your blood sugar? Not at all    Currently not on medication for diabetes mellitus.      Has not been checking glucometers for the past 4 months.     What concerns do you have today about your diabetes? None     Do you have any of these symptoms? (Select all that apply)  No numbness or tingling in feet.  No redness, sores or blisters on feet.  No complaints of excessive thirst.  No reports of blurry vision.  No significant changes to weight.      BP Readings from Last 2 Encounters:   10/23/20 132/76   01/17/20 120/74     Hemoglobin A1C (%)   Date Value   01/17/2020 6.2 (H)   07/17/2019 6.0 (H)     LDL Cholesterol Calculated (mg/dL)   Date Value   07/17/2019 42   03/26/2018 52               Hyperlipidemia Follow-Up      Are you regularly taking any medication or supplement to lower your cholesterol?   Yes- Atorvastatin    Are you having muscle aches or other side effects that you think could be caused by your cholesterol lowering medication?  No      How many servings of fruits and vegetables do you eat daily?  0-1    On average, how many sweetened beverages do you drink each day (Examples: soda, juice, sweet tea, etc.  Do NOT count diet or artificially sweetened beverages)?   0    How many days per week do you exercise enough to make your heart beat faster? None    How many minutes a day do you exercise enough to make your heart beat faster? NA  How many days per week do you miss taking your medication? 1    What makes it hard for you to take your medications?  remembering to take    Last clinic visit:1/17/2020  Medication or other changes at last visit:none  Weight since last visit?  Up 1#  Wt Readings from Last 5 Encounters:   10/23/20 132 kg (291 lb)   01/17/20 128.5 kg (283 lb 6.4 oz)    01/07/20 131.6 kg (290 lb 3.2 oz)   09/11/19 125.6 kg (277 lb)   07/17/19 124.3 kg (274 lb)      History   Smoking Status     Former Smoker     Packs/day: 1.00     Years: 25.00     Types: Cigarettes     Quit date: 3/28/2010   Smokeless Tobacco     Never Used       Past medical history reviewed and updated    Patient Active Problem List    Diagnosis Date Noted     Type 2 diabetes mellitus without complication, without long-term current use of insulin (H) 07/17/2019     Priority: Medium     Chronic gout of right ankle, unspecified cause 07/17/2019     Priority: Medium     Morbid obesity (H) 04/17/2019     Priority: Medium     Generalized anxiety disorder 03/06/2013     Priority: Medium     Diagnosis updated by automated process. Provider to review and confirm.       Major depression in complete remission (H) 03/04/2013     Priority: Medium     MADDY (obstructive sleep apnea) 01/22/2013     Priority: Medium     Moderate MADDY (12/12/2012 with AHI 22.7, anisha desat 86%)  1/17/2020:Seen yearly by sleep clinic.  Uses CPAP nightly.        CVA (cerebral vascular accident) (H) 11/08/2012     Priority: Medium     2012 stroke.  He had right sided weakness, slurred speech and some vision changes.  He had no residual symptoms.   Was out of work for nearly 8 months.    4/13/2012 neurology notes:3 small areas of acute infarction, embolic, in the posterior right border zone area, with small segment of high grade stenosis in the distal right MCA branch vessel in the posterior aspect of the right sylvian fissure. No embolic source found. No coagulation abnormality found on initial screen.         Hypertension goal BP (blood pressure) < 130/80 06/08/2012     Priority: Medium     Cerebral embolism with cerebral infarction (H) 03/29/2012     Priority: Medium     Hyperlipidemia LDL goal <100 03/29/2012     Priority: Medium     Advanced care planning/counseling discussion 03/28/2012     Priority: Low     Worksheet given and will bring copy  in when complete, will call if has questions           Current Outpatient Medications   Medication Sig Dispense Refill     allopurinol (ZYLOPRIM) 100 MG tablet Take 1 tablet by mouth once daily 90 tablet 0     Ascorbic Acid (VITAMIN C PO) Take 1,000 mg by mouth        ASPIRIN NOT PRESCRIBED, INTENTIONAL, Antiplatelet medication not prescribed intentionally due to Plavix  0     atorvastatin (LIPITOR) 20 MG tablet Take 1 tablet by mouth once daily 90 tablet 0     blood glucose (ACCU-CHEK GUIDE) test strip Use to test blood sugar 3 times daily or as directed. 100 each 11     blood glucose monitoring (ACCU-CHEK FASTCLIX) lancets Use to test blood sugar 3 times daily or as directed.  Ok to substitute alternative if insurance prefers. 102 each 11     calcium carbonate (TUMS) 500 MG chewable tablet Take 1 tablet by mouth 2 times daily. 180 chew tab 3     Cholecalciferol (VITAMIN D) 2000 UNITS tablet Take 2,000 Units by mouth daily. (Patient taking differently: Take 1,000 Units by mouth daily ) 100 tablet 3     clopidogrel (PLAVIX) 75 MG tablet Take 1 tablet (75 mg) by mouth daily 90 tablet 3     cyanocobalamin (B-12) 1000 MCG TBCR Take 1,000 mcg by mouth daily. 100 tablet 1     IBUPROFEN PO Take 400-600 mg by mouth as needed.         indomethacin (INDOCIN) 50 MG capsule TAKE 1 CAPSULE BY MOUTH THREE TIMES DAILY WITH MEALS 30 capsule 3     LANsoprazole (PREVACID SOLUTAB) 30 MG ODT Take 1 tablet (30 mg) by mouth daily 90 tablet 3     lisinopril (ZESTRIL) 2.5 MG tablet Take 1 tablet by mouth once daily 90 tablet 0     order for DME  HEATED HUMIDITY   CHIN STRAP   NON-DSPOSABLE FILTERS   DISPOSABLE FILTERS (2 PER 1 MONTHS)   NASAL PILLOW (2 PER 1 MONTHS)   FULL FACE MASK  (1 PER 3 MONTHS)   FULL FACE CUSHION  (1 PER 1 MONTHS)   HUMIDIFIER CHAMBER (1 PER 6 MONTHS)   MASK (1 PER 3 MONTHS)   NASAL CUSHION (2 PER 1 MONTHS)   HEADGEAR (1 PER 6 MONTHS)   TUBING (1 PER 3  "MONTHS)   HEATED TUBING (1 PER 3 MONTHS) 1 Device 1     ORDER FOR DME Pt was set up on a [employer] RemStar 60 Series Auto AFlex 5-15 cm H2O with heated humidity and a modem. Pt chose a Mirage FX nasal mask standard size.       mupirocin (BACTROBAN) 2 % external ointment Apply topically 3 times daily 10 days for skin sores. 22 g 0     ROS:General: No change in weight, sleep or appetite.  Normal energy.  No fever or chills  Using CPAP nightly.   Resp: No coughing, wheezing or shortness of breath  CV: No chest pains or palpitations  GI: No nausea, vomiting,  heartburn, abdominal pain, diarrhea, constipation or change in bowel habits  : No urinary frequency or dysuria, bladder or kidney problems  Musculoskeletal: No significant muscle or joint pains.  occasional gout.  Joint pain in ankle a few times year.  incision and drainage helps.   Psychiatric: No problems with anxiety, depression or mental health  PHQ9 score today= 1  generalized anxiety disorder scale score today= 0    OBJECTIVE:Blood pressure 132/76, pulse 65, temperature 97.8  F (36.6  C), temperature source Tympanic, resp. rate 20, height 1.797 m (5' 10.75\"), weight 132 kg (291 lb), SpO2 98 %. BMI=Body mass index is 40.87 kg/m .  GENERAL APPEARANCE ADULT: Alert, no acute distress, morbidly obese  NECK: No adenopathy,masses or thyromegaly  RESP: lungs clear to auscultation   CV: normal rate, regular rhythm, no murmur or gallop  ABDOMEN: soft, no organomegaly, masses or tenderness  MS: extremities normal, no peripheral edema   Foot exam:normal DP and PT pulses, no trophic changes or ulcerative lesions and normal monofilament exam    ASSESSMENT/PLAN:                                                    Lifestyle recommendations:regular exercise, at least 150 minutes per week (average 30 minutes 5 times a week)  keep working on losing weight (ideal BMI-body mass index is <25)  Diabetic recommendations:-return for follow-up visit in 6 month(s)    (E11.9) " Type 2 diabetes mellitus without complication, without long-term current use of insulin (H)  (primary encounter diagnosis)  Comment: due for follow-up   Plan: Hemoglobin A1c, Albumin Random Urine         Quantitative with Creat Ratio, FOOT EXAM        Hgb A1C today.  If OK, recheck in 6 months.  If changes made, recheck in 3 months.     (M10.9) Acute gout of right ankle, unspecified cause  Comment: occasional episode  Plan: allopurinol (ZYLOPRIM) 100 MG tablet, Uric acid        Check uric acid gout test.  If over 5-6  We could increase the allopurinol dose.     (E78.5) Hyperlipidemia LDL goal <100  Comment: due for recheck  Plan: atorvastatin (LIPITOR) 20 MG tablet, Lipid         panel reflex to direct LDL Fasting        Fasting blood  Tests today.  REfills.     (I63.429) Cerebral infarction due to embolism of anterior cerebral artery, unspecified blood vessel laterality (H)  Comment:   Plan: lisinopril (ZESTRIL) 2.5 MG tablet          (E66.01) Morbid obesity (H)  Comment:   Plan: Keep working on weight.     (I10) Essential hypertension with goal blood pressure less than 140/90  Comment: doing well  Plan: Basic metabolic panel  (Ca, Cl, CO2, Creat,         Gluc, K, Na, BUN)        No change in current treatment plan.   Refills.      Lab Results   Component Value Date    A1C 6.3 10/23/2020        Diabetes Type II, A1c Controlled, non-insulin dependent   Associated with the following complications:none

## 2020-10-23 NOTE — PATIENT INSTRUCTIONS
ASSESSMENT/PLAN:                                                    Lifestyle recommendations:regular exercise, at least 150 minutes per week (average 30 minutes 5 times a week)  keep working on losing weight (ideal BMI-body mass index is <25)  Diabetic recommendations:-return for follow-up visit in 6 month(s)    (E11.9) Type 2 diabetes mellitus without complication, without long-term current use of insulin (H)  (primary encounter diagnosis)  Comment: due for follow-up   Plan: Hemoglobin A1c, Albumin Random Urine         Quantitative with Creat Ratio, FOOT EXAM        Hgb A1C today.  If OK, recheck in 6 months.  If changes made, recheck in 3 months.     (M10.9) Acute gout of right ankle, unspecified cause  Comment: occasional episode  Plan: allopurinol (ZYLOPRIM) 100 MG tablet, Uric acid        Check uric acid gout test.  If over 5-6  We could increase the allopurinol dose.     (E78.5) Hyperlipidemia LDL goal <100  Comment: due for recheck  Plan: atorvastatin (LIPITOR) 20 MG tablet, Lipid         panel reflex to direct LDL Fasting        Fasting blood  Tests today.  REfills.     (I63.429) Cerebral infarction due to embolism of anterior cerebral artery, unspecified blood vessel laterality (H)  Comment:   Plan: lisinopril (ZESTRIL) 2.5 MG tablet          (E66.01) Morbid obesity (H)  Comment:   Plan: Keep working on weight.     (I10) Essential hypertension with goal blood pressure less than 140/90  Comment: doing well  Plan: Basic metabolic panel  (Ca, Cl, CO2, Creat,         Gluc, K, Na, BUN)        No change in current treatment plan.   Refills.

## 2020-10-24 DIAGNOSIS — M10.9 ACUTE GOUT OF RIGHT ANKLE, UNSPECIFIED CAUSE: ICD-10-CM

## 2020-10-24 LAB
CHOLEST SERPL-MCNC: 155 MG/DL
HDLC SERPL-MCNC: 62 MG/DL
LDLC SERPL CALC-MCNC: 54 MG/DL
NONHDLC SERPL-MCNC: 93 MG/DL
TRIGL SERPL-MCNC: 195 MG/DL

## 2020-10-24 RX ORDER — ALLOPURINOL 100 MG/1
200 TABLET ORAL DAILY
Qty: 180 TABLET | Refills: 3 | Status: SHIPPED | OUTPATIENT
Start: 2020-10-24 | End: 2021-11-04

## 2020-10-24 ASSESSMENT — ANXIETY QUESTIONNAIRES: GAD7 TOTAL SCORE: 0

## 2020-10-24 NOTE — RESULT ENCOUNTER NOTE
Leroy Alvarenga,   Microalbumin urine test is normal indicating no increased amount of protein in urine. This is a measure of kidney function.    Triglycerides, a type of fat found in the bloodstream is high.  Other lipids are all normal.   The blood chemistries (Basic metabolic panel) are all normal including electrolytes (salt balances in the blood) and kidney tests with the exception of glucose which is high.     Uric acid gout test is a little high-we prefer under 5-6 to prevent gout.   HgbA1C is OK indicating reasonable diabetic control.   PLAN: You have been on allopurinol 100mg daily.  I suggest increasing to 200mg daily.   You can take two of the 100mg pills.  I will send in new prescription.   SAVI RAUSCH MD

## 2021-02-08 ENCOUNTER — TRANSFERRED RECORDS (OUTPATIENT)
Dept: HEALTH INFORMATION MANAGEMENT | Facility: CLINIC | Age: 56
End: 2021-02-08

## 2021-02-08 LAB — RETINOPATHY: NEGATIVE

## 2021-02-11 DIAGNOSIS — G47.33 OBSTRUCTIVE SLEEP APNEA (ADULT) (PEDIATRIC): Primary | ICD-10-CM

## 2021-03-09 DIAGNOSIS — I63.10 CEREBROVASCULAR ACCIDENT (CVA) DUE TO EMBOLISM OF PRECEREBRAL ARTERY (H): ICD-10-CM

## 2021-03-10 RX ORDER — CLOPIDOGREL BISULFATE 75 MG/1
TABLET ORAL
Qty: 90 TABLET | Refills: 0 | Status: SHIPPED | OUTPATIENT
Start: 2021-03-10 | End: 2021-03-16

## 2021-03-10 NOTE — TELEPHONE ENCOUNTER
"Requested Prescriptions   Pending Prescriptions Disp Refills     clopidogrel (PLAVIX) 75 MG tablet [Pharmacy Med Name: Clopidogrel Bisulfate 75 MG Oral Tablet] 90 tablet 0     Sig: Take 1 tablet by mouth once daily       Plavix Failed - 3/9/2021  9:24 AM        Failed - No active PPI on record unless is Protonix        Failed - Normal HGB on file in past 12 months     Recent Labs   Lab Test 04/17/19  0955   HGB 13.3               Failed - Normal Platelets on file in past 12 months     Recent Labs   Lab Test 04/17/19  0955                  Passed - Recent (12 mo) or future (30 days) visit within the authorizing provider's specialty     Patient has had an office visit with the authorizing provider or a provider within the authorizing providers department within the previous 12 mos or has a future within next 30 days. See \"Patient Info\" tab in inbasket, or \"Choose Columns\" in Meds & Orders section of the refill encounter.              Passed - Medication is active on med list        Passed - Patient is age 18 or older           Marley HAWK RN    "

## 2021-03-13 DIAGNOSIS — I63.10 CEREBROVASCULAR ACCIDENT (CVA) DUE TO EMBOLISM OF PRECEREBRAL ARTERY (H): ICD-10-CM

## 2021-03-16 RX ORDER — CLOPIDOGREL BISULFATE 75 MG/1
TABLET ORAL
Qty: 90 TABLET | Refills: 0 | Status: SHIPPED | OUTPATIENT
Start: 2021-03-16 | End: 2021-06-02

## 2021-03-16 NOTE — TELEPHONE ENCOUNTER
"Requested Prescriptions   Pending Prescriptions Disp Refills     clopidogrel (PLAVIX) 75 MG tablet [Pharmacy Med Name: Clopidogrel Bisulfate 75 MG Oral Tablet] 90 tablet 0     Sig: Take 1 tablet by mouth once daily       Plavix Failed - 3/13/2021 11:07 AM        Failed - No active PPI on record unless is Protonix        Failed - Normal HGB on file in past 12 months     Recent Labs   Lab Test 04/17/19  0955   HGB 13.3               Failed - Normal Platelets on file in past 12 months     Recent Labs   Lab Test 04/17/19  0955                  Passed - Recent (12 mo) or future (30 days) visit within the authorizing provider's specialty     Patient has had an office visit with the authorizing provider or a provider within the authorizing providers department within the previous 12 mos or has a future within next 30 days. See \"Patient Info\" tab in inbasket, or \"Choose Columns\" in Meds & Orders section of the refill encounter.              Passed - Medication is active on med list        Passed - Patient is age 18 or older             "

## 2021-05-11 DIAGNOSIS — K21.9 GASTROESOPHAGEAL REFLUX DISEASE WITHOUT ESOPHAGITIS: ICD-10-CM

## 2021-05-11 RX ORDER — LANSOPRAZOLE 30 MG/1
TABLET, ORALLY DISINTEGRATING, DELAYED RELEASE ORAL
Qty: 90 TABLET | Refills: 0 | Status: SHIPPED | OUTPATIENT
Start: 2021-05-11 | End: 2021-08-12

## 2021-05-11 NOTE — TELEPHONE ENCOUNTER
"Requested Prescriptions   Pending Prescriptions Disp Refills     LANsoprazole (PREVACID SOLUTAB) 30 MG ODT [Pharmacy Med Name: Lansoprazole 30 MG Oral Tablet Disintegrating] 90 tablet 0     Sig: DISSOLVE 1 TABLET BY MOUTH ONCE DAILY       PPI Protocol Failed - 5/11/2021  7:47 AM        Failed - Not on Clopidogrel (unless Pantoprazole ordered)        Passed - No diagnosis of osteoporosis on record        Passed - Recent (12 mo) or future (30 days) visit within the authorizing provider's specialty     Patient has had an office visit with the authorizing provider or a provider within the authorizing providers department within the previous 12 mos or has a future within next 30 days. See \"Patient Info\" tab in inbasket, or \"Choose Columns\" in Meds & Orders section of the refill encounter.              Passed - Medication is active on med list        Passed - Patient is age 18 or older         Marley HAWK RN    "

## 2021-06-01 DIAGNOSIS — I63.10 CEREBROVASCULAR ACCIDENT (CVA) DUE TO EMBOLISM OF PRECEREBRAL ARTERY (H): ICD-10-CM

## 2021-06-01 NOTE — TELEPHONE ENCOUNTER
"Requested Prescriptions   Pending Prescriptions Disp Refills     clopidogrel (PLAVIX) 75 MG tablet [Pharmacy Med Name: Clopidogrel Bisulfate 75 MG Oral Tablet] 90 tablet 0     Sig: Take 1 tablet by mouth once daily       Plavix Failed - 6/1/2021 12:18 PM        Failed - No active PPI on record unless is Protonix        Failed - Normal HGB on file in past 12 months     Recent Labs   Lab Test 04/17/19  0955   HGB 13.3               Failed - Normal Platelets on file in past 12 months     Recent Labs   Lab Test 04/17/19  0955                  Passed - Recent (12 mo) or future (30 days) visit within the authorizing provider's specialty     Patient has had an office visit with the authorizing provider or a provider within the authorizing providers department within the previous 12 mos or has a future within next 30 days. See \"Patient Info\" tab in inbasket, or \"Choose Columns\" in Meds & Orders section of the refill encounter.              Passed - Medication is active on med list        Passed - Patient is age 18 or older             "

## 2021-06-02 RX ORDER — CLOPIDOGREL BISULFATE 75 MG/1
TABLET ORAL
Qty: 90 TABLET | Refills: 0 | Status: SHIPPED | OUTPATIENT
Start: 2021-06-02 | End: 2021-09-02

## 2021-08-11 DIAGNOSIS — K21.9 GASTROESOPHAGEAL REFLUX DISEASE WITHOUT ESOPHAGITIS: ICD-10-CM

## 2021-08-12 RX ORDER — LANSOPRAZOLE 30 MG/1
TABLET, ORALLY DISINTEGRATING, DELAYED RELEASE ORAL
Qty: 90 TABLET | Refills: 0 | Status: SHIPPED | OUTPATIENT
Start: 2021-08-12 | End: 2021-11-04

## 2021-09-01 DIAGNOSIS — I63.10 CEREBROVASCULAR ACCIDENT (CVA) DUE TO EMBOLISM OF PRECEREBRAL ARTERY (H): ICD-10-CM

## 2021-09-02 RX ORDER — CLOPIDOGREL BISULFATE 75 MG/1
75 TABLET ORAL DAILY
Qty: 30 TABLET | Refills: 0 | Status: SHIPPED | OUTPATIENT
Start: 2021-09-02 | End: 2021-10-07

## 2021-10-06 DIAGNOSIS — I63.10 CEREBROVASCULAR ACCIDENT (CVA) DUE TO EMBOLISM OF PRECEREBRAL ARTERY (H): ICD-10-CM

## 2021-10-07 RX ORDER — CLOPIDOGREL BISULFATE 75 MG/1
TABLET ORAL
Qty: 30 TABLET | Refills: 0 | Status: SHIPPED | OUTPATIENT
Start: 2021-10-07 | End: 2021-11-04

## 2021-10-09 DIAGNOSIS — I63.10 CEREBROVASCULAR ACCIDENT (CVA) DUE TO EMBOLISM OF PRECEREBRAL ARTERY (H): ICD-10-CM

## 2021-10-11 RX ORDER — CLOPIDOGREL BISULFATE 75 MG/1
TABLET ORAL
Qty: 30 TABLET | Refills: 0 | OUTPATIENT
Start: 2021-10-11

## 2021-11-04 ENCOUNTER — OFFICE VISIT (OUTPATIENT)
Dept: FAMILY MEDICINE | Facility: CLINIC | Age: 56
End: 2021-11-04
Payer: COMMERCIAL

## 2021-11-04 VITALS
HEART RATE: 70 BPM | DIASTOLIC BLOOD PRESSURE: 80 MMHG | TEMPERATURE: 97.6 F | RESPIRATION RATE: 18 BRPM | BODY MASS INDEX: 40.88 KG/M2 | HEIGHT: 71 IN | SYSTOLIC BLOOD PRESSURE: 120 MMHG | WEIGHT: 292 LBS

## 2021-11-04 DIAGNOSIS — G47.33 OSA (OBSTRUCTIVE SLEEP APNEA): ICD-10-CM

## 2021-11-04 DIAGNOSIS — Z23 COVID-19 VACCINE ADMINISTERED: ICD-10-CM

## 2021-11-04 DIAGNOSIS — I10 ESSENTIAL HYPERTENSION WITH GOAL BLOOD PRESSURE LESS THAN 140/90: ICD-10-CM

## 2021-11-04 DIAGNOSIS — K21.9 GASTROESOPHAGEAL REFLUX DISEASE WITHOUT ESOPHAGITIS: ICD-10-CM

## 2021-11-04 DIAGNOSIS — M10.9 ACUTE GOUT OF RIGHT ANKLE, UNSPECIFIED CAUSE: ICD-10-CM

## 2021-11-04 DIAGNOSIS — I63.429 CEREBRAL INFARCTION DUE TO EMBOLISM OF ANTERIOR CEREBRAL ARTERY, UNSPECIFIED BLOOD VESSEL LATERALITY (H): ICD-10-CM

## 2021-11-04 DIAGNOSIS — Z11.59 NEED FOR HEPATITIS C SCREENING TEST: ICD-10-CM

## 2021-11-04 DIAGNOSIS — E11.9 TYPE 2 DIABETES MELLITUS WITHOUT COMPLICATION, WITHOUT LONG-TERM CURRENT USE OF INSULIN (H): Primary | ICD-10-CM

## 2021-11-04 DIAGNOSIS — E78.5 HYPERLIPIDEMIA LDL GOAL <100: ICD-10-CM

## 2021-11-04 LAB
ALBUMIN SERPL-MCNC: 3.8 G/DL (ref 3.4–5)
ALP SERPL-CCNC: 72 U/L (ref 40–150)
ALT SERPL W P-5'-P-CCNC: 45 U/L (ref 0–70)
ANION GAP SERPL CALCULATED.3IONS-SCNC: 3 MMOL/L (ref 3–14)
AST SERPL W P-5'-P-CCNC: 22 U/L (ref 0–45)
BASOPHILS # BLD AUTO: 0 10E3/UL (ref 0–0.2)
BASOPHILS NFR BLD AUTO: 1 %
BILIRUB SERPL-MCNC: 0.4 MG/DL (ref 0.2–1.3)
BUN SERPL-MCNC: 16 MG/DL (ref 7–30)
CALCIUM SERPL-MCNC: 9.1 MG/DL (ref 8.5–10.1)
CHLORIDE BLD-SCNC: 107 MMOL/L (ref 94–109)
CHOLEST SERPL-MCNC: 158 MG/DL
CO2 SERPL-SCNC: 30 MMOL/L (ref 20–32)
CREAT SERPL-MCNC: 0.76 MG/DL (ref 0.66–1.25)
CREAT UR-MCNC: 129 MG/DL
EOSINOPHIL # BLD AUTO: 0.3 10E3/UL (ref 0–0.7)
EOSINOPHIL NFR BLD AUTO: 4 %
ERYTHROCYTE [DISTWIDTH] IN BLOOD BY AUTOMATED COUNT: 13.3 % (ref 10–15)
FASTING STATUS PATIENT QL REPORTED: ABNORMAL
GFR SERPL CREATININE-BSD FRML MDRD: >90 ML/MIN/1.73M2
GLUCOSE BLD-MCNC: 148 MG/DL (ref 70–99)
HBA1C MFR BLD: 6.7 % (ref 0–5.6)
HCT VFR BLD AUTO: 37.9 % (ref 40–53)
HCV AB SERPL QL IA: NONREACTIVE
HDLC SERPL-MCNC: 64 MG/DL
HGB BLD-MCNC: 12.8 G/DL (ref 13.3–17.7)
LDLC SERPL CALC-MCNC: 60 MG/DL
LYMPHOCYTES # BLD AUTO: 1.4 10E3/UL (ref 0.8–5.3)
LYMPHOCYTES NFR BLD AUTO: 21 %
MCH RBC QN AUTO: 29.4 PG (ref 26.5–33)
MCHC RBC AUTO-ENTMCNC: 33.8 G/DL (ref 31.5–36.5)
MCV RBC AUTO: 87 FL (ref 78–100)
MICROALBUMIN UR-MCNC: 17 MG/L
MICROALBUMIN/CREAT UR: 13.18 MG/G CR (ref 0–17)
MONOCYTES # BLD AUTO: 0.7 10E3/UL (ref 0–1.3)
MONOCYTES NFR BLD AUTO: 11 %
NEUTROPHILS # BLD AUTO: 4.2 10E3/UL (ref 1.6–8.3)
NEUTROPHILS NFR BLD AUTO: 64 %
NONHDLC SERPL-MCNC: 94 MG/DL
PLATELET # BLD AUTO: 195 10E3/UL (ref 150–450)
POTASSIUM BLD-SCNC: 4.7 MMOL/L (ref 3.4–5.3)
PROT SERPL-MCNC: 7.4 G/DL (ref 6.8–8.8)
RBC # BLD AUTO: 4.36 10E6/UL (ref 4.4–5.9)
SODIUM SERPL-SCNC: 140 MMOL/L (ref 133–144)
TRIGL SERPL-MCNC: 168 MG/DL
URATE SERPL-MCNC: 6.6 MG/DL (ref 3.5–7.2)
WBC # BLD AUTO: 6.5 10E3/UL (ref 4–11)

## 2021-11-04 PROCEDURE — 91301 COVID-19,PF,MODERNA (18+ YRS BOOSTER .25ML): CPT | Performed by: FAMILY MEDICINE

## 2021-11-04 PROCEDURE — 99207 PR FOOT EXAM NO CHARGE: CPT | Performed by: FAMILY MEDICINE

## 2021-11-04 PROCEDURE — 80053 COMPREHEN METABOLIC PANEL: CPT | Performed by: FAMILY MEDICINE

## 2021-11-04 PROCEDURE — 82043 UR ALBUMIN QUANTITATIVE: CPT | Performed by: FAMILY MEDICINE

## 2021-11-04 PROCEDURE — 83036 HEMOGLOBIN GLYCOSYLATED A1C: CPT | Performed by: FAMILY MEDICINE

## 2021-11-04 PROCEDURE — 85025 COMPLETE CBC W/AUTO DIFF WBC: CPT | Performed by: FAMILY MEDICINE

## 2021-11-04 PROCEDURE — 36415 COLL VENOUS BLD VENIPUNCTURE: CPT | Performed by: FAMILY MEDICINE

## 2021-11-04 PROCEDURE — 80061 LIPID PANEL: CPT | Performed by: FAMILY MEDICINE

## 2021-11-04 PROCEDURE — 84550 ASSAY OF BLOOD/URIC ACID: CPT | Performed by: FAMILY MEDICINE

## 2021-11-04 PROCEDURE — 0064A PR ADMIN COVID VAC MODERNA, 0.25ML BOOSTER: CPT | Performed by: FAMILY MEDICINE

## 2021-11-04 PROCEDURE — 99214 OFFICE O/P EST MOD 30 MIN: CPT | Mod: 25 | Performed by: FAMILY MEDICINE

## 2021-11-04 PROCEDURE — 86803 HEPATITIS C AB TEST: CPT | Performed by: FAMILY MEDICINE

## 2021-11-04 RX ORDER — LANSOPRAZOLE 30 MG/1
TABLET, ORALLY DISINTEGRATING, DELAYED RELEASE ORAL
Qty: 90 TABLET | Refills: 3 | Status: SHIPPED | OUTPATIENT
Start: 2021-11-04 | End: 2022-09-23

## 2021-11-04 RX ORDER — LISINOPRIL 2.5 MG/1
2.5 TABLET ORAL DAILY
Qty: 90 TABLET | Refills: 3 | Status: SHIPPED | OUTPATIENT
Start: 2021-11-04 | End: 2022-09-23

## 2021-11-04 RX ORDER — ALLOPURINOL 100 MG/1
200 TABLET ORAL DAILY
Qty: 180 TABLET | Refills: 3 | Status: SHIPPED | OUTPATIENT
Start: 2021-11-04 | End: 2021-11-05

## 2021-11-04 RX ORDER — ATORVASTATIN CALCIUM 20 MG/1
20 TABLET, FILM COATED ORAL DAILY
Qty: 90 TABLET | Refills: 3 | Status: SHIPPED | OUTPATIENT
Start: 2021-11-04 | End: 2022-09-23

## 2021-11-04 RX ORDER — INDOMETHACIN 50 MG/1
CAPSULE ORAL
Qty: 30 CAPSULE | Refills: 3 | Status: SHIPPED | OUTPATIENT
Start: 2021-11-04 | End: 2022-09-23

## 2021-11-04 RX ORDER — CLOPIDOGREL BISULFATE 75 MG/1
TABLET ORAL
Qty: 90 TABLET | Refills: 3 | Status: SHIPPED | OUTPATIENT
Start: 2021-11-04 | End: 2022-09-23

## 2021-11-04 ASSESSMENT — MIFFLIN-ST. JEOR: SCORE: 2186.63

## 2021-11-04 NOTE — NURSING NOTE
"Chief Complaint   Patient presents with     Hyperlipidemia     Hypertension     Arthritis       Initial /80   Pulse 70   Temp 97.6  F (36.4  C) (Tympanic)   Resp 18   Ht 1.803 m (5' 11\")   Wt 132.5 kg (292 lb)   BMI 40.73 kg/m   Estimated body mass index is 40.73 kg/m  as calculated from the following:    Height as of this encounter: 1.803 m (5' 11\").    Weight as of this encounter: 132.5 kg (292 lb).    Patient presents to the clinic using     Health Maintenance that is potentially due pending provider review:          Is there anyone who you would like to be able to receive your results?   If yes have patient fill out MIRNA    "

## 2021-11-04 NOTE — PROGRESS NOTES
"ASSESSMENT:     Assessment & Plan     Acute gout of right ankle, unspecified cause  3-4 flares of 1-2 days in past year - feels well controlled. Continue allopurinol 200 and indomethacin for flares.   -Recheck Uric acid, hepatic function today    Hyperlipidemia LDL goal <100  HLD. History of CVA.   -Continue Lipitor 20mg daily  -Recheck panel today    Cerebrovascular accident (CVA) due to embolism of precerebral artery (H)  2012.   - Continue Plavix 75mg    Gastroesophageal reflux disease without esophagitis  Well controlled. Risks of long term rx discussed.  -Continue Prevacid 30 mg qd    Cerebral infarction due to embolism of anterior cerebral artery, unspecified blood vessel laterality (H)  - Continue Plavix 75    MADDY (obstructive sleep apnea)  Well controlled on CPAP.    Type 2 diabetes mellitus without complication, without long-term current use of insulin (H)  No rx at this time. Recheck A1c today. Not checking home BG. Well educated on complications of diabetes.    Essential hypertension with goal blood pressure less than 140/90  Maintained on lisinopril 2.5 mg every day; BP at goal today.   -BMP    Healthcare Maintenance  HCV screen today. Colon cancer due in 6 years. Tdap and flu shot UTD. Weight, diet, and exercise discussed. COVID-19 booster today.       BMI:   Estimated body mass index is 40.73 kg/m  as calculated from the following:    Height as of this encounter: 1.803 m (5' 11\").    Weight as of this encounter: 132.5 kg (292 lb).     No follow-ups on file.    DECLAN Tenorio [facility] MS3 11/04/21 9:24 AM      Physician Attestation   I, SAVI RAUSCH MD  was present with the medical student, , who participated in the service and in the documentation of the note.  I have verified the history and personally performed the physical exam and medical decision making.  I agree with the assessment and plan of care as documented in the note.      Items personally reviewed: imaging and agree with the " "interpretation documented in the note.    MD Arnold Vasquez MD  [facility]    Lizy Alvarenga is a 56 year old who presents for the following health issues  Chief Complaint   Patient presents with     Hyperlipidemia     Hypertension     Arthritis      Hyperlipidemia Follow-Up      Are you regularly taking any medication or supplement to lower your cholesterol?   Atorvastatin     Are you having muscle aches or other side effects that you think could be caused by your cholesterol lowering medication?  No    Hypertension Follow-up      Do you check your blood pressure regularly outside of the clinic? no     Are you following a low salt diet? Yes    Are your blood pressures ever more than 140 on the top number (systolic) OR more   than 90 on the bottom number (diastolic), for example 140/90? No      How many days per week do you miss taking your medication? 0    Medication Followup of Gout    Taking Medication as prescribed: yes    Side Effects:  None    Medication Helping Symptoms:  yes         Review of Systems   Constitutional, HEENT, cardiovascular, pulmonary, GI, , musculoskeletal, neuro, skin, endocrine and psych systems are negative, except as otherwise noted.      Objective    /80   Pulse 70   Temp 97.6  F (36.4  C) (Tympanic)   Resp 18   Ht 1.803 m (5' 11\")   Wt 132.5 kg (292 lb)   BMI 40.73 kg/m    Body mass index is 40.73 kg/m .  Physical Exam   GENERAL: healthy, alert and no distress  EYES: Eyes grossly normal to inspection, PERRL and conjunctivae and sclerae normal  HENT: ear canals and TM's normal, nose and mouth without ulcers or lesions  NECK: no adenopathy, no asymmetry, masses, or scars and thyroid normal to palpation  RESP: lungs clear to auscultation - no rales, rhonchi or wheezes  CV: regular rate and rhythm, normal S1 S2, no S3 or S4, no murmur, click or rub, no peripheral edema and peripheral pulses strong  ABDOMEN: soft, nontender, " no hepatosplenomegaly, no masses and bowel sounds normal  MS: no gross musculoskeletal defects noted, 1+ pitting edema  SKIN: no suspicious lesions or rashes  NEURO: Normal strength and tone, mentation intact and speech normal  PSYCH: mentation appears normal, affect normal/bright

## 2021-11-05 RX ORDER — ALLOPURINOL 300 MG/1
300 TABLET ORAL DAILY
Qty: 90 TABLET | Refills: 3 | Status: SHIPPED | OUTPATIENT
Start: 2021-11-05 | End: 2022-09-23

## 2021-11-05 NOTE — RESULT ENCOUNTER NOTE
"Leroy Alvarenga,  Hepatitis C test is negative.    Microalbumin urine test is normal indicating no increased amount of protein in urine. This is a measure of kidney function.    Triglycerides, a type of fat found in the bloodstream is high. Other lipids are normal.   The LDL \"bad cholesterol\" is at goal level.   The blood chemistries (Basic metabolic panel) are all normal including electrolytes (salt balances in the blood) and kidney tests with the exception of glucose which is high.      Uric acid is a little high.  Goal is under 5-6.  Mild anemia.   HgbA1C is a test for diabetes mellitus that gives us an estimate of blood sugar over the past three months.   The HbgA1C is at a good level indicating that the diabetes is well controlled.   PLAN: I suggest increase in allopurinol to 300mg daily.    I will send prescription for 300mg pills.  Use current pills until gone taking 3 pills once daily.    REcheck uric acid in another month.   "

## 2022-03-21 ENCOUNTER — TRANSFERRED RECORDS (OUTPATIENT)
Dept: HEALTH INFORMATION MANAGEMENT | Facility: CLINIC | Age: 57
End: 2022-03-21
Payer: COMMERCIAL

## 2022-03-21 LAB — RETINOPATHY: NEGATIVE

## 2022-09-23 ENCOUNTER — OFFICE VISIT (OUTPATIENT)
Dept: FAMILY MEDICINE | Facility: CLINIC | Age: 57
End: 2022-09-23
Payer: COMMERCIAL

## 2022-09-23 VITALS
RESPIRATION RATE: 16 BRPM | BODY MASS INDEX: 40.6 KG/M2 | SYSTOLIC BLOOD PRESSURE: 134 MMHG | TEMPERATURE: 97.3 F | DIASTOLIC BLOOD PRESSURE: 82 MMHG | OXYGEN SATURATION: 97 % | WEIGHT: 290 LBS | HEART RATE: 73 BPM | HEIGHT: 71 IN

## 2022-09-23 DIAGNOSIS — E11.9 TYPE 2 DIABETES MELLITUS WITHOUT COMPLICATION, WITHOUT LONG-TERM CURRENT USE OF INSULIN (H): Primary | ICD-10-CM

## 2022-09-23 DIAGNOSIS — E78.5 HYPERLIPIDEMIA LDL GOAL <100: ICD-10-CM

## 2022-09-23 DIAGNOSIS — K21.9 GASTROESOPHAGEAL REFLUX DISEASE WITHOUT ESOPHAGITIS: ICD-10-CM

## 2022-09-23 DIAGNOSIS — I63.429 CEREBRAL INFARCTION DUE TO EMBOLISM OF ANTERIOR CEREBRAL ARTERY, UNSPECIFIED BLOOD VESSEL LATERALITY (H): ICD-10-CM

## 2022-09-23 DIAGNOSIS — Z23 NEED FOR PNEUMOCOCCAL VACCINE: ICD-10-CM

## 2022-09-23 DIAGNOSIS — Z23 NEEDS FLU SHOT: ICD-10-CM

## 2022-09-23 DIAGNOSIS — M1A.0710 CHRONIC GOUT OF RIGHT ANKLE, UNSPECIFIED CAUSE: ICD-10-CM

## 2022-09-23 DIAGNOSIS — I10 ESSENTIAL HYPERTENSION WITH GOAL BLOOD PRESSURE LESS THAN 140/90: ICD-10-CM

## 2022-09-23 DIAGNOSIS — L60.8 NAIL DEFORMITY: ICD-10-CM

## 2022-09-23 LAB
ALBUMIN SERPL BCG-MCNC: 4.9 G/DL (ref 3.5–5.2)
ALP SERPL-CCNC: 78 U/L (ref 40–129)
ALT SERPL W P-5'-P-CCNC: 42 U/L (ref 10–50)
ANION GAP SERPL CALCULATED.3IONS-SCNC: 10 MMOL/L (ref 7–15)
AST SERPL W P-5'-P-CCNC: 33 U/L (ref 10–50)
BILIRUB SERPL-MCNC: 0.4 MG/DL
BUN SERPL-MCNC: 9.5 MG/DL (ref 6–20)
CALCIUM SERPL-MCNC: 9.5 MG/DL (ref 8.6–10)
CHLORIDE SERPL-SCNC: 102 MMOL/L (ref 98–107)
CREAT SERPL-MCNC: 0.83 MG/DL (ref 0.67–1.17)
DEPRECATED HCO3 PLAS-SCNC: 28 MMOL/L (ref 22–29)
ERYTHROCYTE [DISTWIDTH] IN BLOOD BY AUTOMATED COUNT: 13.5 % (ref 10–15)
GFR SERPL CREATININE-BSD FRML MDRD: >90 ML/MIN/1.73M2
GLUCOSE SERPL-MCNC: 156 MG/DL (ref 70–99)
HBA1C MFR BLD: 6.7 % (ref 0–5.6)
HCT VFR BLD AUTO: 37.3 % (ref 40–53)
HGB BLD-MCNC: 12.3 G/DL (ref 13.3–17.7)
MCH RBC QN AUTO: 27.4 PG (ref 26.5–33)
MCHC RBC AUTO-ENTMCNC: 33 G/DL (ref 31.5–36.5)
MCV RBC AUTO: 83 FL (ref 78–100)
PLATELET # BLD AUTO: 198 10E3/UL (ref 150–450)
POTASSIUM SERPL-SCNC: 4.7 MMOL/L (ref 3.4–5.3)
PROT SERPL-MCNC: 7.6 G/DL (ref 6.4–8.3)
RBC # BLD AUTO: 4.49 10E6/UL (ref 4.4–5.9)
SODIUM SERPL-SCNC: 140 MMOL/L (ref 136–145)
URATE SERPL-MCNC: 7 MG/DL (ref 3.4–7)
WBC # BLD AUTO: 4.7 10E3/UL (ref 4–11)

## 2022-09-23 PROCEDURE — 84550 ASSAY OF BLOOD/URIC ACID: CPT | Performed by: FAMILY MEDICINE

## 2022-09-23 PROCEDURE — 83036 HEMOGLOBIN GLYCOSYLATED A1C: CPT | Performed by: FAMILY MEDICINE

## 2022-09-23 PROCEDURE — 90472 IMMUNIZATION ADMIN EACH ADD: CPT | Performed by: FAMILY MEDICINE

## 2022-09-23 PROCEDURE — 90471 IMMUNIZATION ADMIN: CPT | Performed by: FAMILY MEDICINE

## 2022-09-23 PROCEDURE — 90682 RIV4 VACC RECOMBINANT DNA IM: CPT | Performed by: FAMILY MEDICINE

## 2022-09-23 PROCEDURE — 85027 COMPLETE CBC AUTOMATED: CPT | Performed by: FAMILY MEDICINE

## 2022-09-23 PROCEDURE — 99214 OFFICE O/P EST MOD 30 MIN: CPT | Mod: 25 | Performed by: FAMILY MEDICINE

## 2022-09-23 PROCEDURE — 36415 COLL VENOUS BLD VENIPUNCTURE: CPT | Performed by: FAMILY MEDICINE

## 2022-09-23 PROCEDURE — 80053 COMPREHEN METABOLIC PANEL: CPT | Performed by: FAMILY MEDICINE

## 2022-09-23 PROCEDURE — 90677 PCV20 VACCINE IM: CPT | Performed by: FAMILY MEDICINE

## 2022-09-23 RX ORDER — ALLOPURINOL 300 MG/1
450 TABLET ORAL DAILY
Qty: 135 TABLET | Refills: 3 | Status: SHIPPED | OUTPATIENT
Start: 2022-09-23 | End: 2023-03-27

## 2022-09-23 RX ORDER — LISINOPRIL 2.5 MG/1
2.5 TABLET ORAL DAILY
Qty: 90 TABLET | Refills: 3 | Status: SHIPPED | OUTPATIENT
Start: 2022-09-23 | End: 2022-12-19

## 2022-09-23 RX ORDER — ATORVASTATIN CALCIUM 20 MG/1
20 TABLET, FILM COATED ORAL DAILY
Qty: 90 TABLET | Refills: 3 | Status: SHIPPED | OUTPATIENT
Start: 2022-09-23 | End: 2022-10-13

## 2022-09-23 RX ORDER — ALLOPURINOL 300 MG/1
300 TABLET ORAL DAILY
Qty: 90 TABLET | Refills: 3 | Status: SHIPPED | OUTPATIENT
Start: 2022-09-23 | End: 2022-09-23

## 2022-09-23 RX ORDER — LANSOPRAZOLE 30 MG/1
TABLET, ORALLY DISINTEGRATING, DELAYED RELEASE ORAL
Qty: 90 TABLET | Refills: 3 | Status: SHIPPED | OUTPATIENT
Start: 2022-09-23 | End: 2023-03-27

## 2022-09-23 RX ORDER — INDOMETHACIN 50 MG/1
CAPSULE ORAL
Qty: 30 CAPSULE | Refills: 3 | Status: SHIPPED | OUTPATIENT
Start: 2022-09-23 | End: 2023-03-27

## 2022-09-23 RX ORDER — CLOPIDOGREL BISULFATE 75 MG/1
TABLET ORAL
Qty: 90 TABLET | Refills: 3 | Status: SHIPPED | OUTPATIENT
Start: 2022-09-23 | End: 2022-11-18

## 2022-09-23 ASSESSMENT — PAIN SCALES - GENERAL: PAINLEVEL: NO PAIN (0)

## 2022-09-23 NOTE — RESULT ENCOUNTER NOTE
Leroy Alvarenga,  Your uric acid level is 7 which is the upper limit of normal.  This is the chemical that makes you more prone to having gout.  It should be lower than that being on the allopurinol.  The blood chemistries (Basic metabolic panel) are all normal including electrolytes (salt balances in the blood), liver and kidney tests.   Glucose (blood sugar) is high.  HgbA1C is a test for diabetes mellitus that gives us an estimate of blood sugar over the past three months.   The HbgA1C is at a good level indicating that the diabetes is well controlled.   Mild anemia which has been present in the past.     PLAN: I suggest increasing the allopurinol to 1-1/2 pills daily which is 450 mg.  I will send new prescription.   REcheck in 6 months.   SAVI RAUSCH MD

## 2022-09-23 NOTE — PROGRESS NOTES
ASSESSMENT/PLAN:                                                    Lifestyle recommendations:continue to exercise on a regular basis  keep working on losing weight (ideal BMI-body mass index is <25)  Diabetic recommendations:-return for follow-up visit in 6 month(s) if oK and no changes.      (E11.9) Type 2 diabetes mellitus without complication, without long-term current use of insulin (H)  (primary encounter diagnosis)  Comment: due for follow-up   Plan: HEMOGLOBIN A1C        Hgb A1C today.  Changes based on results     (M1A.0710) Chronic gout of right ankle, unspecified cause  Comment: No recent gout   Plan: Uric acid, Comprehensive metabolic panel (BMP +        Alb, Alk Phos, ALT, AST, Total. Bili, TP), CBC         with platelets, allopurinol (ZYLOPRIM) 300 MG         tablet, indomethacin (INDOCIN) 50 MG capsule        Blood tests.  No change in current treatment plan.     (I10) Essential hypertension with goal blood pressure less than 140/90  Comment: doing well  Plan: No change in current treatment plan.      (L60.8) Nail deformity  Comment: left great toenail.    Plan: Trimming edges back helps.    It can be trimmed way back which lasts 6 months or see podiatrist who can make nail permanently narrower so not pinching the skin.  Let us know if you would like a referral.     (Z23) Need for pneumococcal vaccine  Comment:   Plan: Pneumococcal 20 Valent Conjugate (Prevnar 20)          (Z23) Needs flu shot  Comment:   Plan: INFLUENZA QUAD, RECOMBINANT, P-FREE (RIV4)         (FLUBLOK) AGE 50-64 [[id number]]          (E78.5) Hyperlipidemia LDL goal <100  Comment: has been well controlled  Plan: atorvastatin (LIPITOR) 20 MG tablet        No change in current treatment plan.  Refills.     (I63.429) Cerebral infarction due to embolism of anterior cerebral artery, unspecified blood vessel laterality (H)  Comment: past stroke  Plan: clopidogrel (PLAVIX) 75 MG tablet, lisinopril         (ZESTRIL) 2.5 MG tablet        No  change in current treatment plan.  REfills.     (K21.9) Gastroesophageal reflux disease without esophagitis  Comment: stable  Plan: LANsoprazole (PREVACID SOLUTAB) 30 MG ODT        No change in current treatment plan.  REfills.        Diabetes complications/Comorbid conditions:  Nephropathy:no    Retinopathy: no  Neuropathy: no  Vascular disease: no  High/low problems: no  Tobacco use: no    Treatments:  Statin: atorvastatin   Exercise: hiking, walking  ACE: lisinopril   ASA: no takes Clopidogrel (Plavix)         Lizy Alvarenga is a 57 year old, presenting for the following health issues:  Diabetes, Hypertension, Lipids, and Ingrown Toenail    Chief Complaint   Patient presents with     Diabetes     Hypertension     Lipids     Ingrown Toenail       Hypertension: He presents for follow up of hypertension.  He does not check blood pressure  regularly outside of the clinic. Outpatient blood pressures have not been over 140/90. He follows a low salt diet.       Diabetes Follow-up    How often are you checking your blood sugar? A few times a month  Not currently on medication for the diabetes mellitus.   What time of day are you checking your blood sugars (select all that apply)?  Before meals  Have you had any blood sugars above 200?  No  Have you had any blood sugars below 70?  No    What symptoms do you notice when your blood sugar is low?  Not applicable    What concerns do you have today about your diabetes? None     Do you have any of these symptoms? (Select all that apply)  Redness, sores, or blisters on feet        Hyperlipidemia Follow-Up      Are you regularly taking any medication or supplement to lower your cholesterol?   Yes- atorvastatin    Are you having muscle aches or other side effects that you think could be caused by your cholesterol lowering medication?  Yes- nadege joe      BP Readings from Last 2 Encounters:   09/23/22 134/82   11/04/21 120/80     Hemoglobin A1C (%)   Date Value    11/04/2021 6.7 (H)   10/23/2020 6.3 (H)   01/17/2020 6.2 (H)     LDL Cholesterol Calculated (mg/dL)   Date Value   11/04/2021 60   10/23/2020 54   07/17/2019 42       Last clinic visit:11/4/21  Medication or other changes at last visit:increased allopurinol   Weight since last visit?  Down 2#  Wt Readings from Last 5 Encounters:   09/23/22 131.5 kg (290 lb)   11/04/21 132.5 kg (292 lb)   10/23/20 132 kg (291 lb)   01/17/20 128.5 kg (283 lb 6.4 oz)   01/07/20 131.6 kg (290 lb 3.2 oz)      History   Smoking Status     Former Smoker     Packs/day: 1.00     Years: 25.00     Types: Cigarettes     Quit date: 3/28/2010   Smokeless Tobacco     Never Used       Past medical history reviewed and updated    Patient Active Problem List    Diagnosis Date Noted     Gastroesophageal reflux disease without esophagitis 11/04/2021     Priority: Medium     Type 2 diabetes mellitus without complication, without long-term current use of insulin (H) 07/17/2019     Priority: Medium     Chronic gout of right ankle, unspecified cause 07/17/2019     Priority: Medium     Morbid obesity (H) 04/17/2019     Priority: Medium     Generalized anxiety disorder 03/06/2013     Priority: Medium     Diagnosis updated by automated process. Provider to review and confirm.       MADDY (obstructive sleep apnea) 01/22/2013     Priority: Medium     Moderate MADDY (12/12/2012 with AHI 22.7, anisha desat 86%)  1/17/2020:Seen yearly by sleep clinic.  Uses CPAP nightly.        CVA (cerebral vascular accident) (H) 11/08/2012     Priority: Medium     2012 stroke.  He had right sided weakness, slurred speech and some vision changes.  He had no residual symptoms.   Was out of work for nearly 8 months.    4/13/2012 neurology notes:3 small areas of acute infarction, embolic, in the posterior right border zone area, with small segment of high grade stenosis in the distal right MCA branch vessel in the posterior aspect of the right sylvian fissure. No embolic source found.  No coagulation abnormality found on initial screen.         Essential hypertension with goal blood pressure less than 140/90 06/08/2012     Priority: Medium     Cerebral embolism with cerebral infarction (H) 03/29/2012     Priority: Medium     Hyperlipidemia LDL goal <100 03/29/2012     Priority: Medium     Advanced care planning/counseling discussion 03/28/2012     Priority: Low     Worksheet given and will bring copy in when complete, will call if has questions           Current Outpatient Medications   Medication Sig Dispense Refill     allopurinol (ZYLOPRIM) 300 MG tablet Take 1 tablet (300 mg) by mouth daily 90 tablet 3     Ascorbic Acid (VITAMIN C PO) Take 1,000 mg by mouth        atorvastatin (LIPITOR) 20 MG tablet Take 1 tablet (20 mg) by mouth daily 90 tablet 3     blood glucose (ACCU-CHEK GUIDE) test strip Use to test blood sugar 3 times daily or as directed. 100 each 11     blood glucose monitoring (ACCU-CHEK FASTCLIX) lancets Use to test blood sugar 3 times daily or as directed.  Ok to substitute alternative if insurance prefers. 102 each 11     Cholecalciferol (VITAMIN D) 2000 UNITS tablet Take 2,000 Units by mouth daily. (Patient taking differently: Take 1,000 Units by mouth daily) 100 tablet 3     clopidogrel (PLAVIX) 75 MG tablet TAKE 1 TABLET BY MOUTH ONCE DAILY 90 tablet 3     cyanocobalamin (B-12) 1000 MCG TBCR Take 1,000 mcg by mouth daily. 100 tablet 1     IBUPROFEN PO Take 400-600 mg by mouth as needed.         indomethacin (INDOCIN) 50 MG capsule TAKE 1 CAPSULE BY MOUTH THREE TIMES DAILY WITH MEALS 30 capsule 3     LANsoprazole (PREVACID SOLUTAB) 30 MG ODT DISSOLVE 1 TABLET BY MOUTH ONCE DAILY 90 tablet 3     lisinopril (ZESTRIL) 2.5 MG tablet Take 1 tablet (2.5 mg) by mouth daily 90 tablet 3     mupirocin (BACTROBAN) 2 % external ointment Apply topically 3 times daily 10 days for skin sores. 22 g 0     order for DME  HEATED HUMIDITY   CHIN STRAP   NON-DSPOSABLE FILTERS    "DISPOSABLE FILTERS (2 PER 1 MONTHS)   NASAL PILLOW (2 PER 1 MONTHS)   FULL FACE MASK  (1 PER 3 MONTHS)   FULL FACE CUSHION  (1 PER 1 MONTHS)   HUMIDIFIER CHAMBER (1 PER 6 MONTHS)   MASK (1 PER 3 MONTHS)   NASAL CUSHION (2 PER 1 MONTHS)   HEADGEAR (1 PER 6 MONTHS)   TUBING (1 PER 3 MONTHS)   HEATED TUBING (1 PER 3 MONTHS) 1 Device 1     ORDER FOR DME Pt was set up on a [employer] RemStar 60 Series Auto AFlex 5-15 cm H2O with heated humidity and a modem. Pt chose a Mirage FX nasal mask standard size.       ASPIRIN NOT PRESCRIBED, INTENTIONAL, Antiplatelet medication not prescribed intentionally due to Plavix (Patient not taking: Reported on 9/23/2022)  0     calcium carbonate (TUMS) 500 MG chewable tablet Take 1 tablet by mouth 2 times daily. (Patient not taking: Reported on 9/23/2022) 180 chew tab 3     ROS:General: No change in weight, sleep or appetite.  Normal energy.  No fever or chills  Resp: No coughing, wheezing or shortness of breath  CV: No chest pains or palpitations  GI: No nausea, vomiting,  heartburn, abdominal pain, diarrhea, constipation or change in bowel habits  : No urinary frequency or dysuria, bladder or kidney problems  Musculoskeletal: No significant muscle or joint pains  Psychiatric: No problems with anxiety, depression or mental health  NO gout episodes.     OBJECTIVE:Blood pressure 134/82, pulse 73, temperature 97.3  F (36.3  C), temperature source Tympanic, resp. rate 16, height 1.803 m (5' 11\"), weight 131.5 kg (290 lb), SpO2 97 %. BMI=Body mass index is 40.45 kg/m .  GENERAL APPEARANCE ADULT: Alert, no acute distress, morbidly obese  NECK: No adenopathy,masses or thyromegaly  RESP: lungs clear to auscultation   CV: normal rate, regular rhythm, no murmur or gallop  ABDOMEN: soft, no organomegaly, masses or tenderness  MS: extremities normal, no peripheral edema   Foot exam:normal DP and PT pulses, no trophic changes or ulcerative lesions, " normal monofilament exam   right great toenail with thickening and discoloration consistent with onychomycosis   Left great toenail without any erythema or swelling.  Marked nail curvature with pain at lateral skin folds with pressure on nail.

## 2022-09-23 NOTE — PATIENT INSTRUCTIONS
ASSESSMENT/PLAN:                                                    Lifestyle recommendations:continue to exercise on a regular basis  keep working on losing weight (ideal BMI-body mass index is <25)  Diabetic recommendations:-return for follow-up visit in 6 month(s) if oK and no changes.      (E11.9) Type 2 diabetes mellitus without complication, without long-term current use of insulin (H)  (primary encounter diagnosis)  Comment: due for follow-up   Plan: HEMOGLOBIN A1C        Hgb A1C today.  Changes based on results     (M1A.0710) Chronic gout of right ankle, unspecified cause  Comment: No recent gout   Plan: Uric acid, Comprehensive metabolic panel (BMP +        Alb, Alk Phos, ALT, AST, Total. Bili, TP), CBC         with platelets, allopurinol (ZYLOPRIM) 300 MG         tablet, indomethacin (INDOCIN) 50 MG capsule        Blood tests.  No change in current treatment plan.     (I10) Essential hypertension with goal blood pressure less than 140/90  Comment: doing well  Plan: No change in current treatment plan.      (L60.8) Nail deformity  Comment: left great toenail.    Plan: Trimming edges back helps.    It can be trimmed way back which lasts 6 months or see podiatrist who can make nail permanently narrower so not pinching the skin.  Let us know if you would like a referral.     (Z23) Need for pneumococcal vaccine  Comment:   Plan: Pneumococcal 20 Valent Conjugate (Prevnar 20)          (Z23) Needs flu shot  Comment:   Plan: INFLUENZA QUAD, RECOMBINANT, P-FREE (RIV4)         (FLUBLOK) AGE 50-64 [ACF680]          (E78.5) Hyperlipidemia LDL goal <100  Comment: has been well controlled  Plan: atorvastatin (LIPITOR) 20 MG tablet        No change in current treatment plan.  Refills.     (I63.429) Cerebral infarction due to embolism of anterior cerebral artery, unspecified blood vessel laterality (H)  Comment: past stroke  Plan: clopidogrel (PLAVIX) 75 MG tablet, lisinopril         (ZESTRIL) 2.5 MG tablet        No  change in current treatment plan.  REfills.     (K21.9) Gastroesophageal reflux disease without esophagitis  Comment: stable  Plan: LANsoprazole (PREVACID SOLUTAB) 30 MG ODT        No change in current treatment plan.  REfills.        Diabetes complications/Comorbid conditions:  Nephropathy:no    Retinopathy: no  Neuropathy: no  Vascular disease: no  High/low problems: no  Tobacco use: no    Treatments:  Statin: atorvastatin   Exercise: hiking, walking  ACE: lisinopril   ASA: no takes Clopidogrel (Plavix)

## 2022-10-13 DIAGNOSIS — E78.5 HYPERLIPIDEMIA LDL GOAL <100: ICD-10-CM

## 2022-10-13 RX ORDER — ATORVASTATIN CALCIUM 20 MG/1
TABLET, FILM COATED ORAL
Qty: 90 TABLET | Refills: 0 | Status: SHIPPED | OUTPATIENT
Start: 2022-10-13 | End: 2023-01-17

## 2022-11-17 DIAGNOSIS — I63.429 CEREBRAL INFARCTION DUE TO EMBOLISM OF ANTERIOR CEREBRAL ARTERY, UNSPECIFIED BLOOD VESSEL LATERALITY (H): ICD-10-CM

## 2022-11-17 DIAGNOSIS — K21.9 GASTROESOPHAGEAL REFLUX DISEASE WITHOUT ESOPHAGITIS: ICD-10-CM

## 2022-11-18 RX ORDER — CLOPIDOGREL BISULFATE 75 MG/1
TABLET ORAL
Qty: 90 TABLET | Refills: 3 | Status: SHIPPED | OUTPATIENT
Start: 2022-11-18 | End: 2023-03-27

## 2022-11-18 RX ORDER — LANSOPRAZOLE 30 MG/1
CAPSULE, DELAYED RELEASE ORAL
Qty: 90 CAPSULE | Refills: 3 | Status: SHIPPED | OUTPATIENT
Start: 2022-11-18 | End: 2023-03-27

## 2022-11-18 NOTE — TELEPHONE ENCOUNTER
"Requested Prescriptions   Pending Prescriptions Disp Refills    clopidogrel (PLAVIX) 75 MG tablet [Pharmacy Med Name: Clopidogrel Bisulfate 75 MG Oral Tablet] 90 tablet 0     Sig: TAKE 1 TABLET BY MOUTH ONCE DAILY       Plavix Failed - 11/17/2022 11:48 AM        Failed - No active PPI on record unless is Protonix        Failed - Normal HGB on file in past 12 months     Recent Labs   Lab Test 09/23/22  1036   HGB 12.3*               Passed - Normal Platelets on file in past 12 months     Recent Labs   Lab Test 09/23/22  1036                  Passed - Recent (12 mo) or future (30 days) visit within the authorizing provider's specialty     Patient has had an office visit with the authorizing provider or a provider within the authorizing providers department within the previous 12 mos or has a future within next 30 days. See \"Patient Info\" tab in inbasket, or \"Choose Columns\" in Meds & Orders section of the refill encounter.              Passed - Medication is active on med list        Passed - Patient is age 18 or older          LANsoprazole (PREVACID) 30 MG DR capsule [Pharmacy Med Name: Lansoprazole 30 MG Oral Capsule Delayed Release] 90 capsule 0     Sig: Take 1 capsule by mouth once daily       PPI Protocol Failed - 11/17/2022 11:48 AM        Failed - Not on Clopidogrel (unless Pantoprazole ordered)        Passed - No diagnosis of osteoporosis on record        Passed - Recent (12 mo) or future (30 days) visit within the authorizing provider's specialty     Patient has had an office visit with the authorizing provider or a provider within the authorizing providers department within the previous 12 mos or has a future within next 30 days. See \"Patient Info\" tab in inbasket, or \"Choose Columns\" in Meds & Orders section of the refill encounter.              Passed - Medication is active on med list        Passed - Patient is age 18 or older           Ramona Patino RN     "

## 2022-12-16 DIAGNOSIS — I63.429 CEREBRAL INFARCTION DUE TO EMBOLISM OF ANTERIOR CEREBRAL ARTERY, UNSPECIFIED BLOOD VESSEL LATERALITY (H): ICD-10-CM

## 2022-12-16 RX ORDER — LISINOPRIL 2.5 MG/1
TABLET ORAL
Qty: 90 TABLET | Refills: 0 | OUTPATIENT
Start: 2022-12-16

## 2022-12-19 DIAGNOSIS — I63.429 CEREBRAL INFARCTION DUE TO EMBOLISM OF ANTERIOR CEREBRAL ARTERY, UNSPECIFIED BLOOD VESSEL LATERALITY (H): ICD-10-CM

## 2022-12-19 RX ORDER — LISINOPRIL 2.5 MG/1
TABLET ORAL
Qty: 90 TABLET | Refills: 1 | Status: SHIPPED | OUTPATIENT
Start: 2022-12-19 | End: 2023-03-27

## 2023-01-14 DIAGNOSIS — E78.5 HYPERLIPIDEMIA LDL GOAL <100: ICD-10-CM

## 2023-01-17 RX ORDER — ATORVASTATIN CALCIUM 20 MG/1
TABLET, FILM COATED ORAL
Qty: 90 TABLET | Refills: 0 | Status: SHIPPED | OUTPATIENT
Start: 2023-01-17 | End: 2023-03-27

## 2023-01-17 NOTE — TELEPHONE ENCOUNTER
Pt will plan to follow with TREV Chavez appt scheduled 03-27-23.  Routing refill request to provider for review/approval because:  Labs not current:  DAVID Licona RN

## 2023-03-27 ENCOUNTER — TRANSFERRED RECORDS (OUTPATIENT)
Dept: HEALTH INFORMATION MANAGEMENT | Facility: CLINIC | Age: 58
End: 2023-03-27

## 2023-03-27 ENCOUNTER — OFFICE VISIT (OUTPATIENT)
Dept: FAMILY MEDICINE | Facility: CLINIC | Age: 58
End: 2023-03-27
Payer: COMMERCIAL

## 2023-03-27 VITALS
TEMPERATURE: 98 F | DIASTOLIC BLOOD PRESSURE: 86 MMHG | SYSTOLIC BLOOD PRESSURE: 138 MMHG | HEART RATE: 76 BPM | OXYGEN SATURATION: 98 % | HEIGHT: 71 IN | RESPIRATION RATE: 18 BRPM | WEIGHT: 295 LBS | BODY MASS INDEX: 41.3 KG/M2

## 2023-03-27 DIAGNOSIS — E66.01 MORBID OBESITY (H): ICD-10-CM

## 2023-03-27 DIAGNOSIS — H65.01 RIGHT ACUTE SEROUS OTITIS MEDIA, RECURRENCE NOT SPECIFIED: ICD-10-CM

## 2023-03-27 DIAGNOSIS — E78.5 HYPERLIPIDEMIA LDL GOAL <100: ICD-10-CM

## 2023-03-27 DIAGNOSIS — K21.9 GASTROESOPHAGEAL REFLUX DISEASE WITHOUT ESOPHAGITIS: ICD-10-CM

## 2023-03-27 DIAGNOSIS — Z86.73 H/O: STROKE: ICD-10-CM

## 2023-03-27 DIAGNOSIS — M1A.0710 CHRONIC GOUT OF RIGHT ANKLE, UNSPECIFIED CAUSE: ICD-10-CM

## 2023-03-27 DIAGNOSIS — E11.9 TYPE 2 DIABETES MELLITUS WITHOUT COMPLICATION, WITHOUT LONG-TERM CURRENT USE OF INSULIN (H): Primary | ICD-10-CM

## 2023-03-27 DIAGNOSIS — Z12.5 SCREENING FOR PROSTATE CANCER: ICD-10-CM

## 2023-03-27 LAB
ANION GAP SERPL CALCULATED.3IONS-SCNC: 12 MMOL/L (ref 7–15)
BUN SERPL-MCNC: 7.3 MG/DL (ref 6–20)
CALCIUM SERPL-MCNC: 9.8 MG/DL (ref 8.6–10)
CHLORIDE SERPL-SCNC: 104 MMOL/L (ref 98–107)
CHOLEST SERPL-MCNC: 151 MG/DL
CREAT SERPL-MCNC: 0.8 MG/DL (ref 0.67–1.17)
CREAT UR-MCNC: 201.7 MG/DL
DEPRECATED HCO3 PLAS-SCNC: 24 MMOL/L (ref 22–29)
GFR SERPL CREATININE-BSD FRML MDRD: >90 ML/MIN/1.73M2
GLUCOSE SERPL-MCNC: 196 MG/DL (ref 70–99)
HBA1C MFR BLD: 7.4 % (ref 0–5.6)
HDLC SERPL-MCNC: 52 MG/DL
LDLC SERPL CALC-MCNC: 77 MG/DL
MICROALBUMIN UR-MCNC: 31.5 MG/L
MICROALBUMIN/CREAT UR: 15.62 MG/G CR (ref 0–17)
NONHDLC SERPL-MCNC: 99 MG/DL
POTASSIUM SERPL-SCNC: 4.8 MMOL/L (ref 3.4–5.3)
PSA SERPL DL<=0.01 NG/ML-MCNC: 0.13 NG/ML (ref 0–3.5)
RETINOPATHY: NEGATIVE
RETINOPATHY: NEGATIVE
SODIUM SERPL-SCNC: 140 MMOL/L (ref 136–145)
TRIGL SERPL-MCNC: 110 MG/DL
URATE SERPL-MCNC: 4.7 MG/DL (ref 3.4–7)

## 2023-03-27 PROCEDURE — 80048 BASIC METABOLIC PNL TOTAL CA: CPT | Performed by: FAMILY MEDICINE

## 2023-03-27 PROCEDURE — 82570 ASSAY OF URINE CREATININE: CPT | Performed by: FAMILY MEDICINE

## 2023-03-27 PROCEDURE — 82043 UR ALBUMIN QUANTITATIVE: CPT | Performed by: FAMILY MEDICINE

## 2023-03-27 PROCEDURE — 83036 HEMOGLOBIN GLYCOSYLATED A1C: CPT | Performed by: FAMILY MEDICINE

## 2023-03-27 PROCEDURE — 80061 LIPID PANEL: CPT | Performed by: FAMILY MEDICINE

## 2023-03-27 PROCEDURE — G0103 PSA SCREENING: HCPCS | Performed by: FAMILY MEDICINE

## 2023-03-27 PROCEDURE — 84550 ASSAY OF BLOOD/URIC ACID: CPT | Performed by: FAMILY MEDICINE

## 2023-03-27 PROCEDURE — 99214 OFFICE O/P EST MOD 30 MIN: CPT | Performed by: FAMILY MEDICINE

## 2023-03-27 PROCEDURE — 36415 COLL VENOUS BLD VENIPUNCTURE: CPT | Performed by: FAMILY MEDICINE

## 2023-03-27 RX ORDER — INDOMETHACIN 50 MG/1
CAPSULE ORAL
Qty: 30 CAPSULE | Refills: 3 | Status: SHIPPED | OUTPATIENT
Start: 2023-03-27 | End: 2024-05-09

## 2023-03-27 RX ORDER — LANSOPRAZOLE 30 MG/1
CAPSULE, DELAYED RELEASE ORAL
Qty: 90 CAPSULE | Refills: 3 | Status: SHIPPED | OUTPATIENT
Start: 2023-03-27 | End: 2024-05-02

## 2023-03-27 RX ORDER — ALLOPURINOL 300 MG/1
450 TABLET ORAL DAILY
Qty: 135 TABLET | Refills: 3 | Status: SHIPPED | OUTPATIENT
Start: 2023-03-27 | End: 2024-05-09

## 2023-03-27 RX ORDER — LISINOPRIL 2.5 MG/1
2.5 TABLET ORAL DAILY
Qty: 90 TABLET | Refills: 3 | Status: SHIPPED | OUTPATIENT
Start: 2023-03-27 | End: 2024-05-09

## 2023-03-27 RX ORDER — ATORVASTATIN CALCIUM 20 MG/1
20 TABLET, FILM COATED ORAL DAILY
Qty: 90 TABLET | Refills: 3 | Status: SHIPPED | OUTPATIENT
Start: 2023-03-27 | End: 2024-04-25

## 2023-03-27 RX ORDER — CLOPIDOGREL BISULFATE 75 MG/1
75 TABLET ORAL DAILY
Qty: 90 TABLET | Refills: 3 | Status: SHIPPED | OUTPATIENT
Start: 2023-03-27 | End: 2024-05-02

## 2023-03-27 ASSESSMENT — PAIN SCALES - GENERAL: PAINLEVEL: NO PAIN (0)

## 2023-03-27 NOTE — PROGRESS NOTES
"  Assessment & Plan     Type 2 diabetes mellitus without complication, without long-term current use of insulin (H)  Diet controlled.  Patient deferred pharmacotherapy.  Recommended regular exercise, healthy diet, weight loss  - Lipid panel reflex to direct LDL Non-fasting; Future  - Albumin Random Urine Quantitative with Creat Ratio; Future  - HEMOGLOBIN A1C; Future  - lisinopril (ZESTRIL) 2.5 MG tablet; Take 1 tablet (2.5 mg) by mouth daily  - Basic metabolic panel  (Ca, Cl, CO2, Creat, Gluc, K, Na, BUN); Future    Hyperlipidemia LDL goal <100  Lipitor prescription refilled  - atorvastatin (LIPITOR) 20 MG tablet; Take 1 tablet (20 mg) by mouth daily    Chronic gout of right ankle, unspecified cause  Dietary counseling provided and allopurinol, indomethacin prescriptions refilled  - allopurinol (ZYLOPRIM) 300 MG tablet; Take 1.5 tablets (450 mg) by mouth daily  - indomethacin (INDOCIN) 50 MG capsule; TAKE 1 CAPSULE BY MOUTH THREE TIMES DAILY WITH MEALS  - Uric acid; Future    Gastroesophageal reflux disease without esophagitis  Suggested to continue lansoprazole as needed  - LANsoprazole (PREVACID) 30 MG DR capsule; Take 1 capsule by mouth once daily    H/O: stroke  Continue Plavix, Lipitor  - clopidogrel (PLAVIX) 75 MG tablet; Take 1 tablet (75 mg) by mouth daily    Morbid obesity (H)  Healthy lifestyle modifications stressed including regular exercise, balanced diet, weight loss and limiting salt/caffeine/pop intake    Screening for prostate cancer  - PSA, screen; Future    Right acute serous otitis media  Patient was diagnosed with COVID-19 infection last week, having difficulty hearing from right ear.  Physical examination remarkable for serous otitis media.  Reassurance provided and suggested well hydration, warm fluids, Flonase and over-the-counter antihistamine.       BMI:   Estimated body mass index is 41.14 kg/m  as calculated from the following:    Height as of this encounter: 1.803 m (5' 11\").    Weight " "as of this encounter: 133.8 kg (295 lb).   Weight management plan: Discussed healthy diet and exercise guidelines        Gurinder Cullen MD  [facility]    Lizy Alvarenga is a 57 year old, presenting for the following health issues:  Diabetes and Hypertension    History of Present Illness       Diabetes:   He presents for follow up of diabetes.  He is not checking blood glucose. He has no concerns regarding his diabetes at this time.  He is having weight gain. The patient has had a diabetic eye exam in the last 12 months. Eye exam performed on feb 2022. Location of last eye exam [facility].        Hypertension: He presents for follow up of hypertension.  He does not check blood pressure  regularly outside of the clinic. Outpatient blood pressures have not been over 140/90. He does not follow a low salt diet.     Reason for visit:  Meeting new dr palacios looking at my right ear i cant hear out of it    He eats 0-1 servings of fruits and vegetables daily.He consumes 1 sweetened beverage(s) daily.He exercises with enough effort to increase his heart rate 9 or less minutes per day.  He exercises with enough effort to increase his heart rate 3 or less days per week.   He is taking medications regularly.       Review of Systems   Constitutional, HEENT, cardiovascular, pulmonary, GI, , musculoskeletal, neuro, skin, endocrine and psych systems are negative, except as otherwise noted.        Objective    /86 (Cuff Size: Adult Large)   Pulse 76   Temp 98  F (36.7  C) (Tympanic)   Resp 18   Ht 1.803 m (5' 11\")   Wt 133.8 kg (295 lb)   SpO2 98%   BMI 41.14 kg/m    Body mass index is 41.14 kg/m .  Physical Exam   GENERAL: alert, no distress and obese  EYES: Eyes grossly normal to inspection, PERRL and conjunctivae and sclerae normal  HENT: normal cephalic/atraumatic, right ear: clear effusion, left ear: occluded with wax, nose and mouth without ulcers or lesions, " oropharynx clear and oral mucous membranes moist  NECK: no adenopathy, no asymmetry, masses, or scars and thyroid normal to palpation  RESP: lungs clear to auscultation - no rales, rhonchi or wheezes  CV: regular rates and rhythm, normal S1 S2, no S3 or S4 and no murmur, click or rub  MS: no gross musculoskeletal defects noted, no edema  SKIN: no suspicious lesions or rashes  NEURO: Normal strength and tone, mentation intact and speech normal  PSYCH: mentation appears normal, affect normal/bright

## 2023-03-27 NOTE — NURSING NOTE
"Chief Complaint   Patient presents with     Diabetes     Hypertension     /86 (Cuff Size: Adult Large)   Pulse 76   Temp 98  F (36.7  C) (Tympanic)   Resp 18   Ht 1.803 m (5' 11\")   Wt 133.8 kg (295 lb)   SpO2 98%   BMI 41.14 kg/m   Estimated body mass index is 41.14 kg/m  as calculated from the following:    Height as of this encounter: 1.803 m (5' 11\").    Weight as of this encounter: 133.8 kg (295 lb).  Patient presents to the clinic using No DME      Health Maintenance that is potentially due pending provider review:    Health Maintenance Due   Topic Date Due     ADVANCE CARE PLANNING  03/28/2017     ZOSTER IMMUNIZATION (1 of 2) Never done     LUNG CANCER SCREENING  Never done     YEARLY PREVENTIVE VISIT  03/16/2018     COVID-19 Vaccine (3 - Booster for Barby series) 12/30/2021     LIPID  11/04/2022     MICROALBUMIN  11/04/2022     DIABETIC FOOT EXAM  11/04/2022     EYE EXAM  03/21/2023     A1C  03/23/2023                "

## 2023-05-03 ENCOUNTER — NURSE TRIAGE (OUTPATIENT)
Dept: NURSING | Facility: CLINIC | Age: 58
End: 2023-05-03

## 2023-05-03 NOTE — TELEPHONE ENCOUNTER
Nurse Triage SBAR    Is this a 2nd Level Triage? Yes    Situation:  Hearing Loss in the right ear    Background/Assessment:     Pt reporting gradual hearing loss in the right ear over the last month.    Pt got covid about 4 weeks ago.    It turned into a cold.  His sinuses plugged up and it went into his right ear.     Now he can not hear out of the right ear.    No drainage, pain, dizziness or headaches noted.  Increased ringing in the right ear.    Pt went to a Primary Care Provider first.     The ear was clear of wax and no infection.    Would like to see ENT provider for the right hearing loss.     Please call the Pt back @ [phone] for further assistance.    Nona Reid RN  Central Triage Red Flags/Med Refills      Protocol Recommended Disposition:   See in Office Today      Reason for Disposition    Patient wants to be seen    Additional Information    Negative: Followed an ear injury    Negative: Decreased hearing with nasal allergies    Negative: Part of a cold    Negative: Follows air travel or mountain driving    Negative: Earwax, questions about    Negative: Dizziness is main symptom    Negative: Tinnitus (e.g., ringing, hissing, beating) is main symptom    Negative: Patient sounds very sick or weak to the triager    Negative: Ringing in the ears (tinnitus) and taking aspirin and dosage sounds high (i.e., > 1500 mg/day)    Negative: Hearing loss in one or both ears of sudden onset and present now    Negative: Earache    Negative: Decreased hearing followed sudden, extremely loud noise (e.g., explosion, not just loud concert)    Negative: Decreased hearing and taking medication that can damage hearing (i.e., gentamycin, tobramycin, furosemide, ethacrynic acid, cisplatin, quinidine)    Protocols used: HEARING LOSS OR CHANGE-A-OH

## 2023-05-03 NOTE — TELEPHONE ENCOUNTER
"Per Dr Cullen visit note 3/27/23:    \"Right acute serous otitis media  Patient was diagnosed with COVID-19 infection last week, having difficulty hearing from right ear.  Physical examination remarkable for serous otitis media.  Reassurance provided and suggested well hydration, warm fluids, Flonase and over-the-counter antihistamine.\"  ________________________________________________________________________________  Not recent / new complaint.  No indication in EMR he has followed up with provider with recheck or by message.  No refer.  ________________________________________________________________________________    Patient reports his plugged feeling and hearing as underwater has continued and never got any better despite Provider recommendations and addition of other home self help= other sinus sprays and insufflation of his ears.  Advised patient follow up with PCP to inform of no change and need for possible additional treatment plan to bridge until he can get In an ENT and hearing exam.  Suggested he make soonest available with ANY ENT in the system as well as wait list then also check his [employer] insurance for any other ENT available in his network.    Sending for 2nd level triage to PCP since only  who has seen pt since onset.    Maylin CONWAY   [facility] RN        "

## 2023-05-03 NOTE — TELEPHONE ENCOUNTER
Relayed Dr Cullen's message. Patient cannot come to urgent care today because he works until 10 PM. Scheduled clinic appt for tomorrow with Dr Cullen.  Patient was able to schedule ENT and Audiology through [facility] for the end of June. [place] was booking out much farther.   Marley HAWK RN

## 2023-05-04 ENCOUNTER — OFFICE VISIT (OUTPATIENT)
Dept: FAMILY MEDICINE | Facility: CLINIC | Age: 58
End: 2023-05-04
Payer: COMMERCIAL

## 2023-05-04 VITALS
DIASTOLIC BLOOD PRESSURE: 80 MMHG | TEMPERATURE: 98.1 F | HEIGHT: 71 IN | RESPIRATION RATE: 18 BRPM | WEIGHT: 299 LBS | BODY MASS INDEX: 41.86 KG/M2 | SYSTOLIC BLOOD PRESSURE: 140 MMHG | HEART RATE: 73 BPM | OXYGEN SATURATION: 97 %

## 2023-05-04 DIAGNOSIS — H93.8X1 PLUGGED FEELING IN EAR, RIGHT: Primary | ICD-10-CM

## 2023-05-04 PROCEDURE — 99213 OFFICE O/P EST LOW 20 MIN: CPT | Performed by: FAMILY MEDICINE

## 2023-05-04 ASSESSMENT — PAIN SCALES - GENERAL: PAINLEVEL: NO PAIN (0)

## 2023-05-04 NOTE — NURSING NOTE
"Chief Complaint   Patient presents with     Ear Problem     BP (!) 140/80 (Cuff Size: Adult Large)   Pulse 73   Temp 98.1  F (36.7  C) (Tympanic)   Resp 18   Ht 1.803 m (5' 11\")   Wt 135.6 kg (299 lb)   SpO2 97%   BMI 41.70 kg/m   Estimated body mass index is 41.7 kg/m  as calculated from the following:    Height as of this encounter: 1.803 m (5' 11\").    Weight as of this encounter: 135.6 kg (299 lb).  Patient presents to the clinic using No DME      Health Maintenance that is potentially due pending provider review:    Health Maintenance Due   Topic Date Due     ADVANCE CARE PLANNING  03/28/2017     YEARLY PREVENTIVE VISIT  03/16/2018     COVID-19 Vaccine (3 - Booster for Barby series) 12/30/2021     DIABETIC FOOT EXAM  11/04/2022     EYE EXAM  03/21/2023                "

## 2023-05-04 NOTE — PROGRESS NOTES
"  Assessment & Plan     Plugged feeling in ear, right  Differentials discussed in detail including serous otitis media.  Reassurance provided and recommended well hydration, warm fluids and over-the-counter antihistamine.  Patient has an appointment with ENT next month.  All questions answered.        Gurinder Cullen MD  [facility]    Lizy Alvarenga is a 57 year old, presenting for the following health issues:  Ear Problem      History of Present Illness       Reason for visit:  I can't hear out of my right ear  Symptom onset:  3-4 weeks ago  Symptoms include:  Had covid in march with sinus issues and now can't hear out of right ear   Symptom intensity:  Moderate  Symptom progression:  Staying the same    He eats 0-1 servings of fruits and vegetables daily.He consumes 0 sweetened beverage(s) daily.He exercises with enough effort to increase his heart rate 9 or less minutes per day.  He exercises with enough effort to increase his heart rate 3 or less days per week.   He is taking medications regularly.         Review of Systems   Constitutional, HEENT, cardiovascular, pulmonary, gi and gu systems are negative, except as otherwise noted.      Objective    BP (!) 140/80 (Cuff Size: Adult Large)   Pulse 73   Temp 98.1  F (36.7  C) (Tympanic)   Resp 18   Ht 1.803 m (5' 11\")   Wt 135.6 kg (299 lb)   SpO2 97%   BMI 41.70 kg/m    Body mass index is 41.7 kg/m .  Physical Exam   GENERAL: alert and no distress  EYES: Eyes grossly normal to inspection, PERRL and conjunctivae and sclerae normal  HENT: normal cephalic/atraumatic, right ear: clear effusion, left ear: occluded with wax, nose and mouth without ulcers or lesions, oropharynx clear and oral mucous membranes moist  NECK: no adenopathy, no asymmetry, masses, or scars and thyroid normal to palpation  RESP: no wheezes  MS: no gross musculoskeletal defects noted, no edema  NEURO: Normal strength and tone, mentation intact and speech " normal  PSYCH: mentation appears normal, affect normal/bright    Wt Readings from Last 10 Encounters:   05/04/23 135.6 kg (299 lb)   03/27/23 133.8 kg (295 lb)   09/23/22 131.5 kg (290 lb)   11/04/21 132.5 kg (292 lb)   10/23/20 132 kg (291 lb)   01/17/20 128.5 kg (283 lb 6.4 oz)   01/07/20 131.6 kg (290 lb 3.2 oz)   09/11/19 125.6 kg (277 lb)   07/17/19 124.3 kg (274 lb)   05/15/19 125.8 kg (277 lb 6.4 oz)

## 2023-05-11 ENCOUNTER — TELEPHONE (OUTPATIENT)
Dept: FAMILY MEDICINE | Facility: CLINIC | Age: 58
End: 2023-05-11
Payer: COMMERCIAL

## 2023-05-11 DIAGNOSIS — H91.90 HEARING DIFFICULTY, UNSPECIFIED LATERALITY: Primary | ICD-10-CM

## 2023-05-11 NOTE — TELEPHONE ENCOUNTER
Patient calling requesting two referrals. Needing ENT and audiology referral. Patient had visit with Dr. Cullen on 5/4/23    Patient will be going to [facility] in [place] on 6/28/23 for both ENT and Audiology appts.     Patient states that [facility] was booked into the end of August to see specialists.     Referrals can be faxed to [facility] [phone]

## 2023-05-30 NOTE — TELEPHONE ENCOUNTER
Referrals--     Please review referral for audiology and ent, patient wants an external referral  to go to [facility] [address] [place] [address] in [facility]. Pt has appt 06-28-23 as ENT/audiologyis greater than 3 month out for visit.       Brenda MARTINEZ  Station

## 2023-05-30 NOTE — TELEPHONE ENCOUNTER
OON request submitted for Medical Review.    Shanelle  [facility] Referrals  [facility]

## 2023-06-01 ENCOUNTER — TELEPHONE (OUTPATIENT)
Dept: FAMILY MEDICINE | Facility: CLINIC | Age: 58
End: 2023-06-01
Payer: COMMERCIAL

## 2023-06-01 NOTE — TELEPHONE ENCOUNTER
Patient Quality Outreach    Patient is due for the following:   Diabetes -  Eye Exam    Next Steps:   Chart routed to abstraction.      Type of outreach:    Chart review performed, no outreach needed.      Questions for provider review:    None           Rocio Umaña CMA

## 2023-06-02 NOTE — TELEPHONE ENCOUNTER
OON request has been denied by Medical Review.  Services available in care system.  cube19 message sent to patient.    Shanelle  [facility] Referrals  [facility]

## 2023-10-17 NOTE — PROGRESS NOTES
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  I am seeing this patient in consultation for hearing difficulty at the request of the provider Dr. Cullen . The patient reports whelping a sudden hearing change in March 2023 after he had COVID.  His hearing suddenly went down on the right and his ear felt plugged.  He said this happened to him for with flying but usually this resolves from a hearing and plugging standpoint.  He currently denies any otalgia, otorrhea, bloody otorrhea.  No dizziness or vertigo.  No prior history of ear surgery.  He had some noise exposure history with prior firearm use.  No significant heavy machinery or farm equipment.  He is a right-handed firearm user, most always uses hearing protection.     Past Medical History  Patient Active Problem List   Diagnosis    Advanced care planning/counseling discussion    Cerebral embolism with cerebral infarction (H)    Hyperlipidemia LDL goal <100    Essential hypertension with goal blood pressure less than 140/90    CVA (cerebral vascular accident) (H)    MADDY (obstructive sleep apnea)    Generalized anxiety disorder    Morbid obesity (H)    Type 2 diabetes mellitus without complication, without long-term current use of insulin (H)    Chronic gout of right ankle, unspecified cause    Gastroesophageal reflux disease without esophagitis     Current Medications     Current Outpatient Medications:     allopurinol (ZYLOPRIM) 300 MG tablet, Take 1.5 tablets (450 mg) by mouth daily, Disp: 135 tablet, Rfl: 3    Ascorbic Acid (VITAMIN C PO), Take 1,000 mg by mouth , Disp: , Rfl:     ASPIRIN NOT PRESCRIBED, INTENTIONAL,, Antiplatelet medication not prescribed intentionally due to Plavix,  Disp: , Rfl: 0    atorvastatin (LIPITOR) 20 MG tablet, Take 1 tablet (20 mg) by mouth daily, Disp: 90 tablet, Rfl: 3    blood glucose (ACCU-CHEK GUIDE) test strip, Use to test blood sugar 3 times daily or as directed., Disp: 100 each, Rfl: 11    blood glucose monitoring (ACCU-CHEK FASTCLIX) lancets, Use to test blood sugar 3 times daily or as directed.  Ok to substitute alternative if insurance prefers., Disp: 102 each, Rfl: 11    Cholecalciferol (VITAMIN D) 2000 UNITS tablet, Take 2,000 Units by mouth daily. (Patient taking differently: Take 1,000 Units by mouth daily), Disp: 100 tablet, Rfl: 3    clopidogrel (PLAVIX) 75 MG tablet, Take 1 tablet (75 mg) by mouth daily, Disp: 90 tablet, Rfl: 3    cyanocobalamin (B-12) 1000 MCG TBCR, Take 1,000 mcg by mouth daily., Disp: 100 tablet, Rfl: 1    IBUPROFEN PO, Take 400-600 mg by mouth as needed.  , Disp: , Rfl:     indomethacin (INDOCIN) 50 MG capsule, TAKE 1 CAPSULE BY MOUTH THREE TIMES DAILY WITH MEALS, Disp: 30 capsule, Rfl: 3    LANsoprazole (PREVACID) 30 MG DR capsule, Take 1 capsule by mouth once daily, Disp: 90 capsule, Rfl: 3    lisinopril (ZESTRIL) 2.5 MG tablet, Take 1 tablet (2.5 mg) by mouth daily, Disp: 90 tablet, Rfl: 3    mupirocin (BACTROBAN) 2 % external ointment, Apply topically 3 times daily 10 days for skin sores., Disp: 22 g, Rfl: 0    order for DME,  HEATED HUMIDITY  CHIN STRAP  NON-DSPOSABLE FILTERS  DISPOSABLE FILTERS (2 PER 1 MONTHS)  NASAL PILLOW (2 PER 1 MONTHS)  FULL FACE MASK  (1 PER 3 MONTHS)  FULL FACE CUSHION  (1 PER 1 MONTHS)  HUMIDIFIER CHAMBER (1 PER 6 MONTHS)  MASK (1 PER 3 MONTHS)  NASAL CUSHION (2 PER 1 MONTHS)  HEADGEAR (1 PER 6 MONTHS)  TUBING (1 PER 3 MONTHS)  HEATED TUBING (1 PER 3 MONTHS), Disp: 1 Device, Rfl: 1    ORDER FOR DME, Pt was set up on a Respironics RemStar 60 Series Auto AFlex 5-15 cm H2O with heated humidity and a modem. Pt chose a Mirage FX nasal mask  standard size., Disp: , Rfl:     Allergies  Allergies   Allergen Reactions    Nka [No Known Allergies]        Social History   Social History     Socioeconomic History    Marital status: Single   Tobacco Use    Smoking status: Former     Packs/day: 1.00     Years: 25.00     Additional pack years: 0.00     Total pack years: 25.00     Types: Cigarettes     Quit date: 3/28/2010     Years since quittin.5     Passive exposure: Past    Smokeless tobacco: Never   Vaping Use    Vaping Use: Never used   Substance and Sexual Activity    Alcohol use: No     Alcohol/week: 5.0 standard drinks of alcohol     Types: 6 Standard drinks or equivalent per week    Drug use: No    Sexual activity: Yes     Partners: Female   Other Topics Concern    Parent/sibling w/ CABG, MI or angioplasty before 65F 55M? Yes     Comment: mother-late 50's, mGF mid 50's RIP       Family History  Family History   Problem Relation Age of Onset    Heart Disease Mother         MI late 50's age    Diabetes Father     Blood Disease Father         clots    Breast Cancer Maternal Grandmother     Heart Disease Maternal Grandfather     Alcohol/Drug Paternal Grandfather     Blood Disease Brother         clots-antiptrip    Diabetes Brother 52        Type II       Review of Systems  As per HPI and PMHx, otherwise 10+ comprehensive system review is negative.    Physical Exam  BP (!) 163/90 (BP Location: Right arm, Patient Position: Chair, Cuff Size: Adult Large)   Pulse 80   Temp 98  F (36.7  C) (Tympanic)   Wt 127 kg (280 lb)   BMI 39.05 kg/m    GENERAL: Patient is a pleasant, cooperative 58 year old male in no acute distress.  HEAD: Normocephalic, atraumatic.  Hair and scalp are normal.  EYES: Pupils are equal, round, reactive to light and accommodation.  Extraocular movements are intact.  The sclera nonicteric without injection.  The extraocular structures are normal.  EARS: See below.    NOSE: Nares are patent.  Nasal mucosa is pink and moist.  Negative  anterior rhinoscopy.    NEUROLOGIC: Cranial nerves II through XII are grossly intact.  Voice is strong.  Patient is House-Brackmann I/VI bilaterally.  CARDIOVASCULAR: Extremities are warm and well-perfused.  No significant peripheral edema.  RESPIRATORY: Patient has nonlabored breathing without cough, wheeze, stridor.  PSYCHIATRIC: Patient is alert and oriented.  Mood and affect appear normal.  SKIN: Warm and dry.  No scalp, face, or neck lesions noted.    Physical Exam and Procedure    EARS: Normal shape and symmetry.  No tenderness when palpating the mastoid or tragal areas bilaterally.  The ears were then examined under the otic binocular microscope to perform cerumen removal.  Otoscopic exam on the right reveals a clear canal.  The right tympanic membrane was round, intact without evidence of effusion.  No retraction, granulation, drainage, or evidence of cholesteatoma.      Attention was turned to the left ear.  Otoscopic exam on the left reveals impacted cerumen down to the level of the tympanic membrane.  The cerumen was removed with a #7 suction.  The left tympanic membrane was round, intact without evidence of effusion.  No retraction, granulation, drainage, or evidence of cholesteatoma.      Audiogram  The patient underwent an audiogram performed today.  My review of the audiogram shows normal sloping to moderate sloping to mild sloping to moderate sensorineural hearing loss in the right ear and borderline normal to 3000 Hz sloping to mild sloping to moderate sensorineural hearing loss in the left ear.  Pure-tone average is 43 dB on the right and 17 dB on the left.  Speech reception threshold is 35 dB on the right and 25 dB on the left.  The patient had 84% word recognition on the right and 100% word recognition on the left.  The patient had a type A tympanogram on the right and a type A deep tympanogram on the left.      Assessment and Plan     ICD-10-CM    1. Sensorineural hearing loss, bilateral  H90.3  Microscopy, Binocular      2. Asymmetrical sensorineural hearing loss  H90.3 Microscopy, Binocular      3. Sudden idiopathic hearing loss of right ear with restricted hearing of left ear  H91.21 Adult Audiology  Referral     MR Brain w/o & w Contrast     Adult ENT  Referral     Microscopy, Binocular      4. Impacted cerumen of left ear  H61.22 Microscopy, Binocular        It was my pleasure seeing Lyle Tenorio today in clinic.  The patient presents today with fairly significant asymmetric hearing loss in the right ear that was relatively sudden in nature about 7 or 8 months ago.  I would recommend an MRI with IAC protocol to rule out retrocochlear pathology.  I will contact the patient with the MRI results when available.  We spent the remainder of today's visit on education. We discussed hearing protection in noisy environments.    The patient is medically cleared for consideration of a hearing aid evaluation.    The patient will follow up as necessary for worsening symptoms or changes in symptoms. I have also recommended repeat audiogram in 1-3 years, or sooner if symptoms warrant.      Lyle to follow up with Primary Care provider regarding elevated blood pressure.    Kiko Hardwick MD  Department of Otolaryngology-Head and Neck Surgery  [facility]

## 2023-10-30 ENCOUNTER — OFFICE VISIT (OUTPATIENT)
Dept: OTOLARYNGOLOGY | Facility: CLINIC | Age: 58
End: 2023-10-30
Attending: FAMILY MEDICINE
Payer: COMMERCIAL

## 2023-10-30 ENCOUNTER — OFFICE VISIT (OUTPATIENT)
Dept: AUDIOLOGY | Facility: CLINIC | Age: 58
End: 2023-10-30
Attending: FAMILY MEDICINE
Payer: COMMERCIAL

## 2023-10-30 VITALS
TEMPERATURE: 98 F | SYSTOLIC BLOOD PRESSURE: 163 MMHG | WEIGHT: 280 LBS | BODY MASS INDEX: 39.05 KG/M2 | HEART RATE: 80 BPM | DIASTOLIC BLOOD PRESSURE: 90 MMHG

## 2023-10-30 DIAGNOSIS — H91.90 HEARING DIFFICULTY, UNSPECIFIED LATERALITY: ICD-10-CM

## 2023-10-30 DIAGNOSIS — H61.22 IMPACTED CERUMEN OF LEFT EAR: ICD-10-CM

## 2023-10-30 DIAGNOSIS — H93.11 TINNITUS OF RIGHT EAR: ICD-10-CM

## 2023-10-30 DIAGNOSIS — H90.3 SENSORINEURAL HEARING LOSS, BILATERAL: Primary | ICD-10-CM

## 2023-10-30 DIAGNOSIS — H90.3 ASYMMETRICAL SENSORINEURAL HEARING LOSS: Primary | ICD-10-CM

## 2023-10-30 DIAGNOSIS — H90.3 ASYMMETRICAL SENSORINEURAL HEARING LOSS: ICD-10-CM

## 2023-10-30 DIAGNOSIS — H91.21 SUDDEN IDIOPATHIC HEARING LOSS OF RIGHT EAR WITH RESTRICTED HEARING OF LEFT EAR: ICD-10-CM

## 2023-10-30 PROCEDURE — 92504 EAR MICROSCOPY EXAMINATION: CPT | Performed by: OTOLARYNGOLOGY

## 2023-10-30 PROCEDURE — 99204 OFFICE O/P NEW MOD 45 MIN: CPT | Performed by: OTOLARYNGOLOGY

## 2023-10-30 PROCEDURE — 92557 COMPREHENSIVE HEARING TEST: CPT | Performed by: AUDIOLOGIST

## 2023-10-30 PROCEDURE — 92550 TYMPANOMETRY & REFLEX THRESH: CPT | Performed by: AUDIOLOGIST

## 2023-10-30 PROCEDURE — 99207 PR NO CHARGE LOS: CPT | Performed by: AUDIOLOGIST

## 2023-10-30 ASSESSMENT — PAIN SCALES - GENERAL: PAINLEVEL: NO PAIN (0)

## 2023-10-30 NOTE — LETTER
10/30/2023         RE: Lyle Tenorio  [address]  [place] [address]        Dear Colleague,    Thank you for referring your patient, Lyle Tenorio, to the [facility]. Please see a copy of my visit note below.    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  I am seeing this patient in consultation for hearing difficulty at the request of the provider Dr. Cullen . The patient reports whelping a sudden hearing change in March 2023 after he had COVID.  His hearing suddenly went down on the right and his ear felt plugged.  He said this happened to him for with flying but usually this resolves from a hearing and plugging standpoint.  He currently denies any otalgia, otorrhea, bloody otorrhea.  No dizziness or vertigo.  No prior history of ear surgery.  He had some noise exposure history with prior firearm use.  No significant heavy machinery or farm equipment.  He is a right-handed firearm user, most always uses hearing protection.     Past Medical History  Patient Active Problem List   Diagnosis     Advanced care planning/counseling discussion     Cerebral embolism with cerebral infarction (H)     Hyperlipidemia LDL goal <100     Essential hypertension with goal blood pressure less than 140/90     CVA (cerebral vascular accident) (H)     MADDY (obstructive sleep apnea)     Generalized anxiety disorder     Morbid obesity (H)     Type 2 diabetes mellitus without complication, without long-term current use of insulin (H)     Chronic gout of right ankle, unspecified cause     Gastroesophageal reflux disease without esophagitis     Current Medications     Current Outpatient Medications:      allopurinol (ZYLOPRIM) 300  MG tablet, Take 1.5 tablets (450 mg) by mouth daily, Disp: 135 tablet, Rfl: 3     Ascorbic Acid (VITAMIN C PO), Take 1,000 mg by mouth , Disp: , Rfl:      ASPIRIN NOT PRESCRIBED, INTENTIONAL,, Antiplatelet medication not prescribed intentionally due to Plavix, Disp: , Rfl: 0     atorvastatin (LIPITOR) 20 MG tablet, Take 1 tablet (20 mg) by mouth daily, Disp: 90 tablet, Rfl: 3     blood glucose (ACCU-CHEK GUIDE) test strip, Use to test blood sugar 3 times daily or as directed., Disp: 100 each, Rfl: 11     blood glucose monitoring (ACCU-CHEK FASTCLIX) lancets, Use to test blood sugar 3 times daily or as directed.  Ok to substitute alternative if insurance prefers., Disp: 102 each, Rfl: 11     Cholecalciferol (VITAMIN D) 2000 UNITS tablet, Take 2,000 Units by mouth daily. (Patient taking differently: Take 1,000 Units by mouth daily), Disp: 100 tablet, Rfl: 3     clopidogrel (PLAVIX) 75 MG tablet, Take 1 tablet (75 mg) by mouth daily, Disp: 90 tablet, Rfl: 3     cyanocobalamin (B-12) 1000 MCG TBCR, Take 1,000 mcg by mouth daily., Disp: 100 tablet, Rfl: 1     IBUPROFEN PO, Take 400-600 mg by mouth as needed.  , Disp: , Rfl:      indomethacin (INDOCIN) 50 MG capsule, TAKE 1 CAPSULE BY MOUTH THREE TIMES DAILY WITH MEALS, Disp: 30 capsule, Rfl: 3     LANsoprazole (PREVACID) 30 MG DR capsule, Take 1 capsule by mouth once daily, Disp: 90 capsule, Rfl: 3     lisinopril (ZESTRIL) 2.5 MG tablet, Take 1 tablet (2.5 mg) by mouth daily, Disp: 90 tablet, Rfl: 3     mupirocin (BACTROBAN) 2 % external ointment, Apply topically 3 times daily 10 days for skin sores., Disp: 22 g, Rfl: 0     order for DME,  HEATED HUMIDITY  CHIN STRAP  NON-DSPOSABLE FILTERS  DISPOSABLE FILTERS (2 PER 1 MONTHS)  NASAL PILLOW (2 PER 1 MONTHS)  FULL FACE MASK  (1 PER 3 MONTHS)  FULL FACE CUSHION  (1 PER 1 MONTHS)  HUMIDIFIER CHAMBER (1 PER 6 MONTHS)  MASK (1 PER 3 MONTHS)  NASAL CUSHION (2 PER 1 MONTHS)   HEADGEAR (1 PER 6 MONTHS)  TUBING (1 PER 3 MONTHS)  HEATED TUBING (1 PER 3 MONTHS), Disp: 1 Device, Rfl: 1     ORDER FOR DME, Pt was set up on a Respironics RemStar 60 Series Auto AFlex 5-15 cm H2O with heated humidity and a modem. Pt chose a Mirage FX nasal mask standard size., Disp: , Rfl:     Allergies  Allergies   Allergen Reactions     Nka [No Known Allergies]        Social History   Social History     Socioeconomic History     Marital status: Single   Tobacco Use     Smoking status: Former     Packs/day: 1.00     Years: 25.00     Additional pack years: 0.00     Total pack years: 25.00     Types: Cigarettes     Quit date: 3/28/2010     Years since quittin.5     Passive exposure: Past     Smokeless tobacco: Never   Vaping Use     Vaping Use: Never used   Substance and Sexual Activity     Alcohol use: No     Alcohol/week: 5.0 standard drinks of alcohol     Types: 6 Standard drinks or equivalent per week     Drug use: No     Sexual activity: Yes     Partners: Female   Other Topics Concern     Parent/sibling w/ CABG, MI or angioplasty before 65F 55M? Yes     Comment: mother-late 50's, mGF mid 50's RIP       Family History  Family History   Problem Relation Age of Onset     Heart Disease Mother         MI late 50's age     Diabetes Father      Blood Disease Father         clots     Breast Cancer Maternal Grandmother      Heart Disease Maternal Grandfather      Alcohol/Drug Paternal Grandfather      Blood Disease Brother         clots-antiptrip     Diabetes Brother 52        Type II       Review of Systems  As per HPI and PMHx, otherwise 10+ comprehensive system review is negative.    Physical Exam  BP (!) 163/90 (BP Location: Right arm, Patient Position: Chair, Cuff Size: Adult Large)   Pulse 80   Temp 98  F (36.7  C) (Tympanic)   Wt 127 kg (280 lb)   BMI 39.05 kg/m    GENERAL: Patient is a pleasant, cooperative 58 year old male in no acute distress.  HEAD: Normocephalic, atraumatic.  Hair and  scalp are normal.  EYES: Pupils are equal, round, reactive to light and accommodation.  Extraocular movements are intact.  The sclera nonicteric without injection.  The extraocular structures are normal.  EARS: See below.    NOSE: Nares are patent.  Nasal mucosa is pink and moist.  Negative anterior rhinoscopy.    NEUROLOGIC: Cranial nerves II through XII are grossly intact.  Voice is strong.  Patient is House-Brackmann I/VI bilaterally.  CARDIOVASCULAR: Extremities are warm and well-perfused.  No significant peripheral edema.  RESPIRATORY: Patient has nonlabored breathing without cough, wheeze, stridor.  PSYCHIATRIC: Patient is alert and oriented.  Mood and affect appear normal.  SKIN: Warm and dry.  No scalp, face, or neck lesions noted.    Physical Exam and Procedure    EARS: Normal shape and symmetry.  No tenderness when palpating the mastoid or tragal areas bilaterally.  The ears were then examined under the otic binocular microscope to perform cerumen removal.  Otoscopic exam on the right reveals a clear canal.  The right tympanic membrane was round, intact without evidence of effusion.  No retraction, granulation, drainage, or evidence of cholesteatoma.      Attention was turned to the left ear.  Otoscopic exam on the left reveals impacted cerumen down to the level of the tympanic membrane.  The cerumen was removed with a #7 suction.  The left tympanic membrane was round, intact without evidence of effusion.  No retraction, granulation, drainage, or evidence of cholesteatoma.      Audiogram  The patient underwent an audiogram performed today.  My review of the audiogram shows normal sloping to moderate sloping to mild sloping to moderate sensorineural hearing loss in the right ear and borderline normal to 3000 Hz sloping to mild sloping to moderate sensorineural hearing loss in the left ear.  Pure-tone average is 43 dB on the right and 17 dB on the left.  Speech reception threshold is 35 dB on the right and  25 dB on the left.  The patient had 84% word recognition on the right and 100% word recognition on the left.  The patient had a type A tympanogram on the right and a type A deep tympanogram on the left.      Assessment and Plan     ICD-10-CM    1. Sensorineural hearing loss, bilateral  H90.3 Microscopy, Binocular      2. Asymmetrical sensorineural hearing loss  H90.3 Microscopy, Binocular      3. Sudden idiopathic hearing loss of right ear with restricted hearing of left ear  H91.21 Adult Audiology  Referral     MR Brain w/o & w Contrast     Adult ENT  Referral     Microscopy, Binocular      4. Impacted cerumen of left ear  H61.22 Microscopy, Binocular        It was my pleasure seeing Lyle Tenorio today in clinic.  The patient presents today with fairly significant asymmetric hearing loss in the right ear that was relatively sudden in nature about 7 or 8 months ago.  I would recommend an MRI with IAC protocol to rule out retrocochlear pathology.  I will contact the patient with the MRI results when available.  We spent the remainder of today's visit on education. We discussed hearing protection in noisy environments.    The patient is medically cleared for consideration of a hearing aid evaluation.    The patient will follow up as necessary for worsening symptoms or changes in symptoms. I have also recommended repeat audiogram in 1-3 years, or sooner if symptoms warrant.      Lyle to follow up with Primary Care provider regarding elevated blood pressure.    Kiko Hardwick MD  Department of Otolaryngology-Head and Neck Surgery  [facility]       Again, thank you for allowing me to participate in the care of your patient.        Sincerely,        Kiko Hardwick MD

## 2023-10-30 NOTE — PROGRESS NOTES
"AUDIOLOGY REPORT:    Patient was referred from ENT by Dr. Hardwick for audiology evaluation. The patient reports that he had Covid in March of this year and his right ear \"went dead\" at one point. He reports that he had the same thing happen several years ago after flying with a cold, and his ear did not go back to normal until the flight home. The patient reports decreased hearing in the right ear. He has been having popping in the right ear when he blows his nose. He also reports constant tinnitus in the right ear that started in March as well. The patient reports that he had ear infections as a child but has not had ear tubes. He reports that he had a stroke in 2012 and uses a CPAP. He reports a history of loud noise exposure and a family history of hearing loss with age.    Testing:    Otoscopy:   Otoscopic exam indicates cerumen in the left ear. The right ear is clear.    Tympanograms:    RIGHT: normal eardrum mobility     LEFT:   hypercompliant eardrum mobility    Reflexes (reported by stimulus ear):  RIGHT: Ipsilateral is present at normal levels  RIGHT: Contralateral is present at normal levels  LEFT:   Ipsilateral is present at normal levels  LEFT:   Contralateral is present at normal levels    Thresholds:   Pure Tone Thresholds assessed using conventional audiometry with good reliability from 250-8000 Hz bilaterally using insert earphones and circumaural headphones     RIGHT:  normal hearing sensitivity at 250 Hz sloping to moderate to mild sloping to moderate sensorineural hearing loss    LEFT:    normal hearing sensitivity through 4000 Hz sloping to mild to moderately severe likely sensorineural hearing loss  NOTE: significant improvement in both ears with circumaural phones    Speech Reception Threshold:    RIGHT: 35 dB HL    LEFT:   25 dB HL  Results are in agreement with pure tone average.     Word Recognition Score:     RIGHT: 84% at 80 dB HL using NU-6 recorded word list.    LEFT:   100% at 70 dB HL " using NU-6 recorded word list.  NOTE: Right ear retested with an insert phone. An initial score of 56% was obtained with circumaural phones. Binaural word recognition tested with circumaural phones, with a score of 100%.    Discussed results with the patient.     Patient was returned to ENT for follow up.     Zunilda Kilgore, CCC-A  Licensed Audiologist #[id number]  10/30/2023

## 2023-10-30 NOTE — NURSING NOTE
"Initial BP (!) 163/90 (BP Location: Right arm, Patient Position: Chair, Cuff Size: Adult Large)   Pulse 80   Temp 98  F (36.7  C) (Tympanic)   Wt 127 kg (280 lb)   BMI 39.05 kg/m   Estimated body mass index is 39.05 kg/m  as calculated from the following:    Height as of 5/4/23: 1.803 m (5' 11\").    Weight as of this encounter: 127 kg (280 lb). .  Rosalia Baptiste LPN    "

## 2023-10-31 ENCOUNTER — HOSPITAL ENCOUNTER (OUTPATIENT)
Dept: MRI IMAGING | Facility: CLINIC | Age: 58
Discharge: HOME | End: 2023-10-31
Attending: OTOLARYNGOLOGY | Admitting: OTOLARYNGOLOGY
Payer: COMMERCIAL

## 2023-10-31 DIAGNOSIS — H91.21 SUDDEN IDIOPATHIC HEARING LOSS OF RIGHT EAR WITH RESTRICTED HEARING OF LEFT EAR: ICD-10-CM

## 2023-10-31 PROCEDURE — 70553 MRI BRAIN STEM W/O & W/DYE: CPT

## 2023-10-31 PROCEDURE — A9585 GADOBUTROL INJECTION: HCPCS | Performed by: OTOLARYNGOLOGY

## 2023-10-31 PROCEDURE — 255N000002 HC RX 255 OP 636: Performed by: OTOLARYNGOLOGY

## 2023-10-31 RX ORDER — GADOBUTROL 604.72 MG/ML
12 INJECTION INTRAVENOUS ONCE
Status: COMPLETED | OUTPATIENT
Start: 2023-10-31 | End: 2023-10-31

## 2023-10-31 RX ADMIN — GADOBUTROL 12 ML: 604.72 INJECTION INTRAVENOUS at 07:19

## 2023-11-13 ENCOUNTER — TELEPHONE (OUTPATIENT)
Dept: FAMILY MEDICINE | Facility: CLINIC | Age: 58
End: 2023-11-13
Payer: COMMERCIAL

## 2023-11-13 DIAGNOSIS — G47.33 OSA (OBSTRUCTIVE SLEEP APNEA): Primary | ICD-10-CM

## 2023-11-13 NOTE — TELEPHONE ENCOUNTER
Patient calling because he is needing a new CPAP machine. States his is over 12 years old and now whistling from the seals. Patient is wondering how to go about getting a new machine.     Patient would like orders to be sent to [facility] in [place]. Fax: [phone] Phone: [phone]    Where to send orders: Place orders within Epic    Could we send this information to you in [facility] or would you prefer to receive a phone call?:   Patient would prefer a phone call   Okay to leave a detailed message?: Yes at Home number on file [phone] ([place])

## 2023-12-13 ENCOUNTER — OFFICE VISIT (OUTPATIENT)
Dept: AUDIOLOGY | Facility: CLINIC | Age: 58
End: 2023-12-13
Payer: COMMERCIAL

## 2023-12-13 DIAGNOSIS — H91.21 SUDDEN IDIOPATHIC HEARING LOSS OF RIGHT EAR WITH RESTRICTED HEARING OF LEFT EAR: ICD-10-CM

## 2023-12-13 DIAGNOSIS — H90.3 SENSORINEURAL HEARING LOSS, ASYMMETRICAL: Primary | ICD-10-CM

## 2023-12-13 PROCEDURE — 92590 PR HEARING AID EXAM MONAURAL: CPT | Mod: RT | Performed by: AUDIOLOGIST

## 2023-12-13 NOTE — PROGRESS NOTES
AUDIOLOGY REPORT    SUBJECTIVE: Lyle Tenorio is a 58 year old male was seen in the Audiology Clinic at  [facility] on 12/13/23 to discuss concerns with hearing and functional communication difficulties. The patient was accompanied by their self. Lyle has been seen previously on 10/30/2023, and results revealed a asymmetrical sensorineural hearing loss bilaterally.  The patient was medically evaluated and determined to be cleared for a right hearing aid by Dr. Hardwick. Lyle notes difficulty with communication in a variety of listening situations.    OBJECTIVE:    Patient is a hearing aid candidate. Patient would like to move forward with a hearing aid evaluation today. Therefore, the patient was presented with different options for amplification to help aid in communication. Discussed styles, levels of technology and monaural vs. binaural fitting.     The hearing aid(s) mutually chosen were:  Right: Phonak Audeo L70-R  COLOR: P8 Velvet black  BATTERY SIZE: rechargeable  CANAL/ LENGTH: M  5 2R    Signed Waiver on file. GA modifier.      ASSESSMENT:     ICD-10-CM    1. Sudden idiopathic hearing loss of right ear with restricted hearing of left ear  H91.21 Adult Audiology  Referral          Reviewed purchase information and warranty information with patient. The 45 day trial period was explained to patient. The patient was given a copy of the [facility] consumer brochure on purchasing hearing instruments. Patient risk factors have been provided to the patient in writing prior to the sale of the hearing aid per FDA regulation. The risk factors are also available in the User Instructional Booklet to be presented on the day of the hearing aid fitting. Hearing aid(s) ordered. Hearing aid evaluation completed.    PLAN: Lyle is scheduled to return in 2-3 weeks for a hearing aid fitting and programming. Purchase agreement will be completed on that date. Please  contact this clinic with any questions or concerns.      Susanne Mancini M.A. -[facility], #[id number]

## 2024-01-24 ENCOUNTER — OFFICE VISIT (OUTPATIENT)
Dept: AUDIOLOGY | Facility: CLINIC | Age: 59
End: 2024-01-24
Payer: COMMERCIAL

## 2024-01-24 DIAGNOSIS — H90.3 SENSORINEURAL HEARING LOSS, ASYMMETRICAL: Primary | ICD-10-CM

## 2024-01-24 PROCEDURE — V5257 HEARING AID, DIGIT, MON, BTE: HCPCS | Mod: GK | Performed by: AUDIOLOGIST

## 2024-01-24 PROCEDURE — V5257 HEARING AID, DIGIT, MON, BTE: HCPCS | Mod: GA | Performed by: AUDIOLOGIST

## 2024-01-24 PROCEDURE — 92592 PR HEARING AID CHECK, MONAURAL: CPT | Mod: RT | Performed by: AUDIOLOGIST

## 2024-01-24 PROCEDURE — V5241 DISPENSING FEE, MONAURAL: HCPCS | Mod: RT | Performed by: AUDIOLOGIST

## 2024-01-24 PROCEDURE — V5011 HEARING AID FITTING/CHECKING: HCPCS | Performed by: AUDIOLOGIST

## 2024-01-24 PROCEDURE — V5020 CONFORMITY EVALUATION: HCPCS | Performed by: AUDIOLOGIST

## 2024-01-24 NOTE — PROGRESS NOTES
AUDIOLOGY REPORT    SUBJECTIVE: Lyle Tenorio, a 58 year old male, was seen in the Audiology Clinic at [facility] today for a Right hearing aid fitting. Previous results have revealed a bilateral asymmetrical  sensorineural hearing loss. The patient was given medical clearance to pursue amplification by  Kiko Hardwick MD..      OBJECTIVE:  Prior to fitting, a hearing aid check was performed to ensure device functionality. The hearing aid conformity evaluation was completed.The hearing aids were placed and they provided a good fit. Real-ear-probe-microphone measurements were completed on the [facility] system and were a good match to NAL-NL2 target with soft sounds audible, moderate sounds comfortable, and loud sounds below discomfort. UCLs are verified through maximum power output measures and demonstrate appropriate limiting of loud inputs. Mr. Tenorio was oriented to proper hearing aid use, care, cleaning (no water, dry brush), batteries (rechargeable, insertion/removal, toxicity, low-battery signal), aid insertion/removal, user booklet, warranty information, storage cases, and other hearing aid details. The patient confirmed understanding of hearing aid use and care, and showed proper insertion of hearing aid and batteries while in the office today. Mr. Tenorio reported good volume and sound quality today.    EAR(S) FIT: Right  MA HEARING AID MAKE: Right: Phonak Audeo L70-R  HEARING AID STYLE: Right: JODI   DOME SIZE: Right:  medium vented   LENGTH: Right:  M  5 2R  SERIAL NUMBERS: Right: [id number]  WARRANTY END DATE: Right: 3/12/2027      (The patient Requires a signed a waiver that is on file agreeing to the upgrade charge, per their insurance contract. )        ASSESSMENT: Right ear  hearing aid fitting completed today. Verification measures were performed. The 45 day trial period was explained to patient, and they expressed understanding. Mr. Tenorio signed the Hearing  Aid Purchase Agreement and was given a copy, as well as details on his hearing aids. Patient was counseled that exact out of pocket amounts cannot be determined for hearing aid claims being sent to insurance. Any insurance coverage information presented to the patient is an estimate only, and is not a guarantee of payment. Patient has been advised to check with their own insurance.    PLAN: Mr. Tenorio will return for follow-up in 3-4 weeks for a hearing aid review appointment. Please call this clinic with questions regarding today s appointment.    Susanne MANN-FAVIOLA, #[id number]

## 2024-01-24 NOTE — PATIENT INSTRUCTIONS

## 2024-01-28 ENCOUNTER — HEALTH MAINTENANCE LETTER (OUTPATIENT)
Age: 59
End: 2024-01-28

## 2024-02-20 ENCOUNTER — OFFICE VISIT (OUTPATIENT)
Dept: AUDIOLOGY | Facility: CLINIC | Age: 59
End: 2024-02-20
Payer: COMMERCIAL

## 2024-02-20 DIAGNOSIS — H93.11 TINNITUS OF RIGHT EAR: ICD-10-CM

## 2024-02-20 DIAGNOSIS — H90.3 SENSORINEURAL HEARING LOSS, ASYMMETRICAL: Primary | ICD-10-CM

## 2024-02-20 PROCEDURE — V5299 HEARING SERVICE: HCPCS | Performed by: AUDIOLOGIST

## 2024-02-20 NOTE — PROGRESS NOTES
AUDIOLOGY REPORT    SUBJECTIVE:Lyle Tenorio is a 58 year old male who was seen in the Audiology Clinic at the [facility] on 2/20/2024  for a follow-up check regarding the fitting of new hearing aids. Previous results have revealed an asymmetrical sensorineural hearing loss bilaterally.  The patient has been seen previously in this clinic and was fit with a right Phonak Audeo L70-R hearing aid on 1/24/2024.  Lyle reports good sound quality with the hearing aid(s). He reports he is having difficulty with retention and fit of dome.     OBJECTIVE:   The International Outcome Inventory-Hearing Aids (IOI-HA) was administered today.The patient s responses to the 7 questions can be compared to normative data relative to how others are performing with their hearing aids, as well as focusing audiologic care and counseling.This patient s Quality of Life score (Question 7) was 4, which is above normative average.     Based on patient report, the following changes were made;took ear impression  for custom slim tip.    Reviewed 45 day trial period, care, cleaning (no water, dry brush), batteries (rechargeable) insertion/removal, toxicity, low-battery signal), aid insertion/removal, volume adjustment (if applicable), user booklet, warranty information, storage cases, and other hearing aid details.       No charge visit today (in warranty hearing aid check).     ASSESSMENT: A follow-up appointment for hearing aid fitting was completed today. IOI-HA administered today. Changes to hearing aid was completed as outlined above.     PLAN:Lyle will return in 2-3 weeks for fitting of his custom slim tip. Please call this clinic with any questions regarding today s appointment.    Susanne MANN-[facility], #[id number]

## 2024-03-13 ENCOUNTER — OFFICE VISIT (OUTPATIENT)
Dept: AUDIOLOGY | Facility: CLINIC | Age: 59
End: 2024-03-13
Payer: COMMERCIAL

## 2024-03-13 DIAGNOSIS — H90.3 SENSORINEURAL HEARING LOSS, ASYMMETRICAL: Primary | ICD-10-CM

## 2024-03-13 DIAGNOSIS — H93.11 TINNITUS OF RIGHT EAR: ICD-10-CM

## 2024-03-13 PROCEDURE — V5264 EAR MOLD/INSERT: HCPCS | Mod: RT | Performed by: AUDIOLOGIST

## 2024-03-13 NOTE — PROGRESS NOTES
[facility]        SUBJECTIVE:  Lyle Tenorio , 58 year old male comes in for fitting of his new right acrylic slim tip. He is currently wearing a right ear only Phonak Audeo L70-R hearing aid.     OBJECTIVE:  Fit with custom slim tip. Patient reports it fits well and feels much more secure. He states he likes the feel of the slim tip more than the dome.     Reviewed need to return to clinic if any fit or comfort issues.     ASSESSMENT/PLAN:    Return to clinic as needed.     Susanne Mancini M.A. F-FAVIOLA, #[id number]

## 2024-03-25 ENCOUNTER — TRANSFERRED RECORDS (OUTPATIENT)
Dept: HEALTH INFORMATION MANAGEMENT | Facility: CLINIC | Age: 59
End: 2024-03-25
Payer: COMMERCIAL

## 2024-03-25 LAB — RETINOPATHY: NEGATIVE

## 2024-03-26 ENCOUNTER — TELEPHONE (OUTPATIENT)
Dept: FAMILY MEDICINE | Facility: CLINIC | Age: 59
End: 2024-03-26
Payer: COMMERCIAL

## 2024-03-26 NOTE — TELEPHONE ENCOUNTER
Patient Quality Outreach    Patient is due for the following:   Diabetes -  A1C, Eye Exam, and Foot Exam    Next Steps:   Chart routed to abstraction.      Type of outreach:    Chart review performed, no outreach needed.  Eye exam abstracted     Questions for provider review:    None           Rocio George CMA

## 2024-03-27 ENCOUNTER — TELEPHONE (OUTPATIENT)
Dept: AUDIOLOGY | Facility: CLINIC | Age: 59
End: 2024-03-27
Payer: COMMERCIAL

## 2024-03-27 NOTE — TELEPHONE ENCOUNTER
Reason for Call:  Form, our goal is to have forms completed with 72 hours, however, some forms may require a visit or additional information.    Type of letter, form or note:  disability    Who is the form from?: Patient    Where did the form come from: Patient or family brought in       What clinic location was the form placed at?: [facility] (ENT, Neurology, Rheumatology, Surgery, Urology, Vascular Surgery)    Where the form was placed:  [facility] Box/Folder    What number is listed as a contact on the form?: [phone]       Additional comments: Patient brought in form from [employer], please fill out and call patient once completed.    Thank you,  Trixie Adan  [facility]  Specialty [facility] - [facility]      Call taken on 3/27/2024 at 9:23 AM by Trixie Adan

## 2024-03-28 NOTE — TELEPHONE ENCOUNTER
Form completed with limited suggestions per Dr. Bernardo, and put at AFR desk for pick-up.    Thank you,  Trixie PATEL [facility]

## 2024-04-04 ENCOUNTER — TELEPHONE (OUTPATIENT)
Dept: AUDIOLOGY | Facility: CLINIC | Age: 59
End: 2024-04-04
Payer: COMMERCIAL

## 2024-04-04 NOTE — TELEPHONE ENCOUNTER
Reason for Call:  Form, our goal is to have forms completed with 72 hours, however, some forms may require a visit or additional information.    Type of letter, form or note:   Work recommendations    Who is the form from?: Employer    Where did the form come from: Patient or family brought in       What clinic location was the form placed at?: [facility] (ENT, Neurology, Rheumatology, Surgery, Urology, Vascular Surgery)    Where the form was placed:  Form in Audiology office Box/Folder    What number is listed as a contact on the form?: [phone]      Additional comments: Patient dropped of a form for work recommendations for wearing hearing aid. Form put in Audiologist's office. Please call patient once completed.    Thank you,  Trixie PATEL [facility]  Specialty [facility] - [facility]      Call taken on 4/4/2024 at 2:59 PM by Trixie Adan

## 2024-04-11 NOTE — TELEPHONE ENCOUNTER
Reason for Call:  Other Forms    Detailed comments: Received completed forms from Audiologist Susanne Mancini. Called pt to see how he would like to receive them if he would be picking up or if he would like us to fax or mail them.     Phone Number Patient can be reached at: Cell number on file:    Telephone Information:   Mobile [phone]       Best Time:       Can we leave a detailed message on this number? YES    Call taken on 4/11/2024 at 12:27 PM by Amy Pickering MA

## 2024-04-11 NOTE — TELEPHONE ENCOUNTER
Forms are completed and are at the  for .     Suyapa Stanley   [facility] [place]   [facility]  [phone]

## 2024-04-24 DIAGNOSIS — E78.5 HYPERLIPIDEMIA LDL GOAL <100: ICD-10-CM

## 2024-04-25 RX ORDER — ATORVASTATIN CALCIUM 20 MG/1
20 TABLET, FILM COATED ORAL DAILY
Qty: 90 TABLET | Refills: 0 | Status: SHIPPED | OUTPATIENT
Start: 2024-04-25 | End: 2024-05-09

## 2024-04-30 ENCOUNTER — TELEPHONE (OUTPATIENT)
Dept: FAMILY MEDICINE | Facility: CLINIC | Age: 59
End: 2024-04-30
Payer: COMMERCIAL

## 2024-04-30 NOTE — LETTER
April 30, 2024            Lyle Tenorio  [address] [facility]  [place] [address]    Your team at [facility] cares about your health. We have reviewed your chart and based on our findings; we are making the following recommendations to better manage your health.     You are in particular need of attention regarding the following:     HYPERTENSION FOLLOW UP: Office Visit    If you have already completed these items, please contact the clinic via phone or   MyChart so your care team can review and update your records. Thank you for   choosing [facility] for your healthcare needs. For any questions,   concerns, or to schedule an appointment please contact our clinic.    Healthy Regards,      Your [facility] Care Team

## 2024-04-30 NOTE — TELEPHONE ENCOUNTER
Patient Quality Outreach    Patient is due for the following:   Hypertension -  Hypertension follow-up visit    Next Steps:   Schedule a office visit for HTN    Type of outreach:    Phone, left message for patient/parent to call back.      Questions for provider review:    None           Rocio George CMA

## 2024-05-02 DIAGNOSIS — Z86.73 H/O: STROKE: ICD-10-CM

## 2024-05-02 DIAGNOSIS — K21.9 GASTROESOPHAGEAL REFLUX DISEASE WITHOUT ESOPHAGITIS: ICD-10-CM

## 2024-05-02 RX ORDER — LANSOPRAZOLE 30 MG/1
CAPSULE, DELAYED RELEASE ORAL
Qty: 90 CAPSULE | Refills: 0 | Status: SHIPPED | OUTPATIENT
Start: 2024-05-02 | End: 2024-05-09

## 2024-05-02 RX ORDER — CLOPIDOGREL BISULFATE 75 MG/1
75 TABLET ORAL DAILY
Qty: 90 TABLET | Refills: 0 | Status: SHIPPED | OUTPATIENT
Start: 2024-05-02 | End: 2024-05-09

## 2024-05-09 ENCOUNTER — OFFICE VISIT (OUTPATIENT)
Dept: FAMILY MEDICINE | Facility: CLINIC | Age: 59
End: 2024-05-09
Payer: COMMERCIAL

## 2024-05-09 VITALS
WEIGHT: 282 LBS | BODY MASS INDEX: 39.48 KG/M2 | OXYGEN SATURATION: 97 % | TEMPERATURE: 98.4 F | HEIGHT: 71 IN | RESPIRATION RATE: 18 BRPM | DIASTOLIC BLOOD PRESSURE: 78 MMHG | SYSTOLIC BLOOD PRESSURE: 160 MMHG | HEART RATE: 80 BPM

## 2024-05-09 DIAGNOSIS — E78.5 HYPERLIPIDEMIA LDL GOAL <100: ICD-10-CM

## 2024-05-09 DIAGNOSIS — E66.01 MORBID OBESITY (H): ICD-10-CM

## 2024-05-09 DIAGNOSIS — Z86.73 H/O: STROKE: ICD-10-CM

## 2024-05-09 DIAGNOSIS — M1A.0710 CHRONIC GOUT OF RIGHT ANKLE, UNSPECIFIED CAUSE: ICD-10-CM

## 2024-05-09 DIAGNOSIS — I10 ESSENTIAL HYPERTENSION WITH GOAL BLOOD PRESSURE LESS THAN 140/90: ICD-10-CM

## 2024-05-09 DIAGNOSIS — E11.9 TYPE 2 DIABETES MELLITUS WITHOUT COMPLICATION, WITHOUT LONG-TERM CURRENT USE OF INSULIN (H): ICD-10-CM

## 2024-05-09 DIAGNOSIS — Z00.00 ROUTINE GENERAL MEDICAL EXAMINATION AT A HEALTH CARE FACILITY: Primary | ICD-10-CM

## 2024-05-09 DIAGNOSIS — Z12.5 SCREENING FOR PROSTATE CANCER: ICD-10-CM

## 2024-05-09 DIAGNOSIS — K21.9 GASTROESOPHAGEAL REFLUX DISEASE WITHOUT ESOPHAGITIS: ICD-10-CM

## 2024-05-09 LAB
ANION GAP SERPL CALCULATED.3IONS-SCNC: 15 MMOL/L (ref 7–15)
BUN SERPL-MCNC: 8.6 MG/DL (ref 6–20)
CALCIUM SERPL-MCNC: 9.7 MG/DL (ref 8.6–10)
CHLORIDE SERPL-SCNC: 99 MMOL/L (ref 98–107)
CHOLEST SERPL-MCNC: 106 MG/DL
CREAT SERPL-MCNC: 0.82 MG/DL (ref 0.67–1.17)
CREAT UR-MCNC: 97.3 MG/DL
DEPRECATED HCO3 PLAS-SCNC: 24 MMOL/L (ref 22–29)
EGFRCR SERPLBLD CKD-EPI 2021: >90 ML/MIN/1.73M2
FASTING STATUS PATIENT QL REPORTED: YES
FASTING STATUS PATIENT QL REPORTED: YES
GLUCOSE SERPL-MCNC: 434 MG/DL (ref 70–99)
HBA1C MFR BLD: 11.7 % (ref 0–5.6)
HDLC SERPL-MCNC: 32 MG/DL
LDLC SERPL CALC-MCNC: 47 MG/DL
MICROALBUMIN UR-MCNC: 21.2 MG/L
MICROALBUMIN/CREAT UR: 21.79 MG/G CR (ref 0–17)
NONHDLC SERPL-MCNC: 74 MG/DL
POTASSIUM SERPL-SCNC: 4.6 MMOL/L (ref 3.4–5.3)
PSA SERPL DL<=0.01 NG/ML-MCNC: 0.14 NG/ML (ref 0–3.5)
SODIUM SERPL-SCNC: 138 MMOL/L (ref 135–145)
TRIGL SERPL-MCNC: 137 MG/DL
URATE SERPL-MCNC: 4.2 MG/DL (ref 3.4–7)

## 2024-05-09 PROCEDURE — 99396 PREV VISIT EST AGE 40-64: CPT | Performed by: FAMILY MEDICINE

## 2024-05-09 PROCEDURE — 99214 OFFICE O/P EST MOD 30 MIN: CPT | Mod: 25 | Performed by: FAMILY MEDICINE

## 2024-05-09 PROCEDURE — 83036 HEMOGLOBIN GLYCOSYLATED A1C: CPT | Performed by: FAMILY MEDICINE

## 2024-05-09 PROCEDURE — 82570 ASSAY OF URINE CREATININE: CPT | Performed by: FAMILY MEDICINE

## 2024-05-09 PROCEDURE — 84550 ASSAY OF BLOOD/URIC ACID: CPT | Performed by: FAMILY MEDICINE

## 2024-05-09 PROCEDURE — 80061 LIPID PANEL: CPT | Performed by: FAMILY MEDICINE

## 2024-05-09 PROCEDURE — 36415 COLL VENOUS BLD VENIPUNCTURE: CPT | Performed by: FAMILY MEDICINE

## 2024-05-09 PROCEDURE — 80048 BASIC METABOLIC PNL TOTAL CA: CPT | Performed by: FAMILY MEDICINE

## 2024-05-09 PROCEDURE — G0103 PSA SCREENING: HCPCS | Performed by: FAMILY MEDICINE

## 2024-05-09 PROCEDURE — 82043 UR ALBUMIN QUANTITATIVE: CPT | Performed by: FAMILY MEDICINE

## 2024-05-09 RX ORDER — ATORVASTATIN CALCIUM 20 MG/1
20 TABLET, FILM COATED ORAL DAILY
Qty: 90 TABLET | Refills: 3 | Status: SHIPPED | OUTPATIENT
Start: 2024-05-09

## 2024-05-09 RX ORDER — LANSOPRAZOLE 30 MG/1
CAPSULE, DELAYED RELEASE ORAL
Qty: 90 CAPSULE | Refills: 3 | Status: SHIPPED | OUTPATIENT
Start: 2024-05-09

## 2024-05-09 RX ORDER — HEARING AID ACCESSORY
MISCELLANEOUS MISCELLANEOUS
COMMUNITY

## 2024-05-09 RX ORDER — LISINOPRIL 2.5 MG/1
2.5 TABLET ORAL DAILY
Qty: 90 TABLET | Refills: 3 | Status: SHIPPED | OUTPATIENT
Start: 2024-05-09

## 2024-05-09 RX ORDER — INDOMETHACIN 50 MG/1
CAPSULE ORAL
Qty: 30 CAPSULE | Refills: 3 | Status: SHIPPED | OUTPATIENT
Start: 2024-05-09

## 2024-05-09 RX ORDER — ALLOPURINOL 300 MG/1
450 TABLET ORAL DAILY
Qty: 135 TABLET | Refills: 3 | Status: SHIPPED | OUTPATIENT
Start: 2024-05-09

## 2024-05-09 RX ORDER — CLOPIDOGREL BISULFATE 75 MG/1
75 TABLET ORAL DAILY
Qty: 90 TABLET | Refills: 3 | Status: SHIPPED | OUTPATIENT
Start: 2024-05-09

## 2024-05-09 SDOH — HEALTH STABILITY: PHYSICAL HEALTH: ON AVERAGE, HOW MANY DAYS PER WEEK DO YOU ENGAGE IN MODERATE TO STRENUOUS EXERCISE (LIKE A BRISK WALK)?: 2 DAYS

## 2024-05-09 ASSESSMENT — SOCIAL DETERMINANTS OF HEALTH (SDOH): HOW OFTEN DO YOU GET TOGETHER WITH FRIENDS OR RELATIVES?: ONCE A WEEK

## 2024-05-09 ASSESSMENT — PAIN SCALES - GENERAL: PAINLEVEL: NO PAIN (0)

## 2024-05-09 NOTE — PROGRESS NOTES
"Preventive Care Visit  [facility]  Gurinder Cullen MD, Family Medicine  May 9, 2024      Assessment & Plan     Routine general medical examination at a health care facility  Blood pressure above target goal of less than 140/90.  Patient has not been monitoring blood glucose or blood pressure at home either, better compliance stressed.  Patient deferred pharmacotherapy, involved risks explained in detail.  Recommended regular exercise, healthy diet and weight loss    Type 2 diabetes mellitus without complication, without long-term current use of insulin (H)  - HEMOGLOBIN A1C; Future  - Lipid panel reflex to direct LDL Non-fasting; Future  - Albumin Random Urine Quantitative with Creat Ratio; Future  - lisinopril (ZESTRIL) 2.5 MG tablet; Take 1 tablet (2.5 mg) by mouth daily    Essential hypertension with goal blood pressure less than 140/90  - BASIC METABOLIC PANEL; Future    Chronic gout of right ankle, unspecified cause  - allopurinol (ZYLOPRIM) 300 MG tablet; Take 1.5 tablets (450 mg) by mouth daily  - indomethacin (INDOCIN) 50 MG capsule; TAKE 1 CAPSULE BY MOUTH THREE TIMES DAILY WITH MEALS    Hyperlipidemia LDL goal <100  - atorvastatin (LIPITOR) 20 MG tablet; Take 1 tablet (20 mg) by mouth daily    H/O: stroke  - clopidogrel (PLAVIX) 75 MG tablet; Take 1 tablet (75 mg) by mouth daily    Gastroesophageal reflux disease without esophagitis  - LANsoprazole (PREVACID) 30 MG DR capsule; Take 1 capsule by mouth once daily    Morbid obesity (H)  Healthy lifestyle modifications stressed including regular exercise, balanced diet, weight loss and limiting salt/caffeine/pop intake      BMI  Estimated body mass index is 39.33 kg/m  as calculated from the following:    Height as of this encounter: 1.803 m (5' 11\").    Weight as of this encounter: 127.9 kg (282 lb).   Weight management plan: Discussed healthy diet and exercise guidelines    Counseling  Appropriate preventive services were " discussed with this patient, including applicable screening as appropriate for fall prevention, nutrition, physical activity, Tobacco-use cessation, weight loss and cognition.  Checklist reviewing preventive services available has been given to the patient.  Reviewed patient's diet, addressing concerns and/or questions.   He is at risk for lack of exercise and has been provided with information to increase physical activity for the benefit of his well-being.       Lizy Alvarenga is a 58 year old, presenting for the following:  Physical         HPI  CONCERNS:      5/9/2024   General Health   How would you rate your overall physical health? Good   Feel stress (tense, anxious, or unable to sleep) Not at all         5/9/2024   Nutrition   Three or more servings of calcium each day? (!) NO   Diet: Regular (no restrictions)   How many servings of fruit and vegetables per day? (!) 0-1   How many sweetened beverages each day? 0-1         5/9/2024   Exercise   Days per week of moderate/strenous exercise 2 days   (!) EXERCISE CONCERN      5/9/2024   Social Factors   Frequency of gathering with friends or relatives Once a week   Worry food won't last until get money to buy more No   Food not last or not have enough money for food? No   Do you have housing?  Patient declined   Are you worried about losing your housing? Patient declined   Lack of transportation? No   Unable to get utilities (heat,electricity)? No         5/9/2024   Fall Risk   Fallen 2 or more times in the past year? No   Trouble with walking or balance? No          5/9/2024   Dental   Dentist two times every year? Yes         5/9/2024   TB Screening   Were you born outside of the [place]? No         Today's PHQ-2 Score:       5/9/2024     9:29 AM   PHQ-2 ( 1999 [employer])   Q1: Little interest or pleasure in doing things 0   Q2: Feeling down, depressed or hopeless 0   PHQ-2 Score 0   Q1: Little interest or pleasure in doing things Not at all   Q2: Feeling down,  depressed or hopeless Not at all   PHQ-2 Score 0           2024   Substance Use   Alcohol more than 3/day or more than 7/wk Not Applicable   Do you use any other substances recreationally? No     Social History     Tobacco Use    Smoking status: Former     Current packs/day: 0.00     Average packs/day: 1 pack/day for 25.0 years (25.0 ttl pk-yrs)     Types: Cigarettes     Start date: 3/28/1985     Quit date: 3/28/2010     Years since quittin.1     Passive exposure: Past    Smokeless tobacco: Never   Vaping Use    Vaping status: Never Used   Substance Use Topics    Alcohol use: No     Alcohol/week: 5.0 standard drinks of alcohol     Types: 6 Standard drinks or equivalent per week    Drug use: No           2024   STI Screening   New sexual partner(s) since last STI/HIV test? No   Last PSA:   PSA   Date Value Ref Range Status   2018 0.14 0 - 4 ug/L Final     Comment:     Assay Method:  Chemiluminescence using Siemens Vista analyzer     Prostate Specific Antigen Screen   Date Value Ref Range Status   2023 0.13 0.00 - 3.50 ng/mL Final     ASCVD Risk   The ASCVD Risk score (Negrita LOPEZ, et al., 2019) failed to calculate for the following reasons:    The patient has a prior MI or stroke diagnosis         Reviewed and updated as needed this visit by Provider                    Past Medical History:   Diagnosis Date    Hypertension     DIAGNOSISED MAY 2012    Unspecified cerebral artery occlusion with cerebral infarction      Past Surgical History:   Procedure Laterality Date    COLONOSCOPY N/A 2018    Procedure: COLONOSCOPY;  colonoscopy;  Surgeon: Glynn Guardado MD;  Location: [facility]    ENT SURGERY      NOSE BLLED IN PAST NO SURGURY     OB History   No obstetric history on file.     Lab work is in process  Labs reviewed in EPIC  BP Readings from Last 3 Encounters:   24 (!) 160/78   10/30/23 (!) 163/90   23 (!) 140/80    Wt Readings from Last 3 Encounters:   24 127.9 kg  (282 lb)   10/30/23 127 kg (280 lb)   23 135.6 kg (299 lb)                  Patient Active Problem List   Diagnosis    Advanced care planning/counseling discussion    Cerebral embolism with cerebral infarction (H)    Hyperlipidemia LDL goal <100    Essential hypertension with goal blood pressure less than 140/90    CVA (cerebral vascular accident) (H)    MDADY (obstructive sleep apnea)    Generalized anxiety disorder    Morbid obesity (H)    Type 2 diabetes mellitus without complication, without long-term current use of insulin (H)    Chronic gout of right ankle, unspecified cause    Gastroesophageal reflux disease without esophagitis     Past Surgical History:   Procedure Laterality Date    COLONOSCOPY N/A 2018    Procedure: COLONOSCOPY;  colonoscopy;  Surgeon: Glynn Guardado MD;  Location: [facility]    ENT SURGERY      NOSE BLLED IN PAST NO SURGURY       Social History     Tobacco Use    Smoking status: Former     Current packs/day: 0.00     Average packs/day: 1 pack/day for 25.0 years (25.0 ttl pk-yrs)     Types: Cigarettes     Start date: 3/28/1985     Quit date: 3/28/2010     Years since quittin.1     Passive exposure: Past    Smokeless tobacco: Never   Substance Use Topics    Alcohol use: No     Alcohol/week: 5.0 standard drinks of alcohol     Types: 6 Standard drinks or equivalent per week     Family History   Problem Relation Age of Onset    Heart Disease Mother         MI late 50's age    Diabetes Father     Blood Disease Father         clots    Breast Cancer Maternal Grandmother     Heart Disease Maternal Grandfather     Alcohol/Drug Paternal Grandfather     Blood Disease Brother         clots-antiptrip    Diabetes Brother 52        Type II         Current Outpatient Medications   Medication Sig Dispense Refill    allopurinol (ZYLOPRIM) 300 MG tablet Take 1.5 tablets (450 mg) by mouth daily 135 tablet 3    Ascorbic Acid (VITAMIN C PO) Take 1,000 mg by mouth       ASPIRIN NOT PRESCRIBED,  INTENTIONAL, Antiplatelet medication not prescribed intentionally due to Plavix  0    atorvastatin (LIPITOR) 20 MG tablet Take 1 tablet (20 mg) by mouth daily 90 tablet 3    blood glucose (ACCU-CHEK GUIDE) test strip Use to test blood sugar 3 times daily or as directed. 100 each 11    blood glucose monitoring (ACCU-CHEK FASTCLIX) lancets Use to test blood sugar 3 times daily or as directed.  Ok to substitute alternative if insurance prefers. 102 each 11    Cholecalciferol (VITAMIN D) 2000 UNITS tablet Take 2,000 Units by mouth daily. (Patient taking differently: Take 1,000 Units by mouth daily) 100 tablet 3    clopidogrel (PLAVIX) 75 MG tablet Take 1 tablet (75 mg) by mouth daily 90 tablet 3    cyanocobalamin (B-12) 1000 MCG TBCR Take 1,000 mcg by mouth daily. 100 tablet 1    Hearing Aid Batteries (HEARING AID BATTERY) MISC       IBUPROFEN PO Take 400-600 mg by mouth as needed.        indomethacin (INDOCIN) 50 MG capsule TAKE 1 CAPSULE BY MOUTH THREE TIMES DAILY WITH MEALS 30 capsule 3    LANsoprazole (PREVACID) 30 MG DR capsule Take 1 capsule by mouth once daily 90 capsule 3    lisinopril (ZESTRIL) 2.5 MG tablet Take 1 tablet (2.5 mg) by mouth daily 90 tablet 3    mupirocin (BACTROBAN) 2 % external ointment Apply topically 3 times daily 10 days for skin sores. 22 g 0    order for DME  HEATED HUMIDITY   CHIN STRAP   NON-DSPOSABLE FILTERS   DISPOSABLE FILTERS (2 PER 1 MONTHS)   NASAL PILLOW (2 PER 1 MONTHS)   FULL FACE MASK  (1 PER 3 MONTHS)   FULL FACE CUSHION  (1 PER 1 MONTHS)   HUMIDIFIER CHAMBER (1 PER 6 MONTHS)   MASK (1 PER 3 MONTHS)   NASAL CUSHION (2 PER 1 MONTHS)   HEADGEAR (1 PER 6 MONTHS)   TUBING (1 PER 3 MONTHS)   HEATED TUBING (1 PER 3 MONTHS) 1 Device 1    ORDER FOR DME Pt was set up on a Respironics RemStar 60 Series Auto AFlex 5-15 cm H2O with heated humidity and a modem. Pt chose a Mirage FX nasal mask standard size.       Allergies  "  Allergen Reactions    Nka [No Known Allergies]      Recent Labs   Lab Test 03/27/23  0855 09/23/22  1036 11/04/21  0940 11/04/21  0940 10/23/20  0934 01/17/20  0832 07/17/19  0756 04/17/19  0955   A1C 7.4* 6.7*  --  6.7* 6.3*   < > 6.0* 6.6*   LDL 77  --   --  60 54  --  42  --    HDL 52  --   --  64 62  --  57  --    TRIG 110  --   --  168* 195*  --  185*  --    ALT  --  42  --  45  --   --   --  36   CR 0.80 0.83   < > 0.76 0.84  --   --  0.82   GFRESTIMATED >90 >90   < > >90 >90  --   --  >90   GFRESTBLACK  --   --   --   --  >90  --   --  >90   POTASSIUM 4.8 4.7  --  4.7 4.0  --   --  4.4   TSH  --   --   --   --   --   --  1.92  --     < > = values in this interval not displayed.        Review of Systems  Constitutional, neuro, ENT, endocrine, pulmonary, cardiac, gastrointestinal, genitourinary, musculoskeletal, integument and psychiatric systems are negative, except as otherwise noted.     Objective    Exam  BP (!) 160/78 (Cuff Size: Adult Large)   Pulse 80   Temp 98.4  F (36.9  C) (Tympanic)   Resp 18   Ht 1.803 m (5' 11\")   Wt 127.9 kg (282 lb)   SpO2 97%   BMI 39.33 kg/m     Estimated body mass index is 39.33 kg/m  as calculated from the following:    Height as of this encounter: 1.803 m (5' 11\").    Weight as of this encounter: 127.9 kg (282 lb).    Physical Exam  GENERAL: alert, no distress, and obese  EYES: Eyes grossly normal to inspection, PERRL and conjunctivae and sclerae normal  HENT: normal cephalic/atraumatic, nose and mouth without ulcers or lesions, oropharynx clear, and oral mucous membranes moist  NECK: no adenopathy, no asymmetry, masses, or scars  RESP: lungs clear to auscultation - no rales, rhonchi or wheezes  CV: regular rate and rhythm, normal S1 S2, no S3 or S4, no murmur, click or rub, no peripheral edema  ABDOMEN: soft, nontender, no hepatosplenomegaly, no masses   MS: no gross musculoskeletal defects noted, no edema  SKIN: no suspicious lesions or rashes  NEURO: Normal " strength and tone, mentation intact and speech normal  PSYCH: mentation appears normal, affect normal/bright      Signed Electronically by: Gurinder Cullen MD

## 2024-05-09 NOTE — PATIENT INSTRUCTIONS
"Preventive Care Advice   This is general advice we often give to help people stay healthy. Your care team may have specific advice just for you. Please talk to your care team about your own preventive care needs.  Lifestyle  Exercise at least 150 minutes each week (30 minutes a day, 5 days a week).  Do muscle strengthening activities 2 days a week. These help control your weight and prevent disease.  No smoking.  Wear sunscreen to prevent skin cancer.  Have your home tested for radon every 2 to 5 years. Radon is a colorless, odorless gas that can harm your lungs. To learn more, go to www.health.[facility].mn. and search for \"Radon in Homes.\"  Keep guns unloaded and locked up in a safe place like a safe or gun vault, or, use a gun lock and hide the keys. Always lock away bullets separately. To learn more, visit [employer].[place].gov and search for \"safe gun storage.\"  Nutrition  Eat 5 or more servings of fruits and vegetables each day.  Try wheat bread, brown rice and whole grain pasta (instead of white bread, rice, and pasta).  Get enough calcium and vitamin D. Check the label on foods and aim for 100% of the RDA (recommended daily allowance).  Regular exams  Have a dental exam and cleaning every 6 months.  See your health care team every year to talk about:  Any changes in your health.  Any medicines your care team has prescribed.  Preventive care, family planning, and ways to prevent chronic diseases.  Shots (vaccines)   HPV shots (up to age 26), if you've never had them before.  Hepatitis B shots (up to age 59), if you've never had them before.  COVID-19 shot: Get this shot when it's due.  Flu shot: Get a flu shot every year.  Tetanus shot: Get a tetanus shot every 10 years.  Pneumococcal, hepatitis A, and RSV shots: Ask your care team if you need these based on your risk.  Shingles shot (for age 50 and up).  General health tests  Diabetes screening:  Starting at age 35, Get screened for diabetes at least every 3 years.  If " you are younger than age 35, ask your care team if you should be screened for diabetes.  Cholesterol test: At age 39, start having a cholesterol test every 5 years, or more often if advised.  Bone density scan (DEXA): At age 50, ask your care team if you should have this scan for osteoporosis (brittle bones).  Hepatitis C: Get tested at least once in your life.  Abdominal aortic aneurysm screening: Talk to your doctor about having this screening if you:  Have ever smoked; and  Are biologically male; and  Are between the ages of 65 and 75.  STIs (sexually transmitted infections)  Before age 24: Ask your care team if you should be screened for STIs.  After age 24: Get screened for STIs if you're at risk. You are at risk for STIs (including HIV) if:  You are sexually active with more than one person.  You don't use condoms every time.  You or a partner was diagnosed with a sexually transmitted infection.  If you are at risk for HIV, ask about PrEP medicine to prevent HIV.  Get tested for HIV at least once in your life, whether you are at risk for HIV or not.  Cancer screening tests  Cervical cancer screening: If you have a cervix, begin getting regular cervical cancer screening tests at age 21. Most people who have regular screenings with normal results can stop after age 65. Talk about this with your provider.  Breast cancer scan (mammogram): If you've ever had breasts, begin having regular mammograms starting at age 40. This is a scan to check for breast cancer.  Colon cancer screening: It is important to start screening for colon cancer at age 45.  Have a colonoscopy test every 10 years (or more often if you're at risk) Or, ask your provider about stool tests like a FIT test every year or Cologuard test every 3 years.  To learn more about your testing options, visit: www.[employer]/343384.pdf.  For help making a decision, visit: jerry/vb25925.  Prostate cancer screening test: If you have a prostate and are age 55  to 69, ask your provider if you would benefit from a yearly prostate cancer screening test.  Lung cancer screening: If you are a current or former smoker age 50 to 80, ask your care team if ongoing lung cancer screenings are right for you.  For informational purposes only. Not to replace the advice of your health care provider. Copyright   2023 [employer]. All rights reserved. Clinically reviewed by the [facility]. LiquidText 742177 - REV 04/24.

## 2024-05-09 NOTE — NURSING NOTE
"Chief Complaint   Patient presents with    Physical     BP (!) 160/78 (Cuff Size: Adult Large)   Pulse 80   Temp 98.4  F (36.9  C) (Tympanic)   Resp 18   Ht 1.803 m (5' 11\")   Wt 127.9 kg (282 lb)   SpO2 97%   BMI 39.33 kg/m   Estimated body mass index is 39.33 kg/m  as calculated from the following:    Height as of this encounter: 1.803 m (5' 11\").    Weight as of this encounter: 127.9 kg (282 lb).  Patient presents to the clinic using No DME      Health Maintenance that is potentially due pending provider review:    Health Maintenance Due   Topic Date Due    ADVANCE CARE PLANNING  03/28/2017    YEARLY PREVENTIVE VISIT  03/16/2018    DIABETIC FOOT EXAM  11/04/2022    COVID-19 Vaccine (3 - 2023-24 season) 09/01/2023    ANNUAL REVIEW OF HM ORDERS  09/23/2023    A1C  09/27/2023    BMP  03/27/2024    LIPID  03/27/2024    MICROALBUMIN  03/27/2024                  " Diabetes    Hypercholesteremia    Hypothyroid

## 2024-05-14 DIAGNOSIS — E11.9 TYPE 2 DIABETES MELLITUS WITHOUT COMPLICATION, WITHOUT LONG-TERM CURRENT USE OF INSULIN (H): Primary | ICD-10-CM

## 2024-05-14 RX ORDER — METFORMIN HCL 500 MG
500 TABLET, EXTENDED RELEASE 24 HR ORAL 2 TIMES DAILY WITH MEALS
Qty: 180 TABLET | Refills: 1 | Status: SHIPPED | OUTPATIENT
Start: 2024-05-14 | End: 2024-05-20

## 2024-05-14 NOTE — TELEPHONE ENCOUNTER
Pls see telephone encounter below.     Lab Results   Component Value Date    A1C 11.7 05/09/2024    A1C 7.4 03/27/2023    A1C 6.7 09/23/2022    A1C 6.7 11/04/2021    A1C 6.3 10/23/2020    A1C 6.2 01/17/2020    A1C 6.0 07/17/2019    A1C 6.6 04/17/2019    A1C 6.1 03/26/2018    Rx and referral pended, message routed to Dr. Cullen for consideration.     Julie Behrendt RN

## 2024-05-14 NOTE — TELEPHONE ENCOUNTER
Reason for Call:  Other prescription    Detailed comments: Had an appointment with PCP last week,. Now wants to go on Metformin, He wants more information about the drug & process.  Would also like to speak to a diabetic nurse , and have advise there as well.Please call.    Phone Number Patient can be reached at: Cell number on file:    Telephone Information:   Mobile [phone]       Best Time: any time before noon.    Can we leave a detailed message on this number? YES    Call taken on 5/14/2024 at 9:43 AM by Angelique Livingston

## 2024-05-15 ENCOUNTER — NURSE TRIAGE (OUTPATIENT)
Dept: NURSING | Facility: CLINIC | Age: 59
End: 2024-05-15
Payer: COMMERCIAL

## 2024-05-15 NOTE — TELEPHONE ENCOUNTER
"      Nurse Triage SBAR    Is this a 2nd Level Triage? YES, LICENSED PRACTITIONER REVIEW IS REQUIRED    Situation: Increased blood sugar reading this morning    Background: Patient was seen by PCP on 5/9/24. States he had not been checking his glucose for some time and was told it was high at that time (434).   HGB A1C  11.7.   He states he had to get batteries for his glucose monitor. Checked yesterday morning and it was \"high 300's\" . This morning it was 432. He has just picked up his metformin and will start it today. States he does feel more thirsty lately and feels dizzy and a little fuzzy mentally. No vision changes or changes in breathing.     Assessment: Hyperglycemia    Protocol Recommended Disposition:   Discuss With PCP And Callback By Nurse Within 1 Hour    Recommendation: recommended patient eat something small and take the metformin. Also to drink one glass of water each hour for the next few hours. Please call his cell number with any additional recommendations until he is seen in Endocrine.      Routed to provider/team  [facility] POOL AND NURSE POOL      Rosalia Cervantes RN  P Red Flag Triage/MRT       Reason for Disposition   Blood glucose > 400 mg/dL (22.2 mmol/L)    Additional Information   Negative: Unconscious or difficult to awaken   Negative: Acting confused (e.g., disoriented, slurred speech)   Negative: Very weak (can't stand)   Negative: Sounds like a life-threatening emergency to the triager   Negative: Vomiting and signs of dehydration (e.g., very dry mouth, lightheaded, dark urine)   Negative: Blood glucose > 240 mg/dL (13.3 mmol/L) and rapid breathing   Negative: Blood glucose > 500 mg/dL (27.8 mmol/L)   Negative: Blood glucose > 240 mg/dL (13.3 mmol/L) AND urine ketones moderate-large (or more than 1+)   Negative: Blood glucose > 240 mg/dL (13.3 mmol/L) and blood ketones > 1.4 mmol/L   Negative: Blood glucose > 240 mg/dL (13.3 mmol/L) AND vomiting AND unable to " "check for ketones (in blood or urine)   Negative: Vomiting lasting > 4 hours   Negative: Patient sounds very sick or weak to the triager   Negative: Fever > 100.4 F (38.0 C)   Negative: Caller has URGENT medication or insulin pump question and triager unable to answer question    Answer Assessment - Initial Assessment Questions  1. BLOOD GLUCOSE: \"What is your blood glucose level?\"       432  2. ONSET: \"When did you check the blood glucose?\"      8:40 am   3. USUAL RANGE: \"What is your glucose level usually?\" (e.g., usual fasting morning value, usual evening value)      Does not check on regular basis  4. KETONES: \"Do you check for ketones (urine or blood test strips)?\" If Yes, ask: \"What does the test show now?\"       no  5. TYPE 1 or 2:  \"Do you know what type of diabetes you have?\"  (e.g., Type 1, Type 2, Gestational; doesn't know)       Type 2   6. INSULIN: \"Do you take insulin?\" \"What type of insulin(s) do you use? What is the mode of delivery? (syringe, pen; injection or pump)?\"       no  7. DIABETES PILLS: \"Do you take any pills for your diabetes?\" If Yes, ask: \"Have you missed taking any pills recently?\"      Metformin, will start today  8. OTHER SYMPTOMS: \"Do you have any symptoms?\" (e.g., fever, frequent urination, difficulty breathing, dizziness, weakness, vomiting)      Dizzy, little fuzzy. No excessive urination. Increased thirst, vision stable  9. PREGNANCY: \"Is there any chance you are pregnant?\" \"When was your last menstrual period?\"      na    Protocols used: Diabetes - High Blood Sugar-A-OH    "

## 2024-05-20 ENCOUNTER — OFFICE VISIT (OUTPATIENT)
Dept: FAMILY MEDICINE | Facility: CLINIC | Age: 59
End: 2024-05-20
Payer: COMMERCIAL

## 2024-05-20 VITALS
WEIGHT: 280 LBS | RESPIRATION RATE: 14 BRPM | BODY MASS INDEX: 39.2 KG/M2 | OXYGEN SATURATION: 99 % | SYSTOLIC BLOOD PRESSURE: 138 MMHG | HEART RATE: 82 BPM | DIASTOLIC BLOOD PRESSURE: 68 MMHG | HEIGHT: 71 IN

## 2024-05-20 DIAGNOSIS — I10 ESSENTIAL HYPERTENSION WITH GOAL BLOOD PRESSURE LESS THAN 140/90: ICD-10-CM

## 2024-05-20 DIAGNOSIS — E78.5 HYPERLIPIDEMIA LDL GOAL <100: ICD-10-CM

## 2024-05-20 DIAGNOSIS — E11.9 TYPE 2 DIABETES MELLITUS WITHOUT COMPLICATION, WITHOUT LONG-TERM CURRENT USE OF INSULIN (H): Primary | ICD-10-CM

## 2024-05-20 PROCEDURE — 99214 OFFICE O/P EST MOD 30 MIN: CPT | Performed by: FAMILY MEDICINE

## 2024-05-20 PROCEDURE — G2211 COMPLEX E/M VISIT ADD ON: HCPCS | Performed by: FAMILY MEDICINE

## 2024-05-20 RX ORDER — METFORMIN HCL 500 MG
1000 TABLET, EXTENDED RELEASE 24 HR ORAL 2 TIMES DAILY WITH MEALS
Qty: 180 TABLET | Refills: 1 | Status: SHIPPED | OUTPATIENT
Start: 2024-05-20 | End: 2024-09-30

## 2024-05-20 RX ORDER — FLASH GLUCOSE SENSOR
KIT MISCELLANEOUS
Qty: 2 EACH | Refills: 5 | Status: SHIPPED | OUTPATIENT
Start: 2024-05-20

## 2024-05-20 ASSESSMENT — PAIN SCALES - GENERAL: PAINLEVEL: NO PAIN (0)

## 2024-05-20 NOTE — NURSING NOTE
"Chief Complaint   Patient presents with    Diabetes     /68   Pulse 82   Resp 14   Ht 1.791 m (5' 10.5\")   Wt 127 kg (280 lb)   SpO2 99%   BMI 39.61 kg/m   Estimated body mass index is 39.61 kg/m  as calculated from the following:    Height as of this encounter: 1.791 m (5' 10.5\").    Weight as of this encounter: 127 kg (280 lb).  Patient presents to the clinic using No DME      Health Maintenance that is potentially due pending provider review:    Health Maintenance Due   Topic Date Due    ADVANCE CARE PLANNING  03/28/2017    DIABETIC FOOT EXAM  11/04/2022    COVID-19 Vaccine (3 - 2023-24 season) 09/01/2023                  "

## 2024-05-20 NOTE — PROGRESS NOTES
Assessment & Plan     Type 2 diabetes mellitus without complication, without long-term current use of insulin (H)  Last hemoglobin A1c 11.7, consistent with uncontrolled diabetes.  Patient was started on metformin.  Home blood glucose readings high as well.  Management option discussed.  Metformin XR increased to 1 g twice daily.  Recommended regular exercise, healthy diet and weight loss.  Patient has an appointment with diabetic educator.  Will consider GLP-1 if blood glucose remains above target goal.  - metFORMIN (GLUCOPHAGE XR) 500 MG 24 hr tablet; Take 2 tablets (1,000 mg) by mouth 2 times daily (with meals)  - Hemoglobin A1c; Future  - Continuous Glucose Sensor (FREESTYLE CLEO 14 DAY SENSOR) MISC; Change every 14 days.    Essential hypertension with goal blood pressure less than 140/90  Blood pressure within target goal of less than 140/90.  Recommended to continue lisinopril    Hyperlipidemia LDL goal <100  Continue Lipitor      Lizy   Lyle is a 58 year old, presenting for the following health issues:  Diabetes        5/20/2024     9:41 AM   Additional Questions   Roomed by Laura PATEL   Accompanied by Self         5/20/2024     9:41 AM   Patient Reported Additional Medications   Patient reports taking the following new medications .     History of Present Illness       Diabetes:   He presents for follow up of diabetes.  He is checking home blood glucose two times daily.   He checks blood glucose before meals.  Blood glucose is sometimes over 200 and never under 70.  When his blood glucose is low, the patient is asymptomatic for confusion, blurred vision, lethargy and reports not feeling dizzy, shaky, or weak.  He is concerned about blood sugar frequently over 200.   He is having excessive thirst.            Hypertension: He presents for follow up of hypertension.  He does check blood pressure  regularly outside of the clinic. Outside blood pressures have been over 140/90. He follows a low salt diet.  "    He eats 0-1 servings of fruits and vegetables daily.He consumes 0 sweetened beverage(s) daily.He exercises with enough effort to increase his heart rate 9 or less minutes per day.  He exercises with enough effort to increase his heart rate 3 or less days per week.   He is taking medications regularly.       Hyperlipidemia Follow-Up  Are you regularly taking any medication or supplement to lower your cholesterol?   Yes- Lipitor  Are you having muscle aches or other side effects that you think could be caused by your cholesterol lowering medication?  No      Review of Systems  Constitutional, neuro, ENT, endocrine, pulmonary, cardiac, gastrointestinal, genitourinary, musculoskeletal, integument and psychiatric systems are negative, except as otherwise noted.      Objective    /68   Pulse 82   Resp 14   Ht 1.791 m (5' 10.5\")   Wt 127 kg (280 lb)   SpO2 99%   BMI 39.61 kg/m    Body mass index is 39.61 kg/m .  Physical Exam   GENERAL: alert and no distress  EYES: Eyes grossly normal to inspection, PERRL and conjunctivae and sclerae normal  NECK: no adenopathy, no asymmetry, masses, or scars  RESP: lungs clear to auscultation - no rales, rhonchi or wheezes  CV: regular rate and rhythm, normal S1 S2, no S3 or S4, no murmur, click or rub, no peripheral edema  MS: no gross musculoskeletal defects noted, no edema  SKIN: no suspicious lesions or rashes  NEURO: Normal strength and tone, mentation intact and speech normal  PSYCH: mentation appears normal, affect normal/bright    Wt Readings from Last 10 Encounters:   05/20/24 127 kg (280 lb)   05/09/24 127.9 kg (282 lb)   10/30/23 127 kg (280 lb)   05/04/23 135.6 kg (299 lb)   03/27/23 133.8 kg (295 lb)   09/23/22 131.5 kg (290 lb)   11/04/21 132.5 kg (292 lb)   10/23/20 132 kg (291 lb)   01/17/20 128.5 kg (283 lb 6.4 oz)   01/07/20 131.6 kg (290 lb 3.2 oz)            Signed Electronically by: Gurinder Cullen MD    "

## 2024-06-04 ENCOUNTER — VIRTUAL VISIT (OUTPATIENT)
Dept: EDUCATION SERVICES | Facility: CLINIC | Age: 59
End: 2024-06-04
Payer: COMMERCIAL

## 2024-06-04 DIAGNOSIS — E11.9 TYPE 2 DIABETES MELLITUS WITHOUT COMPLICATION, WITHOUT LONG-TERM CURRENT USE OF INSULIN (H): ICD-10-CM

## 2024-06-04 PROCEDURE — G0108 DIAB MANAGE TRN  PER INDIV: HCPCS | Mod: 93 | Performed by: DIETITIAN, REGISTERED

## 2024-06-04 NOTE — PROGRESS NOTES
"Diabetes Self-Management Education & Support    Presents for: Individual review    Type of Service: Telephone Visit    Originating Location (Patient Location): Home  Distant Location (Provider Location): Offsite  Mode of Communication:  Telephone    Telephone Visit Start Time:  11:00  Telephone Visit End Time (telephone visit stop time): 11:40    How would patient like to obtain AVS? Abner      ASSESSMENT:  Pt appreciative of DM Ed review. States \"wasn't sure what to expect with starting this Metformin\". Reports tolerating well, appears to have a reduced appetite which is helping with wt loss. Notes recent symptoms of hyperglycemia with A1c increase, \"having a lot of stress at work\" (works at a [employer]) and admits to poor eating habits. Reports many food intolerances, love vegetables but cannot tolerate many of them. Discussed diet recommendations, BG monitoring, etc. CGM previously ordered, but pt wants to use up current supply of fingerstick test strips first. Discussed stress mgmt, answered all questions and offered additonal follow up and support. Pt agreeable.     Patient's most recent   Lab Results   Component Value Date    A1C 11.7 05/09/2024    A1C 6.3 10/23/2020     is not meeting goal of <7.0    Diabetes knowledge and skills assessment:   Patient is knowledgeable in diabetes management concepts related to: needs review    Continue education with the following diabetes management concepts: Healthy Eating, Being Active, Monitoring, Taking Medication, Problem Solving, and Healthy Coping    Based on learning assessment above, most appropriate setting for further diabetes education would be: Individual setting.      PLAN    Topics to cover at upcoming visits: Healthy Eating, Monitoring, Taking Medication, Problem Solving, and Healthy Coping    Follow-up: 6 weeks    See Care Plan for co-developed, patient-state behavior change goals.  AVS provided for patient today.    Education Materials Provided:  Referred to " "American Diabetes Association website      SUBJECTIVE/OBJECTIVE:  Presents for: Individual review  Accompanied by: Self  Diabetes education in the past 24mo: Yes  Focus of Visit: Taking Medication, Healthy Eating, Problem Solving  Diabetes type: Type 2  Disease course: Improving  How confident are you filling out medical forms by yourself:: Quite a bit  Transportation concerns: No  Other concerns:: Glasses  Cultural Influences/Ethnic Background:  Not  or     Diabetes Symptoms & Complications:  Diabetes Related Symptoms: Fatigue, Polydipsia (increased thirst), Polyuria (increased urination)  Weight trend: Decreasing  Symptom course: Improving  Disease course: Improving  Complications assessed today?: No    Patient Problem List and Family Medical History reviewed for relevant medical history, current medical status, and diabetes risk factors.    Vitals:  There were no vitals taken for this visit.  Estimated body mass index is 39.61 kg/m  as calculated from the following:    Height as of 5/20/24: 1.791 m (5' 10.5\").    Weight as of 5/20/24: 127 kg (280 lb).   Last 3 BP:   BP Readings from Last 3 Encounters:   05/20/24 138/68   05/09/24 (!) 160/78   10/30/23 (!) 163/90       History   Smoking Status    Former    Packs/day: 1.00    Years: 25.00    Types: Cigarettes    Quit date: 3/28/2010   Smokeless Tobacco    Never       Labs:  Lab Results   Component Value Date    A1C 11.7 05/09/2024    A1C 6.3 10/23/2020     Lab Results   Component Value Date     05/09/2024     11/04/2021     10/23/2020     Lab Results   Component Value Date    LDL 47 05/09/2024    LDL 54 10/23/2020     HDL Cholesterol   Date Value Ref Range Status   10/23/2020 62 >39 mg/dL Final     Direct Measure HDL   Date Value Ref Range Status   05/09/2024 32 (L) >=40 mg/dL Final   ]  GFR Estimate   Date Value Ref Range Status   05/09/2024 >90 >60 mL/min/1.73m2 Final   10/23/2020 >90 >60 mL/min/[1.73_m2] Final     Comment:     " "Non  GFR Calc  Starting 12/18/2018, serum creatinine based estimated GFR (eGFR) will be   calculated using the Chronic Kidney Disease Epidemiology Collaboration   (CKD-EPI) equation.       GFR Estimate If Black   Date Value Ref Range Status   10/23/2020 >90 >60 mL/min/[1.73_m2] Final     Comment:      GFR Calc  Starting 12/18/2018, serum creatinine based estimated GFR (eGFR) will be   calculated using the Chronic Kidney Disease Epidemiology Collaboration   (CKD-EPI) equation.       Lab Results   Component Value Date    CR 0.82 05/09/2024    CR 0.84 10/23/2020     No results found for: \"MICROALBUMIN\"    Healthy Eating:  Healthy Eating Assessed Today: Yes  Cultural/Sabianist diet restrictions?: No  Do you have any food allergies or intolerances?: Yes  Please list your food allergies / intolerances: greens and many other vegetables  Meal planning/habits: None  Who cooks/prepares meals for you?: Self  Who purchases food in  your home?: Self  How many times a week on average do you eat food made away from home (restaurant/take-out)?: 1  Meals include: Dinner, Breakfast  Breakfast: 10:00 am: toast with salami  Dinner: hotdog on bun with cheese, skinny popcorn or breaded shrimp, french fries  Snacks: chips and salsa  Beverages: Water, Coffee, Diet soda  Has patient met with a dietitian in the past?: Yes    Being Active:  Being Active Assessed Today: Yes  Exercise:: Currently not exercising  Barrier to exercise: Time    Monitoring:  Monitoring Assessed Today: Yes  Blood Glucose Meter: Accu-chek  Times checking blood sugar at home (number): 2  Times checking blood sugar at home (per): Day  Blood glucose trend: Decreasing    Taking Medications:  Diabetes Medication(s)       Biguanides       metFORMIN (GLUCOPHAGE XR) 500 MG 24 hr tablet Take 2 tablets (1,000 mg) by mouth 2 times daily (with meals)          Taking Medication Assessed Today: Yes  Current Treatments: Oral Medication (taken by " mouth)  Problems taking diabetes medications regularly?: No  Diabetes medication side effects?: No    Problem Solving:  Problem Solving Assessed Today: No  Is the patient at risk for hypoglycemia?: No    Reducing Risks:  Reducing Risks Assessed Today: No  Has dilated eye exam at least once a year?: Yes  Sees dentist every 6 months?: Yes  Feet checked by healthcare provider in the last year?: Yes    Healthy Coping:  Healthy Coping Assessed Today: Yes  Emotional response to diabetes: Concern for health and well-being, Ready to learn  Informal Support system:: Family  Stage of change: PREPARATION (Decided to change - considering how)  Support resources: Websites  Patient Activation Measure Survey Score:      4/19/2012    10:00 AM   PRINCE Score (Last Two)   PRINCE Raw Score 39   Activation Score 56.4   PRINCE Level 3       Care Plan and Education Provided:  Care Plan: Diabetes   Updates made by Dayanna Hernandez RD since 6/4/2024 12:00 AM        Problem: HbA1C Not In Goal         Goal: Establish Regular Follow-Ups with PCP         Task: Discuss with PCP the recommended timing for patient's next follow up visit(s)    Responsible User: Dayanna Hernandez RD        Task: Discuss schedule for PCP visits with patient    Responsible User: Dayanna Hernandez RD        Goal: Get HbA1C Level in Goal         Task: Educate patient on diabetes education self-management topics    Responsible User: Dayanna Hernandez RD        Task: Educate patient on benefits of regular glucose monitoring    Responsible User: Dayanna Hernandez RD        Task: Refer patient to appropriate extended care team member, as needed (Medication Therapy Management, Behavioral Health, Physical Therapy, etc.)    Responsible User: Dayanna Hernandez RD        Task: Discuss diabetes treatment plan with patient    Responsible User: Dayanna Hernandez RD        Problem: Diabetes Self-Management Education Needed to Optimize Self-Care Behaviors         Goal: Understand diabetes  pathophysiology and disease progression         Task: Provide education on diabetes pathophysiology and disease progression specfic to patient's diabetes type    Responsible User: Dayanna Hernandez RD        Goal: Healthy Eating - follow a healthy eating pattern for diabetes         Task: Provide education on portion control and consistency in amount, composition and timing of food intake Completed 6/4/2024   Responsible User: Dayanna Hernandez RD        Task: Provide education on managing carbohydrate intake (carbohydrate counting, plate planning method, etc.) Completed 6/4/2024   Responsible User: Dayanna Hernandez RD        Task: Provide education on weight management    Responsible User: Dayanna Hernandez RD        Task: Provide education on heart healthy eating    Responsible User: Dayanna Hernandez RD        Task: Provide education on eating out    Responsible User: Dayanna Hernandez RD        Task: Develop individualized healthy eating plan with patient    Responsible User: Dayanna Hernandez RD        Goal: Being Active - get regular physical activity, working up to at least 150 minutes per week         Task: Provide education on relationship of activity to glucose and precautions to take if at risk for low glucose Completed 6/4/2024   Responsible User: Dayanna Hernandez RD        Task: Discuss barriers to physical activity with patient    Responsible User: Dayanna Hernandez RD        Task: Develop physical activity plan with patient    Responsible User: Dayanna Hernandez RD        Task: Explore community resources including walking groups, assistance programs, and home videos    Responsible User: Dayanna Hernandez RD        Goal: Monitoring - monitor glucose and ketones as directed         Task: Provide education on blood glucose monitoring (purpose, proper technique, frequency, glucose targets, interpreting results, when to use glucose control solution, sharps disposal) Completed 6/4/2024   Responsible User: David  Dayanna CARMEN RD        Task: Provide education on continuous glucose monitoring (sensor placement, use of andrzej or /reader, understanding glucose trends, alerts and alarms, differences between sensor glucose and blood glucose)    Responsible User: Dayanna Hernandez RD        Task: Provide education on ketone monitoring (when to monitor, frequency, etc.)    Responsible User: Dayanna Hernandez RD        Goal: Taking Medication - patient is consistently taking medications as directed         Task: Provide education on action of prescribed medication, including when to take and possible side effects Completed 6/4/2024   Responsible User: Dayanna Hernandez RD        Task: Provide education on insulin and injectable diabetes medications, including administration, storage, site selection and rotation for injection sites    Responsible User: Dayanna Hernandez RD        Task: Discuss barriers to medication adherence with patient and provide management technique ideas as appropriate    Responsible User: Dayanna Hernandez RD        Task: Provide education on frequency and refill details of medications    Responsible User: Dayanna Hernandez RD        Goal: Problem Solving - know how to prevent and manage short-term diabetes complications         Task: Provide education on high blood glucose - causes, signs/symptoms, prevention and treatment Completed 6/4/2024   Responsible User: Dayanna Hernandez RD        Task: Provide education on low blood glucose - causes, signs/symptoms, prevention, treatment, carrying a carbohydrate source at all times, and medical identification    Responsible User: Dayanna Hernandez RD        Task: Provide education on safe travel with diabetes    Responsible User: Dayanna Hernandez RD        Task: Provide education on how to care for diabetes on sick days    Responsible User: Dayanna Hernandez RD        Task: Provide education on when to call a health care provider    Responsible User: Dayanna Hernandez RD         Goal: Reducing Risks - know how to prevent and treat long-term diabetes complications         Task: Provide education on major complications of diabetes, prevention, early diagnostic measures and treatment of complications    Responsible User: Dayanna Hernandez RD        Task: Provide education on recommended care for dental, eye and foot health    Responsible User: Dayanna Hernandez RD        Task: Provide education on Hemoglobin A1c - goals and relationship to blood glucose levels    Responsible User: Dayanna Hernandez RD        Task: Provide education on recommendations for heart health - lipid levels and goals, blood pressure and goals, and aspirin therapy, if indicated    Responsible User: Dayanna Hernandez RD        Task: Provide education on tobacco cessation    Responsible User: Dayanna Hernandez RD        Goal: Healthy Coping - use available resources to cope with the challenges of managing diabetes         Task: Discuss recognizing feelings about having diabetes    Responsible User: Dayanna Hernandez RD        Task: Provide education on the benefits of making appropriate lifestyle changes    Responsible User: Dayanna Hernandez RD        Task: Provide education on benefits of utilizing support systems    Responsible User: Dayanna Hernandez RD        Task: Discuss methods for coping with stress Completed 6/4/2024   Responsible User: Dayanna Hernandez RD        Task: Provide education on when to seek professional counseling    Responsible User: Dayanna Hernandez RD Mary Spencer RD, [facility]  Diabetes     Time Spent: 40 minutes  Encounter Type: Individual    Any diabetes medication dose changes were made via the CDE Protocol per the patient's primary care provider. A copy of this encounter was shared with the provider.

## 2024-06-04 NOTE — LETTER
"    6/4/2024         RE: Lyle Tenorio  [address]  [place] [address]        Dear Colleague,    Thank you for referring your patient, Lyle Tenorio, to the [facility]. Please see a copy of my visit note below.    Diabetes Self-Management Education & Support    Presents for: Individual review    Type of Service: Telephone Visit    Originating Location (Patient Location): Home  Distant Location (Provider Location): Offsite  Mode of Communication:  Telephone    Telephone Visit Start Time:  11:00  Telephone Visit End Time (telephone visit stop time): 11:40    How would patient like to obtain AVS? MyChart      ASSESSMENT:  Pt appreciative of DM Ed review. States \"wasn't sure what to expect with starting this Metformin\". Reports tolerating well, appears to have a reduced appetite which is helping with wt loss. 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 Reports many food intolerances, love vegetables but cannot tolerate many of them. Discussed diet recommendations, BG monitoring, etc. CGM previously ordered, but pt wants to use up current supply of fingerstick test strips first. Discussed stress mgmt, answered all questions and offered additonal follow up and support. Pt agreeable.     Patient's most recent   Lab Results   Component Value Date    A1C 11.7 05/09/2024    A1C 6.3 10/23/2020     is not meeting goal of <7.0    Diabetes knowledge and skills assessment:   Patient is knowledgeable in diabetes management concepts related to: needs review    Continue education with the following diabetes management concepts: Healthy Eating, Being Active, Monitoring, Taking Medication, Problem Solving, and Healthy Coping    Based on learning assessment above, most appropriate setting for further diabetes education would be: Individual setting.      PLAN    Topics to cover at upcoming visits: " "Healthy Eating, Monitoring, Taking Medication, Problem Solving, and Healthy Coping    Follow-up: 6 weeks    See Care Plan for co-developed, patient-state behavior change goals.  AVS provided for patient today.    Education Materials Provided:  Referred to American Diabetes Association website      SUBJECTIVE/OBJECTIVE:  Presents for: Individual review  Accompanied by: Self  Diabetes education in the past 24mo: Yes  Focus of Visit: Taking Medication, Healthy Eating, Problem Solving  Diabetes type: Type 2  Disease course: Improving  How confident are you filling out medical forms by yourself:: Quite a bit  Transportation concerns: No  Other concerns:: Glasses  Cultural Influences/Ethnic Background:  Not  or     Diabetes Symptoms & Complications:  Diabetes Related Symptoms: Fatigue, Polydipsia (increased thirst), Polyuria (increased urination)  Weight trend: Decreasing  Symptom course: Improving  Disease course: Improving  Complications assessed today?: No    Patient Problem List and Family Medical History reviewed for relevant medical history, current medical status, and diabetes risk factors.    Vitals:  There were no vitals taken for this visit.  Estimated body mass index is 39.61 kg/m  as calculated from the following:    Height as of 5/20/24: 1.791 m (5' 10.5\").    Weight as of 5/20/24: 127 kg (280 lb).   Last 3 BP:   BP Readings from Last 3 Encounters:   05/20/24 138/68   05/09/24 (!) 160/78   10/30/23 (!) 163/90       History   Smoking Status     Former     Packs/day: 1.00     Years: 25.00     Types: Cigarettes     Quit date: 3/28/2010   Smokeless Tobacco     Never       Labs:  Lab Results   Component Value Date    A1C 11.7 05/09/2024    A1C 6.3 10/23/2020     Lab Results   Component Value Date     05/09/2024     11/04/2021     10/23/2020     Lab Results   Component Value Date    LDL 47 05/09/2024    LDL 54 10/23/2020     HDL Cholesterol   Date Value Ref Range Status " "  10/23/2020 62 >39 mg/dL Final     Direct Measure HDL   Date Value Ref Range Status   05/09/2024 32 (L) >=40 mg/dL Final   ]  GFR Estimate   Date Value Ref Range Status   05/09/2024 >90 >60 mL/min/1.73m2 Final   10/23/2020 >90 >60 mL/min/[1.73_m2] Final     Comment:     Non  GFR Calc  Starting 12/18/2018, serum creatinine based estimated GFR (eGFR) will be   calculated using the Chronic Kidney Disease Epidemiology Collaboration   (CKD-EPI) equation.       GFR Estimate If Black   Date Value Ref Range Status   10/23/2020 >90 >60 mL/min/[1.73_m2] Final     Comment:      GFR Calc  Starting 12/18/2018, serum creatinine based estimated GFR (eGFR) will be   calculated using the Chronic Kidney Disease Epidemiology Collaboration   (CKD-EPI) equation.       Lab Results   Component Value Date    CR 0.82 05/09/2024    CR 0.84 10/23/2020     No results found for: \"MICROALBUMIN\"    Healthy Eating:  Healthy Eating Assessed Today: Yes  Cultural/Confucianism diet restrictions?: No  Do you have any food allergies or intolerances?: Yes  Please list your food allergies / intolerances: greens and many other vegetables  Meal planning/habits: None  Who cooks/prepares meals for you?: Self  Who purchases food in  your home?: Self  How many times a week on average do you eat food made away from home (restaurant/take-out)?: 1  Meals include: Dinner, Breakfast  Breakfast: 10:00 am: toast with salami  Dinner: hotdog on bun with cheese, skinny popcorn or breaded shrimp, french fries  Snacks: chips and salsa  Beverages: Water, Coffee, Diet soda  Has patient met with a dietitian in the past?: Yes    Being Active:  Being Active Assessed Today: Yes  Exercise:: Currently not exercising  Barrier to exercise: Time    Monitoring:  Monitoring Assessed Today: Yes  Blood Glucose Meter: Accu-chek  Times checking blood sugar at home (number): 2  Times checking blood sugar at home (per): Day  Blood glucose trend: " Decreasing    Taking Medications:  Diabetes Medication(s)       Biguanides       metFORMIN (GLUCOPHAGE XR) 500 MG 24 hr tablet Take 2 tablets (1,000 mg) by mouth 2 times daily (with meals)          Taking Medication Assessed Today: Yes  Current Treatments: Oral Medication (taken by mouth)  Problems taking diabetes medications regularly?: No  Diabetes medication side effects?: No    Problem Solving:  Problem Solving Assessed Today: No  Is the patient at risk for hypoglycemia?: No    Reducing Risks:  Reducing Risks Assessed Today: No  Has dilated eye exam at least once a year?: Yes  Sees dentist every 6 months?: Yes  Feet checked by healthcare provider in the last year?: Yes    Healthy Coping:  Healthy Coping Assessed Today: Yes  Emotional response to diabetes: Concern for health and well-being, Ready to learn  Informal Support system:: Family  Stage of change: PREPARATION (Decided to change - considering how)  Support resources: Websites  Patient Activation Measure Survey Score:      4/19/2012    10:00 AM   PRINCE Score (Last Two)   PRINCE Raw Score 39   Activation Score 56.4   PRINCE Level 3       Care Plan and Education Provided:  Care Plan: Diabetes   Updates made by Dayanna Hernandez RD since 6/4/2024 12:00 AM        Problem: HbA1C Not In Goal         Goal: Establish Regular Follow-Ups with PCP         Task: Discuss with PCP the recommended timing for patient's next follow up visit(s)    Responsible User: Dayanna Hernandez RD        Task: Discuss schedule for PCP visits with patient    Responsible User: Dayanna Hernandez RD        Goal: Get HbA1C Level in Goal         Task: Educate patient on diabetes education self-management topics    Responsible User: Dayanna Hernandez RD        Task: Educate patient on benefits of regular glucose monitoring    Responsible User: Dayanna Hernandez RD        Task: Refer patient to appropriate extended care team member, as needed (Medication Therapy Management, Behavioral Health, Physical  Therapy, etc.)    Responsible User: Dayanna Hernandez RD        Task: Discuss diabetes treatment plan with patient    Responsible User: Dayanna Hernandez RD        Problem: Diabetes Self-Management Education Needed to Optimize Self-Care Behaviors         Goal: Understand diabetes pathophysiology and disease progression         Task: Provide education on diabetes pathophysiology and disease progression specfic to patient's diabetes type    Responsible User: Dayanna Hernandez RD        Goal: Healthy Eating - follow a healthy eating pattern for diabetes         Task: Provide education on portion control and consistency in amount, composition and timing of food intake Completed 6/4/2024   Responsible User: Dayanna Hernandez RD        Task: Provide education on managing carbohydrate intake (carbohydrate counting, plate planning method, etc.) Completed 6/4/2024   Responsible User: Dayanna Hernandez RD        Task: Provide education on weight management    Responsible User: Dayanna Hernandez RD        Task: Provide education on heart healthy eating    Responsible User: Dayanna Hernandez RD        Task: Provide education on eating out    Responsible User: Dayanna Hernandez RD        Task: Develop individualized healthy eating plan with patient    Responsible User: Dayanna Hernandez RD        Goal: Being Active - get regular physical activity, working up to at least 150 minutes per week         Task: Provide education on relationship of activity to glucose and precautions to take if at risk for low glucose Completed 6/4/2024   Responsible User: Dayanna Hernandez RD        Task: Discuss barriers to physical activity with patient    Responsible User: Dayanna Hernandez RD        Task: Develop physical activity plan with patient    Responsible User: Dayanna Hernandez RD        Task: Explore community resources including walking groups, assistance programs, and home videos    Responsible User: Dayanna Hernandez RD        Goal: Monitoring -  monitor glucose and ketones as directed         Task: Provide education on blood glucose monitoring (purpose, proper technique, frequency, glucose targets, interpreting results, when to use glucose control solution, sharps disposal) Completed 6/4/2024   Responsible User: Dayanna Hernandez RD        Task: Provide education on continuous glucose monitoring (sensor placement, use of andrzej or /reader, understanding glucose trends, alerts and alarms, differences between sensor glucose and blood glucose)    Responsible User: Dayanna Hernandez RD        Task: Provide education on ketone monitoring (when to monitor, frequency, etc.)    Responsible User: Dayanna Hernandez RD        Goal: Taking Medication - patient is consistently taking medications as directed         Task: Provide education on action of prescribed medication, including when to take and possible side effects Completed 6/4/2024   Responsible User: Dayanna Hernandez RD        Task: Provide education on insulin and injectable diabetes medications, including administration, storage, site selection and rotation for injection sites    Responsible User: Dayanna Hernandez RD        Task: Discuss barriers to medication adherence with patient and provide management technique ideas as appropriate    Responsible User: Dayanna Hernandez RD        Task: Provide education on frequency and refill details of medications    Responsible User: Dayanna Hernandez RD        Goal: Problem Solving - know how to prevent and manage short-term diabetes complications         Task: Provide education on high blood glucose - causes, signs/symptoms, prevention and treatment Completed 6/4/2024   Responsible User: Dayanna Hernandez RD        Task: Provide education on low blood glucose - causes, signs/symptoms, prevention, treatment, carrying a carbohydrate source at all times, and medical identification    Responsible User: Dayanna Hernandez RD        Task: Provide education on safe travel  with diabetes    Responsible User: Dayanna Hernandez RD        Task: Provide education on how to care for diabetes on sick days    Responsible User: Dayanna Hernandez RD        Task: Provide education on when to call a health care provider    Responsible User: Dayanna Hernandez RD        Goal: Reducing Risks - know how to prevent and treat long-term diabetes complications         Task: Provide education on major complications of diabetes, prevention, early diagnostic measures and treatment of complications    Responsible User: Dayanna Hernandez RD        Task: Provide education on recommended care for dental, eye and foot health    Responsible User: Dayanna Hernandez RD        Task: Provide education on Hemoglobin A1c - goals and relationship to blood glucose levels    Responsible User: Dayanna Hernandez RD        Task: Provide education on recommendations for heart health - lipid levels and goals, blood pressure and goals, and aspirin therapy, if indicated    Responsible User: Dayanna Hernandez RD        Task: Provide education on tobacco cessation    Responsible User: Dayanna Hernandez RD        Goal: Healthy Coping - use available resources to cope with the challenges of managing diabetes         Task: Discuss recognizing feelings about having diabetes    Responsible User: Dayanna Hernandez RD        Task: Provide education on the benefits of making appropriate lifestyle changes    Responsible User: Dayanna Hernandez RD        Task: Provide education on benefits of utilizing support systems    Responsible User: Dayanna Hernandez RD        Task: Discuss methods for coping with stress Completed 6/4/2024   Responsible User: Dayanna Hernandez RD        Task: Provide education on when to seek professional counseling    Responsible User: Dayanna Hernandez RD Mary Spencer RD, [facility]  Diabetes     Time Spent: 40 minutes  Encounter Type: Individual    Any diabetes medication dose changes were made via the  CDE Protocol per the patient's primary care provider. A copy of this encounter was shared with the provider.

## 2024-06-28 ENCOUNTER — TELEPHONE (OUTPATIENT)
Dept: FAMILY MEDICINE | Facility: CLINIC | Age: 59
End: 2024-06-28
Payer: COMMERCIAL

## 2024-06-28 NOTE — TELEPHONE ENCOUNTER
Forms/Letter Request    Type of form/letter: FMLA - Unknown      Do we have the form/letter: Yes:     Who is the form from?  Employer (if other please explain)    Where did/will the form come from? Patient or family brought in       When is form/letter needed by: as soon as possible    How would you like the form/letter returned:     Patient Notified form requests are processed in 5-7 business days:Yes      Forms placed in Dr. Cullen's folder.      Alina Chappell Patient

## 2024-07-02 ENCOUNTER — MYC MEDICAL ADVICE (OUTPATIENT)
Dept: FAMILY MEDICINE | Facility: CLINIC | Age: 59
End: 2024-07-02
Payer: COMMERCIAL

## 2024-07-03 NOTE — TELEPHONE ENCOUNTER
Pt calling in regards to [place] paperwork. Saw [employer] message from yesterday about Dr. Cullen calling pt to complete paperwork. Pt wondering if Dr. Cullen will be able to call him today to get forms completed?      Alina Chappell Patient

## 2024-07-03 NOTE — TELEPHONE ENCOUNTER
"Mychart Message from 7/2/24 f/ Dr. Cullen     \"CONRAD Alvarenga,   I will call you this evening for completing the paperwork.  Thanks  Dr Cullen\"    Please advise.     Melvina Bazzi RN on 7/3/2024 at 10:09 AM    "

## 2024-07-30 ENCOUNTER — VIRTUAL VISIT (OUTPATIENT)
Dept: EDUCATION SERVICES | Facility: CLINIC | Age: 59
End: 2024-07-30
Payer: COMMERCIAL

## 2024-07-30 DIAGNOSIS — E11.9 TYPE 2 DIABETES MELLITUS WITHOUT COMPLICATION, WITHOUT LONG-TERM CURRENT USE OF INSULIN (H): Primary | ICD-10-CM

## 2024-07-30 PROCEDURE — G0108 DIAB MANAGE TRN  PER INDIV: HCPCS | Mod: 93 | Performed by: DIETITIAN, REGISTERED

## 2024-07-30 NOTE — LETTER
"    7/30/2024         RE: Lyle Tenorio  [address]  [place] [address]        Dear Colleague,    Thank you for referring your patient, Lyle Tenorio, to the [facility]. Please see a copy of my visit note below.    Diabetes Self-Management Education & Support    Presents for: Follow-up    Type of Service: Telephone Visit    Originating Location (Patient Location): Home  Distant Location (Provider Location): Offsite  Mode of Communication:  Telephone    Telephone Visit Start Time:  11:00  Telephone Visit End Time (telephone visit stop time): 11:30    How would patient like to obtain AVS? MyChart      ASSESSMENT:  Pt reports now feeling very well. Is taking Metformin 2000 mg daily, no negative side effects. Continues to notice decreased appetite and is losing weight. Pt tests fasting blood sugars several times/ week, reports \"usually around 130 mg/dl\". Answered all questions. Reminded pt will be due for A1c re-check next month. Pt agreeable.     Patient's most recent   Lab Results   Component Value Date    A1C 11.7 05/09/2024    A1C 6.3 10/23/2020     is not meeting goal of <7.0    Diabetes knowledge and skills assessment:   Patient is knowledgeable in diabetes management concepts related to: Monitoring and Taking Medication    Continue education with the following diabetes management concepts: Healthy Eating, Problem Solving, and Reducing Risks    Based on learning assessment above, most appropriate setting for further diabetes education would be: Individual setting.      PLAN    Follow-up: as needed, and annually    See Care Plan for co-developed, patient-state behavior change goals.  AVS provided for patient today.    Education Materials Provided:  No new materials provided today      SUBJECTIVE/OBJECTIVE:  Presents for: Follow-up  Accompanied by: Self  Diabetes education in the past 24mo: Yes  Focus of Visit: Taking Medication, Healthy Eating, Problem Solving  Diabetes type: Type " "2  Disease course: Improving  How confident are you filling out medical forms by yourself:: Quite a bit  Transportation concerns: No  Other concerns:: Glasses  Cultural Influences/Ethnic Background:  Not  or     Diabetes Symptoms & Complications:  Diabetes Related Symptoms: Fatigue, Polydipsia (increased thirst), Polyuria (increased urination)  Weight trend: Decreasing  Symptom course: Resolved  Disease course: Improving  Complications assessed today?: No    Patient Problem List and Family Medical History reviewed for relevant medical history, current medical status, and diabetes risk factors.    Vitals:  There were no vitals taken for this visit.  Estimated body mass index is 39.61 kg/m  as calculated from the following:    Height as of 5/20/24: 1.791 m (5' 10.5\").    Weight as of 5/20/24: 127 kg (280 lb).   Last 3 BP:   BP Readings from Last 3 Encounters:   05/20/24 138/68   05/09/24 (!) 160/78   10/30/23 (!) 163/90       History   Smoking Status     Former     Packs/day: 1.00     Years: 25.00     Types: Cigarettes     Quit date: 3/28/2010   Smokeless Tobacco     Never       Labs:  Lab Results   Component Value Date    A1C 11.7 05/09/2024    A1C 6.3 10/23/2020     Lab Results   Component Value Date     05/09/2024     11/04/2021     10/23/2020     Lab Results   Component Value Date    LDL 47 05/09/2024    LDL 54 10/23/2020     HDL Cholesterol   Date Value Ref Range Status   10/23/2020 62 >39 mg/dL Final     Direct Measure HDL   Date Value Ref Range Status   05/09/2024 32 (L) >=40 mg/dL Final   ]  GFR Estimate   Date Value Ref Range Status   05/09/2024 >90 >60 mL/min/1.73m2 Final   10/23/2020 >90 >60 mL/min/[1.73_m2] Final     Comment:     Non  GFR Calc  Starting 12/18/2018, serum creatinine based estimated GFR (eGFR) will be   calculated using the Chronic Kidney Disease Epidemiology Collaboration   (CKD-EPI) equation.       GFR Estimate If Black   Date Value Ref " "Range Status   10/23/2020 >90 >60 mL/min/[1.73_m2] Final     Comment:      GFR Calc  Starting 12/18/2018, serum creatinine based estimated GFR (eGFR) will be   calculated using the Chronic Kidney Disease Epidemiology Collaboration   (CKD-EPI) equation.       Lab Results   Component Value Date    CR 0.82 05/09/2024    CR 0.84 10/23/2020     No results found for: \"MICROALBUMIN\"    Healthy Eating:  Healthy Eating Assessed Today: Yes  Cultural/Zoroastrian diet restrictions?: No  Do you have any food allergies or intolerances?: Yes  Please list your food allergies / intolerances: greens and many other vegetables  Meal planning/habits: Smaller portions  Who cooks/prepares meals for you?: Self  Who purchases food in  your home?: Self  How many times a week on average do you eat food made away from home (restaurant/take-out)?: 1  Meals include: Dinner, Breakfast  Breakfast: 10:00 am: toast with salami  Dinner: hotdog on bun with cheese, skinny popcorn or breaded shrimp, french fries  Snacks: chips and salsa  Beverages: Water, Coffee, Diet soda  Has patient met with a dietitian in the past?: Yes    Being Active:  Being Active Assessed Today: Yes  Exercise:: Currently not exercising  Barrier to exercise: Time    Monitoring:  Monitoring Assessed Today: Yes  Blood Glucose Meter: Accu-chek  Times checking blood sugar at home (number): 5+  Times checking blood sugar at home (per): Week  Blood glucose trend: Decreasing    Taking Medications:  Diabetes Medication(s)       Biguanides       metFORMIN (GLUCOPHAGE XR) 500 MG 24 hr tablet Take 2 tablets (1,000 mg) by mouth 2 times daily (with meals)            Taking Medication Assessed Today: Yes  Current Treatments: Oral Medication (taken by mouth)  Problems taking diabetes medications regularly?: No  Diabetes medication side effects?: No    Problem Solving:  Problem Solving Assessed Today: No  Is the patient at risk for hypoglycemia?: No    Reducing Risks:  Reducing " Risks Assessed Today: No  Has dilated eye exam at least once a year?: Yes  Sees dentist every 6 months?: Yes  Feet checked by healthcare provider in the last year?: Yes    Healthy Coping:  Healthy Coping Assessed Today: Yes  Emotional response to diabetes: Concern for health and well-being, Ready to learn  Informal Support system:: Family  Stage of change: ACTION (Actively working towards change)  Support resources: Websites  Patient Activation Measure Survey Score:      4/19/2012    10:00 AM   PRINCE Score (Last Two)   PRINCE Raw Score 39   Activation Score 56.4   PRINCE Level 3       Care Plan and Education Provided:  Care Plan: Diabetes   Updates made by Dayanna Hernandez RD since 7/30/2024 12:00 AM        Problem: HbA1C Not In Goal         Goal: Establish Regular Follow-Ups with PCP         Task: Discuss schedule for PCP visits with patient Completed 7/30/2024   Responsible User: Dayanna Hernandez RD        Goal: Get HbA1C Level in Goal         Task: Educate patient on diabetes education self-management topics Completed 7/30/2024   Responsible User: Dayanna Hernandez RD        Task: Educate patient on benefits of regular glucose monitoring Completed 7/30/2024   Responsible User: Dayanna Hernandez RD        Task: Discuss diabetes treatment plan with patient Completed 7/30/2024   Responsible User: Dayanna Hernandez RD        Problem: Diabetes Self-Management Education Needed to Optimize Self-Care Behaviors         Goal: Healthy Eating - follow a healthy eating pattern for diabetes         Task: Provide education on weight management Completed 7/30/2024   Responsible User: Dayanna Hernandez RD        Goal: Reducing Risks - know how to prevent and treat long-term diabetes complications         Task: Provide education on recommended care for dental, eye and foot health Completed 7/30/2024   Responsible User: Dayanna Hernandez RD        Task: Provide education on Hemoglobin A1c - goals and relationship to blood glucose levels  Completed 7/30/2024   Responsible User: Dayanna Hernandez RD Mary Spencer RD, [facility]  Diabetes     Time Spent: 30 minutes  Encounter Type: Individual    Any diabetes medication dose changes were made via the CDE Protocol per the patient's primary care provider. A copy of this encounter was shared with the provider.

## 2024-07-30 NOTE — PROGRESS NOTES
"Diabetes Self-Management Education & Support    Presents for: Follow-up    Type of Service: Telephone Visit    Originating Location (Patient Location): Home  Distant Location (Provider Location): Offsite  Mode of Communication:  Telephone    Telephone Visit Start Time:  11:00  Telephone Visit End Time (telephone visit stop time): 11:30    How would patient like to obtain AVS? Abner      ASSESSMENT:  Pt reports now feeling very well. Is taking Metformin 2000 mg daily, no negative side effects. Continues to notice decreased appetite and is losing weight. Pt tests fasting blood sugars several times/ week, reports \"usually around 130 mg/dl\". Answered all questions. Reminded pt will be due for A1c re-check next month. Pt agreeable.     Patient's most recent   Lab Results   Component Value Date    A1C 11.7 05/09/2024    A1C 6.3 10/23/2020     is not meeting goal of <7.0    Diabetes knowledge and skills assessment:   Patient is knowledgeable in diabetes management concepts related to: Monitoring and Taking Medication    Continue education with the following diabetes management concepts: Healthy Eating, Problem Solving, and Reducing Risks    Based on learning assessment above, most appropriate setting for further diabetes education would be: Individual setting.      PLAN    Follow-up: as needed, and annually    See Care Plan for co-developed, patient-state behavior change goals.  AVS provided for patient today.    Education Materials Provided:  No new materials provided today      SUBJECTIVE/OBJECTIVE:  Presents for: Follow-up  Accompanied by: Self  Diabetes education in the past 24mo: Yes  Focus of Visit: Taking Medication, Healthy Eating, Problem Solving  Diabetes type: Type 2  Disease course: Improving  How confident are you filling out medical forms by yourself:: Quite a bit  Transportation concerns: No  Other concerns:: Glasses  Cultural Influences/Ethnic Background:  Not  or     Diabetes Symptoms & " "Complications:  Diabetes Related Symptoms: Fatigue, Polydipsia (increased thirst), Polyuria (increased urination)  Weight trend: Decreasing  Symptom course: Resolved  Disease course: Improving  Complications assessed today?: No    Patient Problem List and Family Medical History reviewed for relevant medical history, current medical status, and diabetes risk factors.    Vitals:  There were no vitals taken for this visit.  Estimated body mass index is 39.61 kg/m  as calculated from the following:    Height as of 5/20/24: 1.791 m (5' 10.5\").    Weight as of 5/20/24: 127 kg (280 lb).   Last 3 BP:   BP Readings from Last 3 Encounters:   05/20/24 138/68   05/09/24 (!) 160/78   10/30/23 (!) 163/90       History   Smoking Status    Former    Packs/day: 1.00    Years: 25.00    Types: Cigarettes    Quit date: 3/28/2010   Smokeless Tobacco    Never       Labs:  Lab Results   Component Value Date    A1C 11.7 05/09/2024    A1C 6.3 10/23/2020     Lab Results   Component Value Date     05/09/2024     11/04/2021     10/23/2020     Lab Results   Component Value Date    LDL 47 05/09/2024    LDL 54 10/23/2020     HDL Cholesterol   Date Value Ref Range Status   10/23/2020 62 >39 mg/dL Final     Direct Measure HDL   Date Value Ref Range Status   05/09/2024 32 (L) >=40 mg/dL Final   ]  GFR Estimate   Date Value Ref Range Status   05/09/2024 >90 >60 mL/min/1.73m2 Final   10/23/2020 >90 >60 mL/min/[1.73_m2] Final     Comment:     Non  GFR Calc  Starting 12/18/2018, serum creatinine based estimated GFR (eGFR) will be   calculated using the Chronic Kidney Disease Epidemiology Collaboration   (CKD-EPI) equation.       GFR Estimate If Black   Date Value Ref Range Status   10/23/2020 >90 >60 mL/min/[1.73_m2] Final     Comment:      GFR Calc  Starting 12/18/2018, serum creatinine based estimated GFR (eGFR) will be   calculated using the Chronic Kidney Disease Epidemiology Collaboration " "  (CKD-EPI) equation.       Lab Results   Component Value Date    CR 0.82 05/09/2024    CR 0.84 10/23/2020     No results found for: \"MICROALBUMIN\"    Healthy Eating:  Healthy Eating Assessed Today: Yes  Cultural/Muslim diet restrictions?: No  Do you have any food allergies or intolerances?: Yes  Please list your food allergies / intolerances: greens and many other vegetables  Meal planning/habits: Smaller portions  Who cooks/prepares meals for you?: Self  Who purchases food in  your home?: Self  How many times a week on average do you eat food made away from home (restaurant/take-out)?: 1  Meals include: Dinner, Breakfast  Breakfast: 10:00 am: toast with salami  Dinner: hotdog on bun with cheese, skinny popcorn or breaded shrimp, french fries  Snacks: chips and salsa  Beverages: Water, Coffee, Diet soda  Has patient met with a dietitian in the past?: Yes    Being Active:  Being Active Assessed Today: Yes  Exercise:: Currently not exercising  Barrier to exercise: Time    Monitoring:  Monitoring Assessed Today: Yes  Blood Glucose Meter: Accu-chek  Times checking blood sugar at home (number): 5+  Times checking blood sugar at home (per): Week  Blood glucose trend: Decreasing    Taking Medications:  Diabetes Medication(s)       Biguanides       metFORMIN (GLUCOPHAGE XR) 500 MG 24 hr tablet Take 2 tablets (1,000 mg) by mouth 2 times daily (with meals)            Taking Medication Assessed Today: Yes  Current Treatments: Oral Medication (taken by mouth)  Problems taking diabetes medications regularly?: No  Diabetes medication side effects?: No    Problem Solving:  Problem Solving Assessed Today: No  Is the patient at risk for hypoglycemia?: No    Reducing Risks:  Reducing Risks Assessed Today: No  Has dilated eye exam at least once a year?: Yes  Sees dentist every 6 months?: Yes  Feet checked by healthcare provider in the last year?: Yes    Healthy Coping:  Healthy Coping Assessed Today: Yes  Emotional response to " diabetes: Concern for health and well-being, Ready to learn  Informal Support system:: Family  Stage of change: ACTION (Actively working towards change)  Support resources: Websites  Patient Activation Measure Survey Score:      4/19/2012    10:00 AM   PRINCE Score (Last Two)   PRINCE Raw Score 39   Activation Score 56.4   PRINCE Level 3       Care Plan and Education Provided:  Care Plan: Diabetes   Updates made by Dayanna Hernandez RD since 7/30/2024 12:00 AM        Problem: HbA1C Not In Goal         Goal: Establish Regular Follow-Ups with PCP         Task: Discuss schedule for PCP visits with patient Completed 7/30/2024   Responsible User: Dayanna Hernandez RD        Goal: Get HbA1C Level in Goal         Task: Educate patient on diabetes education self-management topics Completed 7/30/2024   Responsible User: Dayanna Hernandez RD        Task: Educate patient on benefits of regular glucose monitoring Completed 7/30/2024   Responsible User: Dayanna Hernandez RD        Task: Discuss diabetes treatment plan with patient Completed 7/30/2024   Responsible User: Dayanna Hernandez RD        Problem: Diabetes Self-Management Education Needed to Optimize Self-Care Behaviors         Goal: Healthy Eating - follow a healthy eating pattern for diabetes         Task: Provide education on weight management Completed 7/30/2024   Responsible User: Dayanna Hernandez RD        Goal: Reducing Risks - know how to prevent and treat long-term diabetes complications         Task: Provide education on recommended care for dental, eye and foot health Completed 7/30/2024   Responsible User: Dayanna Hernandez RD        Task: Provide education on Hemoglobin A1c - goals and relationship to blood glucose levels Completed 7/30/2024   Responsible User: Dayanna Hernandez RD Mary Spencer RD, [facility]  Diabetes     Time Spent: 30 minutes  Encounter Type: Individual    Any diabetes medication dose changes were made via the CDE Protocol per  the patient's primary care provider. A copy of this encounter was shared with the provider.

## 2024-09-25 DIAGNOSIS — E11.9 TYPE 2 DIABETES MELLITUS WITHOUT COMPLICATION, WITHOUT LONG-TERM CURRENT USE OF INSULIN (H): ICD-10-CM

## 2024-09-25 RX ORDER — METFORMIN HCL 500 MG
1000 TABLET, EXTENDED RELEASE 24 HR ORAL 2 TIMES DAILY WITH MEALS
Qty: 180 TABLET | Refills: 0 | OUTPATIENT
Start: 2024-09-25

## 2024-09-25 NOTE — TELEPHONE ENCOUNTER
Attempted to call pt. Static on line then call ends. Will try again later. Requesting metformin     Alina Chappell Patient

## 2024-09-25 NOTE — TELEPHONE ENCOUNTER
Overdue for needed care. Please call to schedule lab only appointment for A1c recheck, has standing order in place. Once appt is scheduled, route back to the pool.    Julie Behrendt RN

## 2024-09-27 DIAGNOSIS — E11.9 TYPE 2 DIABETES MELLITUS WITHOUT COMPLICATION, WITHOUT LONG-TERM CURRENT USE OF INSULIN (H): ICD-10-CM

## 2024-09-30 RX ORDER — METFORMIN HCL 500 MG
1000 TABLET, EXTENDED RELEASE 24 HR ORAL 2 TIMES DAILY WITH MEALS
Qty: 180 TABLET | Refills: 0 | Status: SHIPPED | OUTPATIENT
Start: 2024-09-30

## 2024-10-07 ENCOUNTER — LAB (OUTPATIENT)
Dept: LAB | Facility: CLINIC | Age: 59
End: 2024-10-07
Payer: COMMERCIAL

## 2024-10-07 ENCOUNTER — TELEPHONE (OUTPATIENT)
Dept: FAMILY MEDICINE | Facility: CLINIC | Age: 59
End: 2024-10-07

## 2024-10-07 DIAGNOSIS — E11.9 TYPE 2 DIABETES MELLITUS WITHOUT COMPLICATION, WITHOUT LONG-TERM CURRENT USE OF INSULIN (H): ICD-10-CM

## 2024-10-07 LAB
EST. AVERAGE GLUCOSE BLD GHB EST-MCNC: 134 MG/DL
HBA1C MFR BLD: 6.3 % (ref 0–5.6)

## 2024-10-07 PROCEDURE — 83036 HEMOGLOBIN GLYCOSYLATED A1C: CPT

## 2024-10-07 PROCEDURE — 36415 COLL VENOUS BLD VENIPUNCTURE: CPT

## 2025-02-06 DIAGNOSIS — E11.9 TYPE 2 DIABETES MELLITUS WITHOUT COMPLICATION, WITHOUT LONG-TERM CURRENT USE OF INSULIN (H): ICD-10-CM

## 2025-02-06 RX ORDER — METFORMIN HYDROCHLORIDE 500 MG/1
1000 TABLET, EXTENDED RELEASE ORAL 2 TIMES DAILY WITH MEALS
Qty: 360 TABLET | Refills: 0 | Status: SHIPPED | OUTPATIENT
Start: 2025-02-06

## 2025-04-01 ENCOUNTER — TRANSFERRED RECORDS (OUTPATIENT)
Dept: HEALTH INFORMATION MANAGEMENT | Facility: CLINIC | Age: 60
End: 2025-04-01
Payer: COMMERCIAL

## 2025-04-19 ENCOUNTER — HEALTH MAINTENANCE LETTER (OUTPATIENT)
Age: 60
End: 2025-04-19

## 2025-04-28 ENCOUNTER — LAB (OUTPATIENT)
Dept: LAB | Facility: CLINIC | Age: 60
End: 2025-04-28
Payer: COMMERCIAL

## 2025-04-28 DIAGNOSIS — M1A.0710 CHRONIC GOUT OF RIGHT ANKLE, UNSPECIFIED CAUSE: ICD-10-CM

## 2025-04-28 DIAGNOSIS — Z13.6 SCREENING FOR CARDIOVASCULAR CONDITION: ICD-10-CM

## 2025-04-28 DIAGNOSIS — E11.9 TYPE 2 DIABETES MELLITUS WITHOUT COMPLICATION, WITHOUT LONG-TERM CURRENT USE OF INSULIN (H): Primary | ICD-10-CM

## 2025-04-28 DIAGNOSIS — I10 ESSENTIAL HYPERTENSION WITH GOAL BLOOD PRESSURE LESS THAN 140/90: ICD-10-CM

## 2025-04-28 LAB
ANION GAP SERPL CALCULATED.3IONS-SCNC: 16 MMOL/L (ref 7–15)
BUN SERPL-MCNC: 15.7 MG/DL (ref 6–20)
CALCIUM SERPL-MCNC: 9.6 MG/DL (ref 8.8–10.4)
CHLORIDE SERPL-SCNC: 106 MMOL/L (ref 98–107)
CHOLEST SERPL-MCNC: 102 MG/DL
CREAT SERPL-MCNC: 0.89 MG/DL (ref 0.67–1.17)
CREAT UR-MCNC: 182.9 MG/DL
EGFRCR SERPLBLD CKD-EPI 2021: >90 ML/MIN/1.73M2
EST. AVERAGE GLUCOSE BLD GHB EST-MCNC: 126 MG/DL
FASTING STATUS PATIENT QL REPORTED: YES
FASTING STATUS PATIENT QL REPORTED: YES
GLUCOSE SERPL-MCNC: 143 MG/DL (ref 70–99)
HBA1C MFR BLD: 6 % (ref 0–5.6)
HCO3 SERPL-SCNC: 20 MMOL/L (ref 22–29)
HDLC SERPL-MCNC: 55 MG/DL
LDLC SERPL CALC-MCNC: 36 MG/DL
MICROALBUMIN UR-MCNC: 33.2 MG/L
MICROALBUMIN/CREAT UR: 18.15 MG/G CR (ref 0–17)
NONHDLC SERPL-MCNC: 47 MG/DL
POTASSIUM SERPL-SCNC: 4.8 MMOL/L (ref 3.4–5.3)
SODIUM SERPL-SCNC: 142 MMOL/L (ref 135–145)
TRIGL SERPL-MCNC: 57 MG/DL
URATE SERPL-MCNC: 4.9 MG/DL (ref 3.4–7)

## 2025-04-28 PROCEDURE — 82570 ASSAY OF URINE CREATININE: CPT

## 2025-04-28 PROCEDURE — 83036 HEMOGLOBIN GLYCOSYLATED A1C: CPT

## 2025-04-28 PROCEDURE — 80061 LIPID PANEL: CPT

## 2025-04-28 PROCEDURE — 84550 ASSAY OF BLOOD/URIC ACID: CPT

## 2025-04-28 PROCEDURE — 82043 UR ALBUMIN QUANTITATIVE: CPT

## 2025-04-28 PROCEDURE — 36415 COLL VENOUS BLD VENIPUNCTURE: CPT

## 2025-04-28 PROCEDURE — 80048 BASIC METABOLIC PNL TOTAL CA: CPT

## 2025-05-03 DIAGNOSIS — E11.9 TYPE 2 DIABETES MELLITUS WITHOUT COMPLICATION, WITHOUT LONG-TERM CURRENT USE OF INSULIN (H): ICD-10-CM

## 2025-05-05 RX ORDER — METFORMIN HYDROCHLORIDE 500 MG/1
1000 TABLET, EXTENDED RELEASE ORAL 2 TIMES DAILY WITH MEALS
Qty: 360 TABLET | Refills: 1 | Status: SHIPPED | OUTPATIENT
Start: 2025-05-05

## 2025-05-08 ENCOUNTER — PATIENT OUTREACH (OUTPATIENT)
Dept: CARE COORDINATION | Facility: CLINIC | Age: 60
End: 2025-05-08
Payer: COMMERCIAL

## 2025-05-13 ENCOUNTER — OFFICE VISIT (OUTPATIENT)
Dept: FAMILY MEDICINE | Facility: CLINIC | Age: 60
End: 2025-05-13
Payer: COMMERCIAL

## 2025-05-13 ENCOUNTER — RESULTS FOLLOW-UP (OUTPATIENT)
Dept: FAMILY MEDICINE | Facility: CLINIC | Age: 60
End: 2025-05-13

## 2025-05-13 VITALS
SYSTOLIC BLOOD PRESSURE: 136 MMHG | RESPIRATION RATE: 20 BRPM | OXYGEN SATURATION: 98 % | HEIGHT: 70 IN | BODY MASS INDEX: 40.06 KG/M2 | DIASTOLIC BLOOD PRESSURE: 60 MMHG | WEIGHT: 279.8 LBS | HEART RATE: 78 BPM | TEMPERATURE: 97.8 F

## 2025-05-13 DIAGNOSIS — E78.5 HYPERLIPIDEMIA LDL GOAL <100: ICD-10-CM

## 2025-05-13 DIAGNOSIS — Z12.5 SCREENING FOR PROSTATE CANCER: ICD-10-CM

## 2025-05-13 DIAGNOSIS — Z86.73 H/O: STROKE: ICD-10-CM

## 2025-05-13 DIAGNOSIS — M1A.0710 CHRONIC GOUT OF RIGHT ANKLE, UNSPECIFIED CAUSE: ICD-10-CM

## 2025-05-13 DIAGNOSIS — Z00.00 ROUTINE GENERAL MEDICAL EXAMINATION AT A HEALTH CARE FACILITY: Primary | ICD-10-CM

## 2025-05-13 DIAGNOSIS — E11.9 TYPE 2 DIABETES MELLITUS WITHOUT COMPLICATION, WITHOUT LONG-TERM CURRENT USE OF INSULIN (H): ICD-10-CM

## 2025-05-13 DIAGNOSIS — K21.9 GASTROESOPHAGEAL REFLUX DISEASE WITHOUT ESOPHAGITIS: ICD-10-CM

## 2025-05-13 LAB — PSA SERPL DL<=0.01 NG/ML-MCNC: 0.16 NG/ML (ref 0–3.5)

## 2025-05-13 PROCEDURE — 99396 PREV VISIT EST AGE 40-64: CPT | Performed by: FAMILY MEDICINE

## 2025-05-13 PROCEDURE — 99214 OFFICE O/P EST MOD 30 MIN: CPT | Mod: 25 | Performed by: FAMILY MEDICINE

## 2025-05-13 PROCEDURE — 36415 COLL VENOUS BLD VENIPUNCTURE: CPT | Performed by: FAMILY MEDICINE

## 2025-05-13 PROCEDURE — G0103 PSA SCREENING: HCPCS | Performed by: FAMILY MEDICINE

## 2025-05-13 PROCEDURE — G2211 COMPLEX E/M VISIT ADD ON: HCPCS | Performed by: FAMILY MEDICINE

## 2025-05-13 RX ORDER — LANSOPRAZOLE 30 MG/1
CAPSULE, DELAYED RELEASE ORAL
Qty: 90 CAPSULE | Refills: 3 | Status: SHIPPED | OUTPATIENT
Start: 2025-05-13

## 2025-05-13 RX ORDER — CLOPIDOGREL BISULFATE 75 MG/1
75 TABLET ORAL DAILY
Qty: 90 TABLET | Refills: 3 | Status: SHIPPED | OUTPATIENT
Start: 2025-05-13

## 2025-05-13 RX ORDER — ALLOPURINOL 300 MG/1
450 TABLET ORAL DAILY
Qty: 135 TABLET | Refills: 3 | Status: SHIPPED | OUTPATIENT
Start: 2025-05-13

## 2025-05-13 RX ORDER — INDOMETHACIN 50 MG/1
CAPSULE ORAL
Qty: 30 CAPSULE | Refills: 3 | Status: SHIPPED | OUTPATIENT
Start: 2025-05-13

## 2025-05-13 RX ORDER — LISINOPRIL 2.5 MG/1
2.5 TABLET ORAL DAILY
Qty: 90 TABLET | Refills: 3 | Status: SHIPPED | OUTPATIENT
Start: 2025-05-13

## 2025-05-13 RX ORDER — ATORVASTATIN CALCIUM 20 MG/1
20 TABLET, FILM COATED ORAL DAILY
Qty: 90 TABLET | Refills: 3 | Status: SHIPPED | OUTPATIENT
Start: 2025-05-13

## 2025-05-13 RX ORDER — METFORMIN HYDROCHLORIDE 500 MG/1
1000 TABLET, EXTENDED RELEASE ORAL 2 TIMES DAILY WITH MEALS
Qty: 360 TABLET | Refills: 3 | Status: SHIPPED | OUTPATIENT
Start: 2025-05-13

## 2025-05-13 SDOH — HEALTH STABILITY: PHYSICAL HEALTH: ON AVERAGE, HOW MANY MINUTES DO YOU ENGAGE IN EXERCISE AT THIS LEVEL?: 10 MIN

## 2025-05-13 SDOH — HEALTH STABILITY: PHYSICAL HEALTH: ON AVERAGE, HOW MANY DAYS PER WEEK DO YOU ENGAGE IN MODERATE TO STRENUOUS EXERCISE (LIKE A BRISK WALK)?: 1 DAY

## 2025-05-13 ASSESSMENT — PAIN SCALES - GENERAL: PAINLEVEL_OUTOF10: NO PAIN (0)

## 2025-05-13 ASSESSMENT — SOCIAL DETERMINANTS OF HEALTH (SDOH): HOW OFTEN DO YOU GET TOGETHER WITH FRIENDS OR RELATIVES?: ONCE A WEEK

## 2025-05-13 NOTE — LETTER
May 19, 2025      Lyle NIRU Jona  [address]  [place] [address]        Dear ,    We are writing to inform you of your test results.    PSA (marker of prostate cancer) level came back normal.     Let us know if there are any questions.    Resulted Orders   PSA, screen   Result Value Ref Range    Prostate Specific Antigen Screen 0.16 0.00 - 3.50 ng/mL    Narrative    This result is obtained using the Roche Elecsys total PSA method on the nataly e601 immunoassay analyzer, which is an ultrasensitive method. Results obtained with different assay methods or kits cannot be used interchangeably.  This test is intended for initial prostate cancer screening. PSA values exceeding the age-specific limits are suspicious for prostate disease, but additional testing, such as prostate biopsy, is needed to diagnose prostate pathology. The American Cancer Society recommends annual examination with digital rectal examination and serum PSA beginning at age 50 and for men with a life expectancy of at least 10 years after detection of prostate cancer. For men in high-risk groups, such as  Americans or men with a first-degree relative diagnosed at a younger age, testing should begin at a younger age. It is generally recommended that information be provided to patients about the benefits and limitations of testing and treatment so they can make informed decisions.       If you have any questions or concerns, please call the clinic at the number listed above.       Sincerely,      Gurinder Cullen MD    Electronically signed

## 2025-05-13 NOTE — PATIENT INSTRUCTIONS
Patient Education   Preventive Care Advice   This is general advice given by our system to help you stay healthy. However, your care team may have specific advice just for you. Please talk to your care team about your preventive care needs.  Nutrition  Eat 5 or more servings of fruits and vegetables each day.  Try wheat bread, brown rice and whole grain pasta (instead of white bread, rice, and pasta).  Get enough calcium and vitamin D. Check the label on foods and aim for 100% of the RDA (recommended daily allowance).  Lifestyle  Exercise at least 150 minutes each week  (30 minutes a day, 5 days a week).  Do muscle strengthening activities 2 days a week. These help control your weight and prevent disease.  No smoking.  Wear sunscreen to prevent skin cancer.  Have a dental exam and cleaning every 6 months.  Yearly exams  See your health care team every year to talk about:  Any changes in your health.  Any medicines your care team has prescribed.  Preventive care, family planning, and ways to prevent chronic diseases.  Shots (vaccines)   HPV shots (up to age 26), if you've never had them before.  Hepatitis B shots (up to age 59), if you've never had them before.  COVID-19 shot: Get this shot when it's due.  Flu shot: Get a flu shot every year.  Tetanus shot: Get a tetanus shot every 10 years.  Pneumococcal, hepatitis A, and RSV shots: Ask your care team if you need these based on your risk.  Shingles shot (for age 50 and up)  General health tests  Diabetes screening:  Starting at age 35, Get screened for diabetes at least every 3 years.  If you are younger than age 35, ask your care team if you should be screened for diabetes.  Cholesterol test: At age 39, start having a cholesterol test every 5 years, or more often if advised.  Bone density scan (DEXA): At age 50, ask your care team if you should have this scan for osteoporosis (brittle bones).  Hepatitis C: Get tested at least once in your life.  STIs (sexually  transmitted infections)  Before age 24: Ask your care team if you should be screened for STIs.  After age 24: Get screened for STIs if you're at risk. You are at risk for STIs (including HIV) if:  You are sexually active with more than one person.  You don't use condoms every time.  You or a partner was diagnosed with a sexually transmitted infection.  If you are at risk for HIV, ask about PrEP medicine to prevent HIV.  Get tested for HIV at least once in your life, whether you are at risk for HIV or not.  Cancer screening tests  Cervical cancer screening: If you have a cervix, begin getting regular cervical cancer screening tests starting at age 21.  Breast cancer scan (mammogram): If you've ever had breasts, begin having regular mammograms starting at age 40. This is a scan to check for breast cancer.  Colon cancer screening: It is important to start screening for colon cancer at age 45.  Have a colonoscopy test every 10 years (or more often if you're at risk) Or, ask your provider about stool tests like a FIT test every year or Cologuard test every 3 years.  To learn more about your testing options, visit:   .  For help making a decision, visit:   https://bit.ly/no49051.  Prostate cancer screening test: If you have a prostate, ask your care team if a prostate cancer screening test (PSA) at age 55 is right for you.  Lung cancer screening: If you are a current or former smoker ages 50 to 80, ask your care team if ongoing lung cancer screenings are right for you.  For informational purposes only. Not to replace the advice of your health care provider. Copyright   2023 [facility]. All rights reserved. Clinically reviewed by the [facility]. Chat[employer] [id number] - REV 01/24.  Preventing Falls: Care Instructions  Injuries and health problems such as trouble walking or poor eyesight can increase your risk of falling. So can some medicines. But there are things you can do to help  "prevent falls. You can exercise to get stronger. You can also arrange your home to make it safer.    Talk to your doctor about the medicines you take. Ask if any of them increase the risk of falls and whether they can be changed or stopped.   Try to exercise regularly. It can help improve your strength and balance. This can help lower your risk of falling.         Practice fall safety and prevention.   Wear low-heeled shoes that fit well and give your feet good support. Talk to your doctor if you have foot problems that make this hard.  Carry a cellphone or wear a medical alert device that you can use to call for help.  Use stepladders instead of chairs to reach high objects. Don't climb if you're at risk for falls. Ask for help, if needed.  Wear the correct eyeglasses, if you need them.        Make your home safer.   Remove rugs, cords, clutter, and furniture from walkways.  Keep your house well lit. Use night-lights in hallways and bathrooms.  Install and use sturdy handrails on stairways.  Wear nonskid footwear, even inside. Don't walk barefoot or in socks without shoes.        Be safe outside.   Use handrails, curb cuts, and ramps whenever possible.  Keep your hands free by using a shoulder bag or backpack.  Try to walk in well-lit areas. Watch out for uneven ground, changes in pavement, and debris.  Be careful in the winter. Walk on the grass or gravel when sidewalks are slippery. Use de-icer on steps and walkways. Add non-slip devices to shoes.    Put grab bars and nonskid mats in your shower or tub and near the toilet. Try to use a shower chair or bath bench when bathing.   Get into a tub or shower by putting in your weaker leg first. Get out with your strong side first. Have a phone or medical alert device in the bathroom with you.   Where can you learn more?  Go to https://www.CloudSlideswise.net/patiented  Enter G117 in the search box to learn more about \"Preventing Falls: Care Instructions.\"  Current as of: " July 31, 2024  Content Version: 14.4    6849-3034 [employer].   Care instructions adapted under license by your healthcare professional. If you have questions about a medical condition or this instruction, always ask your healthcare professional. [employer] disclaims any warranty or liability for your use of this information.

## 2025-05-13 NOTE — PROGRESS NOTES
"Preventive Care Visit  [facility]  Gurinder Cullen MD, Family Medicine  May 13, 2025      Assessment & Plan     Routine general medical examination at a health care facility  Medically doing well.  Recommended regular exercise, healthy diet and weight loss    Type 2 diabetes mellitus without complication, without long-term current use of insulin (H)  Last hemoglobin A1c 6.0, consistent with well-controlled diabetes.  Metformin and lisinopril refilled  Lab Results   Component Value Date    A1C 6.0 04/28/2025    A1C 6.3 10/07/2024    A1C 11.7 05/09/2024    A1C 7.4 03/27/2023    A1C 6.7 09/23/2022    A1C 6.3 10/23/2020    A1C 6.2 01/17/2020    A1C 6.0 07/17/2019    A1C 6.6 04/17/2019    A1C 6.1 03/26/2018   - lisinopril (ZESTRIL) 2.5 MG tablet; Take 1 tablet (2.5 mg) by mouth daily.  - metFORMIN (GLUCOPHAGE XR) 500 MG 24 hr tablet; Take 2 tablets (1,000 mg) by mouth 2 times daily (with meals).    Chronic gout of right ankle, unspecified cause  Symptoms stable.  Allopurinol and indomethacin refilled  - allopurinol (ZYLOPRIM) 300 MG tablet; Take 1.5 tablets (450 mg) by mouth daily.  - indomethacin (INDOCIN) 50 MG capsule; TAKE 1 CAPSULE BY MOUTH THREE TIMES DAILY WITH MEALS    Hyperlipidemia LDL goal <100  Recommended to continue Lipitor  - atorvastatin (LIPITOR) 20 MG tablet; Take 1 tablet (20 mg) by mouth daily.    H/O: stroke  Will continue Plavix and Lipitor  - clopidogrel (PLAVIX) 75 MG tablet; Take 1 tablet (75 mg) by mouth daily.    Gastroesophageal reflux disease without esophagitis  Lansoprazole refilled  - LANsoprazole (PREVACID) 30 MG DR capsule; Take 1 capsule by mouth once daily    Screening for prostate cancer  - PSA, screen; Future      BMI  Estimated body mass index is 39.75 kg/m  as calculated from the following:    Height as of this encounter: 1.787 m (5' 10.35\").    Weight as of this encounter: 126.9 kg (279 lb 12.8 oz).   Weight management plan: Discussed healthy diet and " exercise guidelines    Counseling  Appropriate preventive services were addressed with this patient via screening, questionnaire, or discussion as appropriate for fall prevention, nutrition, physical activity, Tobacco-use cessation, social engagement, weight loss and cognition.  Checklist reviewing preventive services available has been given to the patient.  Reviewed patient's diet, addressing concerns and/or questions.   He is at risk for lack of exercise and has been provided with information to increase physical activity for the benefit of his well-being.         Lizy Alvarenga is a 59 year old, presenting for the following:  Physical        5/13/2025    10:15 AM   Additional Questions   Roomed by Crista OSORIO LPN   Accompanied by Self          HPI       Advance Care Planning    Discussed advance care planning with patient; informed AVS has link to [facility].        5/13/2025   General Health   How would you rate your overall physical health? Good   Feel stress (tense, anxious, or unable to sleep) Only a little   (!) STRESS CONCERN      5/13/2025   Nutrition   Three or more servings of calcium each day? (!) NO   Diet: Regular (no restrictions)   How many servings of fruit and vegetables per day? (!) 2-3   How many sweetened beverages each day? 0-1         5/13/2025   Exercise   Days per week of moderate/strenous exercise 1 day   Average minutes spent exercising at this level 10 min   (!) EXERCISE CONCERN      5/13/2025   Social Factors   Frequency of gathering with friends or relatives Once a week   Worry food won't last until get money to buy more No   Food not last or not have enough money for food? No   Do you have housing? (Housing is defined as stable permanent housing and does not include staying outside in a car, in a tent, in an abandoned building, in an overnight shelter, or couch-surfing.) Yes   Are you worried about losing your housing? No   Lack of transportation? No   Unable to get utilities  (heat,electricity)? No         5/13/2025   Fall Risk   Fallen 2 or more times in the past year? No   Trouble with walking or balance? Yes   Gait Speed Test (Document in seconds) 4   Gait Speed Test Interpretation Less than or equal to 5.00 seconds - PASS          5/13/2025   Dental   Dentist two times every year? Yes         Today's PHQ-2 Score:       5/13/2025    10:07 AM   PHQ-2 ( 1999 [employer])   Q1: Little interest or pleasure in doing things 1   Q2: Feeling down, depressed or hopeless 0   PHQ-2 Score 1    Q1: Little interest or pleasure in doing things Several days   Q2: Feeling down, depressed or hopeless Not at all   PHQ-2 Score 1       Patient-reported           5/13/2025   Substance Use   Alcohol more than 3/day or more than 7/wk No   Do you use any other substances recreationally? No     Social History     Tobacco Use    Smoking status: Former     Current packs/day: 0.00     Average packs/day: 1 pack/day for 25.0 years (25.0 ttl pk-yrs)     Types: Cigarettes     Start date: 3/28/1985     Quit date: 3/28/2010     Years since quitting: 15.1     Passive exposure: Past    Smokeless tobacco: Never   Vaping Use    Vaping status: Never Used   Substance Use Topics    Alcohol use: No     Alcohol/week: 5.0 standard drinks of alcohol     Types: 6 Standard drinks or equivalent per week    Drug use: No           5/13/2025   STI Screening   New sexual partner(s) since last STI/HIV test? No   Last PSA:   PSA   Date Value Ref Range Status   03/26/2018 0.14 0 - 4 ug/L Final     Comment:     Assay Method:  Chemiluminescence using Siemens Vista analyzer     Prostate Specific Antigen Screen   Date Value Ref Range Status   05/09/2024 0.14 0.00 - 3.50 ng/mL Final     ASCVD Risk   The ASCVD Risk score (Negrita LOPEZ, et al., 2019) failed to calculate for the following reasons:    Risk score cannot be calculated because patient has a medical history suggesting prior/existing ASCVD           Reviewed and updated as needed this  visit by Provider                    Past Medical History:   Diagnosis Date    Hypertension     DIAGNOSISED MAY 2012    Unspecified cerebral artery occlusion with cerebral infarction      Past Surgical History:   Procedure Laterality Date    COLONOSCOPY N/A 5/14/2018    Procedure: COLONOSCOPY;  colonoscopy;  Surgeon: Glynn Guardado MD;  Location: [facility]    ENT SURGERY      NOSE BLLED IN PAST NO SURGURY     OB History   No obstetric history on file.     Lab work is in process  Labs reviewed in EPIC  BP Readings from Last 3 Encounters:   05/13/25 136/60   05/20/24 138/68   05/09/24 (!) 160/78    Wt Readings from Last 3 Encounters:   05/13/25 126.9 kg (279 lb 12.8 oz)   05/20/24 127 kg (280 lb)   05/09/24 127.9 kg (282 lb)                  Patient Active Problem List   Diagnosis    Advanced care planning/counseling discussion    Cerebral embolism with cerebral infarction (H)    Hyperlipidemia LDL goal <100    Essential hypertension with goal blood pressure less than 140/90    CVA (cerebral vascular accident) (H)    MADDY (obstructive sleep apnea)    Generalized anxiety disorder    Morbid obesity (H)    Type 2 diabetes mellitus without complication, without long-term current use of insulin (H)    Chronic gout of right ankle, unspecified cause    Gastroesophageal reflux disease without esophagitis     Past Surgical History:   Procedure Laterality Date    COLONOSCOPY N/A 5/14/2018    Procedure: COLONOSCOPY;  colonoscopy;  Surgeon: Glynn Guardado MD;  Location: [facility]    ENT SURGERY      NOSE BLLED IN PAST NO SURGURY       Social History     Tobacco Use    Smoking status: Former     Current packs/day: 0.00     Average packs/day: 1 pack/day for 25.0 years (25.0 ttl pk-yrs)     Types: Cigarettes     Start date: 3/28/1985     Quit date: 3/28/2010     Years since quitting: 15.1     Passive exposure: Past    Smokeless tobacco: Never   Substance Use Topics    Alcohol use: No     Alcohol/week: 5.0 standard drinks of alcohol      Types: 6 Standard drinks or equivalent per week     Family History   Problem Relation Age of Onset    Heart Disease Mother         MI late 50's age    Diabetes Father     Blood Disease Father         clots    Breast Cancer Maternal Grandmother     Heart Disease Maternal Grandfather     Alcohol/Drug Paternal Grandfather     Blood Disease Brother         clots-antiptrip    Diabetes Brother 52        Type II         Current Outpatient Medications   Medication Sig Dispense Refill    allopurinol (ZYLOPRIM) 300 MG tablet Take 1.5 tablets (450 mg) by mouth daily. 135 tablet 3    Ascorbic Acid (VITAMIN C PO) Take 1,000 mg by mouth       ASPIRIN NOT PRESCRIBED, INTENTIONAL, Antiplatelet medication not prescribed intentionally due to Plavix  0    atorvastatin (LIPITOR) 20 MG tablet Take 1 tablet (20 mg) by mouth daily. 90 tablet 3    blood glucose (ACCU-CHEK GUIDE) test strip Use to test blood sugar 3 times daily or as directed. 100 each 11    blood glucose monitoring (ACCU-CHEK FASTCLIX) lancets Use to test blood sugar 3 times daily or as directed.  Ok to substitute alternative if insurance prefers. 102 each 11    Cholecalciferol (VITAMIN D) 2000 UNITS tablet Take 2,000 Units by mouth daily. 100 tablet 3    clopidogrel (PLAVIX) 75 MG tablet Take 1 tablet (75 mg) by mouth daily. 90 tablet 3    cyanocobalamin (B-12) 1000 MCG TBCR Take 1,000 mcg by mouth daily. 100 tablet 1    Hearing Aid Batteries (HEARING AID BATTERY) MISC       IBUPROFEN PO Take 400-600 mg by mouth as needed.        indomethacin (INDOCIN) 50 MG capsule TAKE 1 CAPSULE BY MOUTH THREE TIMES DAILY WITH MEALS 30 capsule 3    LANsoprazole (PREVACID) 30 MG DR capsule Take 1 capsule by mouth once daily 90 capsule 3    lisinopril (ZESTRIL) 2.5 MG tablet Take 1 tablet (2.5 mg) by mouth daily. 90 tablet 3    metFORMIN (GLUCOPHAGE XR) 500 MG 24 hr tablet Take 2 tablets (1,000 mg) by mouth 2 times daily (with meals). 360 tablet 3    mupirocin (BACTROBAN) 2 % external  ointment Apply topically 3 times daily 10 days for skin sores. 22 g 0    order for DME  HEATED HUMIDITY   CHIN STRAP   NON-DSPOSABLE FILTERS   DISPOSABLE FILTERS (2 PER 1 MONTHS)   NASAL PILLOW (2 PER 1 MONTHS)   FULL FACE MASK  (1 PER 3 MONTHS)   FULL FACE CUSHION  (1 PER 1 MONTHS)   HUMIDIFIER CHAMBER (1 PER 6 MONTHS)   MASK (1 PER 3 MONTHS)   NASAL CUSHION (2 PER 1 MONTHS)   HEADGEAR (1 PER 6 MONTHS)   TUBING (1 PER 3 MONTHS)   HEATED TUBING (1 PER 3 MONTHS) 1 Device 1    ORDER FOR DME Pt was set up on a [employer] RemStar 60 Series Auto AFlex 5-15 cm H2O with heated humidity and a modem. Pt chose a Mirage FX nasal mask standard size.      Continuous Glucose Sensor (FREESTYLE CLEO 14 DAY SENSOR) MISC Change every 14 days. (Patient not taking: Reported on 5/13/2025) 2 each 5     Allergies   Allergen Reactions    Nka [No Known Allergies]      Recent Labs   Lab Test 04/28/25  0926 10/07/24  0954 05/09/24  1005 03/27/23  0855 09/23/22  1036 11/04/21  0940 11/04/21  0940 10/23/20  0934 01/17/20  0832 07/17/19  0756 04/17/19  0955   A1C 6.0* 6.3* 11.7* 7.4* 6.7*   < > 6.7* 6.3*   < > 6.0* 6.6*   LDL 36  --  47 77  --    < > 60 54  --  42  --    HDL 55  --  32* 52  --    < > 64 62  --  57  --    TRIG 57  --  137 110  --    < > 168* 195*  --  185*  --    ALT  --   --   --   --  42  --  45  --   --   --  36   CR 0.89  --  0.82 0.80 0.83   < > 0.76 0.84  --   --  0.82   GFRESTIMATED >90  --  >90 >90 >90   < > >90 >90  --   --  >90   GFRESTBLACK  --   --   --   --   --   --   --  >90  --   --  >90   POTASSIUM 4.8  --  4.6 4.8 4.7   < > 4.7 4.0  --   --  4.4   TSH  --   --   --   --   --   --   --   --   --  1.92  --     < > = values in this interval not displayed.           Review of Systems  Constitutional, neuro, ENT, endocrine, pulmonary, cardiac, gastrointestinal, genitourinary, musculoskeletal, integument and psychiatric systems are negative, except as  "otherwise noted.     Objective    Exam  /60   Pulse 78   Temp 97.8  F (36.6  C) (Tympanic)   Resp 20   Ht 1.787 m (5' 10.35\")   Wt 126.9 kg (279 lb 12.8 oz)   SpO2 98%   BMI 39.75 kg/m     Estimated body mass index is 39.75 kg/m  as calculated from the following:    Height as of this encounter: 1.787 m (5' 10.35\").    Weight as of this encounter: 126.9 kg (279 lb 12.8 oz).  Wt Readings from Last 10 Encounters:   05/13/25 126.9 kg (279 lb 12.8 oz)   05/20/24 127 kg (280 lb)   05/09/24 127.9 kg (282 lb)   10/30/23 127 kg (280 lb)   05/04/23 135.6 kg (299 lb)   03/27/23 133.8 kg (295 lb)   09/23/22 131.5 kg (290 lb)   11/04/21 132.5 kg (292 lb)   10/23/20 132 kg (291 lb)   01/17/20 128.5 kg (283 lb 6.4 oz)      Physical Exam  GENERAL: alert and no distress  EYES: Eyes grossly normal to inspection, PERRL and conjunctivae and sclerae normal  HENT: normal cephalic/atraumatic, nose and mouth without ulcers or lesions, oropharynx clear, and oral mucous membranes moist  NECK: no adenopathy, no asymmetry, masses, or scars  RESP: lungs clear to auscultation - no rales, rhonchi or wheezes  CV: regular rates and rhythm, normal S1 S2, no S3 or S4, and no murmur, click or rub  ABDOMEN: soft, nontender  MS: no gross musculoskeletal defects noted, no edema  SKIN: no suspicious lesions or rashes  NEURO: Normal strength and tone, mentation intact and speech normal  PSYCH: mentation appears normal, affect normal/bright        Signed Electronically by: Gurinder Cullen MD    "

## 2025-06-08 NOTE — LETTER
[facility]  [address] [place] [address]  Phone: [phone]  Fax: [phone]    January 15, 2019        Lyle Tenorio  [address] [facility] [address]          To whom it may concern:    RE: Lyle Tenorio    Patient was seen and treated today at our clinic. He has acute joint swelling and needs light duty until resolved.    Please contact me for questions or concerns.      Sincerely,        Arnold Washington MD   Opt out